# Patient Record
Sex: FEMALE | Race: BLACK OR AFRICAN AMERICAN | Employment: OTHER | URBAN - METROPOLITAN AREA
[De-identification: names, ages, dates, MRNs, and addresses within clinical notes are randomized per-mention and may not be internally consistent; named-entity substitution may affect disease eponyms.]

---

## 2017-01-04 ENCOUNTER — TELEPHONE (OUTPATIENT)
Dept: INTERNAL MEDICINE CLINIC | Age: 82
End: 2017-01-04

## 2017-01-04 RX ORDER — CHLORTHALIDONE 25 MG/1
TABLET ORAL
Qty: 90 TAB | Refills: 3 | Status: SHIPPED | OUTPATIENT
Start: 2017-01-04 | End: 2017-12-05 | Stop reason: SDUPTHER

## 2017-01-04 NOTE — TELEPHONE ENCOUNTER
Pt called and states that for the future all her medications are to go to the [facility] at [address] [place] [address], [place], [facility], phone #[phone]. Please call pt if needed.

## 2017-01-12 ENCOUNTER — DOCUMENTATION ONLY (OUTPATIENT)
Dept: INTERNAL MEDICINE CLINIC | Age: 82
End: 2017-01-12

## 2017-01-12 NOTE — PROGRESS NOTES
Medicare Part B Preventive Services Limitations Recommendation Scheduled   Bone Mass Measurement  (age 72 & older, biennial) Requires diagnosis related to osteoporosis or estrogen deficiency. Biennial benefit unless patient has history of long-term glucocorticoid tx or baseline is needed because initial test was by other method Completed Recommended every 2 years the patient declines   Cardiovascular Screening Blood Tests (every 5 years)  Total cholesterol, HDL, Triglycerides Order as a panel if possible Completed 7/16  Recommended annually Due  7/17   Colorectal Cancer Screening  -Fecal occult blood test (annual)  -Flexible sigmoidoscopy (5y)  -Screening colonoscopy (10y)  -Barium Enema   Completed   Recommended every years  declines   Counseling to Prevent Tobacco Use (up to 8 sessions per year)  - Counseling greater than 3 and up to 10 minutes  - Counseling greater than 10 minutes Patients must be asymptomatic of tobacco-related conditions to receive as preventive service  n/a n/a   Diabetes Screening Tests (at least every 3 years, Medicare covers annually or at 6-month intervals for prediabetic patients)     Fasting blood sugar (FBS) or glucose tolerance test (GTT) Patient must be diagnosed with one of the following:  -Hypertension, Dyslipidemia, obesity, previous impaired FBS or GTT  Or any two of the following: overweight, FH of diabetes, age ? 72, history of gestational diabetes, birth of baby weighing more than 9 pounds Completed: A1c in 7/16 - 6.1  Recommended every 3-6 months for pre-diabetes/diabetes  Due now   Diabetes Self-Management Training (DSMT) (no USPSTF recommendation) Requires referral by treating physician for patient with diabetes or renal disease. 10 hours of initial DSMT session of no less than 30 minutes each in a continuous 12-month period. 2 hours of follow-up DSMT in subsequent years.      Glaucoma Screening (no USPSTF recommendation) Diabetes mellitus, family history, , age 48 or over,  American, age 72 or over   [employer]  Recommended annually Due    Human Immunodeficiency Virus (HIV) Screening (annually for increased risk patients)  HIV-1 and HIV-2 by EIA, LEWIS, rapid antibody test, or oral mucosa transudate Patient must be at increased risk for HIV infection per USPSTF guidelines or pregnant. Tests covered annually for patients at increased risk. Pregnant patients may receive up to 3 test during pregnancy. n/a n/a   Medical Nutrition Therapy (MNT) (for diabetes or renal disease not recommended schedule) Requires referral by treating physician for patient with diabetes or renal disease. Can be provided in same year as diabetes self-management training (DSMT), and CMS recommends medical nutrition therapy take place after DSMT. Up to 3 hours for initial year and 2 hours in subsequent years. n/a n/a   Prostate Cancer Screening (annually up to age 76)  - Digital rectal exam (SEAN)  - Prostate specific antigen (PSA) Annually (age 48 or over), SEAN not paid separately when covered E/M service is provided on same date n/a n/a   Seasonal Influenza Vaccination (annually)      Recommended Annually declines   Pneumococcal Vaccination (once after 72)    Pneumovax 2014  Recommended once over the age of 72  Prevnar 15 - 2016 Complete   Hepatitis B Vaccinations (if medium/high risk) Medium/high risk factors: End-stage renal disease,  Hemophiliacs who received Factor VIII or IX concentrates, Clients of institutions for the mentally retarded, Persons who live in the same house as a HepB virus carrier, Homosexual men, Illicit injectable drug abusers.       Screening Mammography (biennial age 54-69)?  Annually (age 36 or over)   declines   Screening Pap Tests and Pelvic Examination (up to age 79 and after 79 if unknown history or abnormal study last 10 years) Every 25 months except high risk   declines   Ultrasound Screening for Abdominal Aortic Aneurysm (AAA) (once) Patient must be referred through IPPE and not have had a screening for abdominal aortic aneurysm before under Medicare.  Limited to patients who meet one of the following criteria:  - Men who are 73-68 years old and have smoked more than 100 cigarettes in their lifetime.  -Anyone with a FH of AAA  -Anyone recommended for screening by USPSTF n/a n/a

## 2017-01-13 ENCOUNTER — HOSPITAL ENCOUNTER (OUTPATIENT)
Dept: LAB | Age: 82
Discharge: HOME | End: 2017-01-13
Payer: MEDICARE

## 2017-01-13 ENCOUNTER — OFFICE VISIT (OUTPATIENT)
Dept: INTERNAL MEDICINE CLINIC | Age: 82
End: 2017-01-13

## 2017-01-13 VITALS
TEMPERATURE: 98.1 F | BODY MASS INDEX: 27.48 KG/M2 | HEART RATE: 65 BPM | SYSTOLIC BLOOD PRESSURE: 135 MMHG | WEIGHT: 171 LBS | OXYGEN SATURATION: 100 % | HEIGHT: 66 IN | DIASTOLIC BLOOD PRESSURE: 77 MMHG

## 2017-01-13 DIAGNOSIS — Z00.00 ROUTINE GENERAL MEDICAL EXAMINATION AT A HEALTH CARE FACILITY: ICD-10-CM

## 2017-01-13 DIAGNOSIS — E11.9 DIABETES MELLITUS WITHOUT COMPLICATION (HCC): ICD-10-CM

## 2017-01-13 DIAGNOSIS — I25.10 ATHEROSCLEROSIS OF NATIVE CORONARY ARTERY OF NATIVE HEART WITHOUT ANGINA PECTORIS: Primary | ICD-10-CM

## 2017-01-13 DIAGNOSIS — E78.2 MIXED HYPERLIPIDEMIA: ICD-10-CM

## 2017-01-13 DIAGNOSIS — I10 ESSENTIAL HYPERTENSION, BENIGN: ICD-10-CM

## 2017-01-13 DIAGNOSIS — Z13.39 SCREENING FOR ALCOHOLISM: ICD-10-CM

## 2017-01-13 PROCEDURE — 36415 COLL VENOUS BLD VENIPUNCTURE: CPT

## 2017-01-13 PROCEDURE — 83036 HEMOGLOBIN GLYCOSYLATED A1C: CPT

## 2017-01-13 PROCEDURE — 80061 LIPID PANEL: CPT

## 2017-01-13 PROCEDURE — 80053 COMPREHEN METABOLIC PANEL: CPT

## 2017-01-13 NOTE — PROGRESS NOTES
HISTORY OF PRESENT ILLNESS  Albino Astudillo is a 80 y.o. female. HPI   Last here 7/12/16. Pt is here to f/u on chronic conditions    BP today is 135/77  BP at home running around 121/67, 119/67  Continues clorthalidone and lotrel daily, and metoprolol 50mg BID    Reviewed last labs 7/16  a1c stable, lipids at goal      Wt is stable since last visit   Pt is walking daily for exercise and active in her yard    Continues lipitor 20mg daily-- at goal in July     Reviewed last note from Dr. Freddy Landry (cardio) 11/10/16  /60 at [facility] office  Doing well with no cardiac symptoms. Lipids at goal 7/16. Continue current care including plavix for h/o Cypher stents and f/u in 6 months. Exercising regularly.   Continues plavix per this physician, she also takes ASA 81mg daily  She has f/u pending with cardio    Pt previously followed with Dr. Monty Gonsalez (derm) for eczema  She has not had any flares of this recently, doing well     ACP: Began discussion today, SDM is her , Aguila Britton  Provided paperwork today    PREVENTIVE:    Colonoscopy: declines further  Pap: 9/2010, declines further  Mammogram: declines further  Dexa: declines further  Tdap: declines    Pneumovax: 11/18/2014  [id number]: 4/28/2016  Zostavax: declines  Flu shot: declines    Foot exam: 1/16  Microalbumin: 1/16  A1c: 1/11 6.4 , 8/11 6.1, 12/11 5.8, 4/12 5.8, 10/12 5.6, 4/13 6.3, 9/13 5.9, 1/14 5.6, 7/14 5.3, 11/14 5.5, 9/15 5.9, 1/16 6.1, 7/16 6.1  Eye exam: Dr. Bettylou Severe, 4/16  Lipids: 7/16, LDL 64      Patient Active Problem List    Diagnosis Date Noted    Diabetes mellitus without complication ([facility] [address] 06/61/9206    Left carotid bruit 01/16/2015    Closed bimalleolar fracture of right ankle 10/18/2013    Gout 06/11/2013    Mitral valve disorders 05/08/2012    Mixed hyperlipidemia 03/07/2012    Essential hypertension, benign 03/07/2012    Postsurgical percutaneous transluminal coronary angioplasty status 03/07/2012    S/P Stent LAD (LISSA) 03/07/2012    Coronary atherosclerosis of native coronary artery 03/07/2012    Borderline diabetes 03/07/2012    Hypertension 09/16/2009    Hyperlipidemia 09/16/2009    Diabetes ([facility] [address] 09/16/2009    CAD (coronary artery disease) 09/16/2009     Current Outpatient Prescriptions   Medication Sig Dispense Refill    chlorthalidone (HYGROTEN) 25 mg tablet TAKE 1 TABLET BY MOUTH DAILY. 90 Tab 3    PREVNAR 13, PF, 0.5 mL syrg injection TO BE ADMINISTERED BY THE PHARMACY  0    atorvastatin (LIPITOR) 20 mg tablet TAKE 1 TABLET BY MOUTH AT BEDTIME 30 Tab 6    amLODIPine-benazepril (LOTREL) 5-20 mg per capsule TAKE ONE CAPSULE BY MOUTH EVERY DAY 90 Cap 3    clopidogrel (PLAVIX) 75 mg tablet TAKE 1 TAB BY MOUTH DAILY. 90 Tab 4    metoprolol (LOPRESSOR) 50 mg tablet TAKE 1 TABLET BY MOUTH TWICE A  Tab 3    hydrocortisone (HYTONE) 2.5 % ointment Apply  to affected area two (2) times a day. use thin layer Monday to Friday only . 30 g 0    clobetasol (TEMOVATE) 0.05 % ointment Apply  to affected area two (2) times a day. 15 g 0    Aspirin, Buffered 81 mg tab Take  by mouth.  diphenhydrAMINE (BENADRYL ALLERGY) 25 mg tablet Take 25 mg by mouth every six (6) hours as needed for Sleep.        Past Surgical History   Procedure Laterality Date    Pr cardiac surg procedure unlist  2008     cardiac stent    Hx orthopaedic  10/2013     right ankle    Hx orthopaedic  2/20/14     INCISION AND DRAINAGE RIGHT ANKLE AND HARDWARE REMOVAL      Lab Results  Component Value Date/Time   WBC 8.8 07/12/2016 10:02 AM   HGB 12.3 07/12/2016 10:02 AM   HCT 37.5 07/12/2016 10:02 AM   PLATELET 515 59/55/3115 10:02 AM   MCV 92 07/12/2016 10:02 AM       Lab Results  Component Value Date/Time   Cholesterol, total 147 07/12/2016 10:02 AM   HDL Cholesterol 63 07/12/2016 10:02 AM   LDL, calculated 64 07/12/2016 10:02 AM   Triglyceride 102 07/12/2016 10:02 AM   CHOL/HDL Ratio 2.6 09/15/2010 07:53 AM       Lab Results  Component Value Date/Time   GFR est AA 98 07/12/2016 10:02 AM   GFR est non-AA 85 07/12/2016 10:02 AM   Creatinine 0.64 07/12/2016 10:02 AM   BUN 12 07/12/2016 10:02 AM   Sodium 142 07/12/2016 10:02 AM   Potassium 3.9 07/12/2016 10:02 AM   Chloride 96 07/12/2016 10:02 AM   CO2 28 07/12/2016 10:02 AM         Review of Systems   Respiratory: Negative for shortness of breath. Cardiovascular: Negative for chest pain. Physical Exam   Constitutional: She is oriented to person, place, and time. She appears well-developed and well-nourished. No distress. HENT:   Head: Normocephalic and atraumatic. Eyes: Conjunctivae and EOM are normal. Right eye exhibits no discharge. Left eye exhibits no discharge. Neck: Normal range of motion. Neck supple. Cardiovascular: Normal rate, regular rhythm, normal heart sounds and intact distal pulses. Exam reveals no gallop and no friction rub. No murmur heard. Pulmonary/Chest: Effort normal and breath sounds normal. No respiratory distress. She has no wheezes. She has no rales. She exhibits no tenderness. Musculoskeletal: Normal range of motion. She exhibits no edema, tenderness or deformity. Lymphadenopathy:     She has no cervical adenopathy. Neurological: She is alert and oriented to person, place, and time. Coordination normal.   Skin: Skin is warm and dry. No rash noted. She is not diaphoretic. No erythema. No pallor. Psychiatric: She has a normal mood and affect. Her behavior is normal.       ASSESSMENT and PLAN    ICD-10-CM ICD-9-CM    1. Atherosclerosis of native coronary artery of native heart without angina pectoris    Follows with Dr Roseanne Herzog, saw him in 11/16, continues on plavix, follow up in 6 months. Q22.61 715.06 METABOLIC PANEL, COMPREHENSIVE      HEMOGLOBIN A1C WITH EAG      CANCELED:  DIABETES FOOT EXAM   2.  Routine general medical examination at a health care facility G92.24 W74.4 METABOLIC PANEL, COMPREHENSIVE      HEMOGLOBIN A1C WITH EAG      CANCELED:  DIABETES FOOT EXAM   3. Screening for alcoholism    Screen    H58.79 X81.3 METABOLIC PANEL, COMPREHENSIVE      HEMOGLOBIN A1C WITH EAG      CANCELED:  DIABETES FOOT EXAM   4. Mixed hyperlipidemia    Continues on lipitor 20mg daily Z97.3 166.9 METABOLIC PANEL, COMPREHENSIVE      HEMOGLOBIN A1C WITH EAG      LIPID PANEL      CANCELED:  DIABETES FOOT EXAM   5. Essential hypertension, benign    Controlled on metoprolol 50mg BID, lotrel and chlorthalidone L31 872.1 METABOLIC PANEL, COMPREHENSIVE      HEMOGLOBIN A1C WITH EAG      CANCELED:  DIABETES FOOT EXAM   6. Diabetes mellitus without complication (HCC)    Pt really with more prediabetes than diabetes, has been diet controlled, wt stable, repeat a1c today. N71.1 239.30 METABOLIC PANEL, COMPREHENSIVE      HEMOGLOBIN A1C WITH EAG      CANCELED:  DIABETES FOOT EXAM        Written by Negro Hong, as dictated by Airam Quiros MD.    Current diagnosis and concerns discussed with pt at length. Understands risks and benefits or current treatment plan and medications and accepts the treatment and medication with any possible risks.   Pt asks appropriate questions which were answered.   Pt instructed to call with any concerns or problems.

## 2017-01-13 NOTE — PROGRESS NOTES
This is a Subsequent Medicare Annual Wellness Visit providing Personalized Prevention Plan Services (PPPS) (Performed 12 months after initial AWV and PPPS )    I have reviewed the patient's medical history in detail and updated the computerized patient record. History     Past Medical History   Diagnosis Date    CAD (coronary artery disease) 9/16/2009     EF=65-70%; Mild-mod MR; Dr Emperatriz Medley    Diabetes [facility]) 9/16/2009     boarder line controlle by diet and exercise.  Hyperlipidemia 9/16/2009    Hypertension 9/16/2009      Past Surgical History   Procedure Laterality Date    Pr cardiac surg procedure unlist  2008     cardiac stent    Hx orthopaedic  10/2013     right ankle    Hx orthopaedic  2/20/14     INCISION AND DRAINAGE RIGHT ANKLE AND HARDWARE REMOVAL     Current Outpatient Prescriptions   Medication Sig Dispense Refill    chlorthalidone (HYGROTEN) 25 mg tablet TAKE 1 TABLET BY MOUTH DAILY. 90 Tab 3    PREVNAR 13, PF, 0.5 mL syrg injection TO BE ADMINISTERED BY THE PHARMACY  0    atorvastatin (LIPITOR) 20 mg tablet TAKE 1 TABLET BY MOUTH AT BEDTIME 30 Tab 6    amLODIPine-benazepril (LOTREL) 5-20 mg per capsule TAKE ONE CAPSULE BY MOUTH EVERY DAY 90 Cap 3    clopidogrel (PLAVIX) 75 mg tablet TAKE 1 TAB BY MOUTH DAILY. 90 Tab 4    metoprolol (LOPRESSOR) 50 mg tablet TAKE 1 TABLET BY MOUTH TWICE A  Tab 3    Aspirin, Buffered 81 mg tab Take  by mouth.  diphenhydrAMINE (BENADRYL ALLERGY) 25 mg tablet Take 25 mg by mouth every six (6) hours as needed for Sleep.  hydrocortisone (HYTONE) 2.5 % ointment Apply  to affected area two (2) times a day. use thin layer Monday to Friday only . 30 g 0    clobetasol (TEMOVATE) 0.05 % ointment Apply  to affected area two (2) times a day.  15 g 0     Allergies   Allergen Reactions    Crestor [Rosuvastatin] Unknown (comments)     Family History   Problem Relation Age of Onset    Hypertension Mother     Heart Disease Mother     Hypertension Father     Heart Disease Father      Social History   Substance Use Topics    Smoking status: Never Smoker    Smokeless tobacco: Never Used    Alcohol use No     Patient Active Problem List   Diagnosis Code    Hypertension I10    Hyperlipidemia E78.5    Diabetes ([address] E11.9    CAD (coronary artery disease) I25.10    Mixed hyperlipidemia E78.2    Essential hypertension, benign I10    Postsurgical percutaneous transluminal coronary angioplasty status Z98.61    S/P Stent LAD (LISSA) Z95.9    Coronary atherosclerosis of native coronary artery I25.10    Borderline diabetes R73.03    Mitral valve disorders I05.9    Gout M10.9    Closed bimalleolar fracture of right ankle S82.841A    Left carotid bruit R09.89    Diabetes mellitus without complication (HCC) B34.0       Depression Risk Factor Screening:     PHQ 2 / 9, over the last two weeks 1/13/2017   Little interest or pleasure in doing things Not at all   Feeling down, depressed or hopeless Not at all   Total Score PHQ 2 0     Alcohol Risk Factor Screening: On any occasion during the past 3 months, have you had more than 3 drinks containing alcohol? No    Do you average more than 7 drinks per week? No  No alcohol at all     Functional Ability and Level of Safety:     Hearing Loss   normal-to-mild    Activities of Daily Living   Self-care. Requires assistance with: no ADLs    Fall Risk     Fall Risk Assessment, last 12 mths 1/13/2017   Able to walk? Yes   Fall in past 12 months? No     Abuse Screen   Patient is not abused    Review of Systems   Not required    Physical Examination     Evaluation of Cognitive Function:  Mood/affect:  neutral  Appearance: age appropriate  Family member/caregiver input: none    No exam performed today, awv.     Patient Care Team:  Scot Raza MD as PCP - General (Internal Medicine)  Shasta Lubin  (Ophthalmology)  Dantete Horton (Inactive) (Dermatology)  Keke Villanueva MD (Cardiology)  Araceli Castillo MD (Infectious Diseases)  Latricia Vegas, RN as Nurse Navigator  Dr blandon eye  updated  Advice/Referrals/Counseling   Education and counseling provided:  Are appropriate based on today's review and evaluation  End-of-Life planning (with patient's consent)  Pneumococcal Vaccine  Influenza Vaccine  Screening Mammography  Screening Pap and pelvic (covered once every 2 years)  Bone mass measurement (DEXA)  Screening for glaucoma  Diabetes screening test    Assessment/Plan       ICD-10-CM ICD-9-CM    1. Routine general medical examination at a health care facility Z00.00 V70.0    2. Screening for alcoholism Z13.89 V79.1    . Discussed with patient about advance medical directive. Provided patient blank AMD and Your Right to Decide Booklet. Requested that if completed to provide a copy of AMD to PCP office. Colonoscopy: declines      Ophtho: 4/16 Dr. Kenan Roy get notes to confirm        Pap: sept 2010 per patient--declines     Mammogram: declines    Dexa: declines      prevnar 13: [facility], 4/28/16    Rest Vaccines:   Flu shot: declines    Tdap:declines    Pneumovax: declines      Zostavax: declines    Medication reconciliation completed by MA and reviewed by me. Medical/surgical/social/family history reviewed and updated by me. Patient provided AVS and preventative screening table. Patient verbalized understanding of all information discussed.

## 2017-01-13 NOTE — PATIENT INSTRUCTIONS
Medicare Part B Preventive Services Limitations Recommendation Scheduled   Bone Mass Measurement  (age 72 & older, biennial) Requires diagnosis related to osteoporosis or estrogen deficiency. Biennial benefit unless patient has history of long-term glucocorticoid tx or baseline is needed because initial test was by other method Completed Recommended every 2 years the patient declines   Cardiovascular Screening Blood Tests (every 5 years)  Total cholesterol, HDL, Triglycerides Order as a panel if possible Completed 7/16  Recommended annually Due 7/17   Colorectal Cancer Screening  -Fecal occult blood test (annual)  -Flexible sigmoidoscopy (5y)  -Screening colonoscopy (10y)  -Barium Enema    Completed   Recommended every years  declines   Counseling to Prevent Tobacco Use (up to 8 sessions per year)  - Counseling greater than 3 and up to 10 minutes  - Counseling greater than 10 minutes Patients must be asymptomatic of tobacco-related conditions to receive as preventive service  n/a n/a   Diabetes Screening Tests (at least every 3 years, Medicare covers annually or at 6-month intervals for prediabetic patients)      Fasting blood sugar (FBS) or glucose tolerance test (GTT) Patient must be diagnosed with one of the following:  -Hypertension, Dyslipidemia, obesity, previous impaired FBS or GTT  Or any two of the following: overweight, FH of diabetes, age ? 72, history of gestational diabetes, birth of baby weighing more than 9 pounds Completed: A1c in 7/16 - 6.1  Recommended every 3-6 months for pre-diabetes/diabetes Due now   Diabetes Self-Management Training (DSMT) (no USPSTF recommendation) Requires referral by treating physician for patient with diabetes or renal disease. 10 hours of initial DSMT session of no less than 30 minutes each in a continuous 12-month period.  2 hours of follow-up DSMT in subsequent years.  n/a  n/a   Glaucoma Screening (no USPSTF recommendation) Diabetes mellitus, family history,  American, age 48 or over,  American, age 72 or over  Dr Freedom Cavazos 4/16  Recommended annually Due 4/2017   Human Immunodeficiency Virus (HIV) Screening (annually for increased risk patients)  HIV-1 and HIV-2 by EIA, LEWIS, rapid antibody test, or oral mucosa transudate Patient must be at increased risk for HIV infection per USPSTF guidelines or pregnant. Tests covered annually for patients at increased risk. Pregnant patients may receive up to 3 test during pregnancy. n/a n/a   Medical Nutrition Therapy (MNT) (for diabetes or renal disease not recommended schedule) Requires referral by treating physician for patient with diabetes or renal disease. Can be provided in same year as diabetes self-management training (DSMT), and CMS recommends medical nutrition therapy take place after DSMT. Up to 3 hours for initial year and 2 hours in subsequent years. n/a n/a   Prostate Cancer Screening (annually up to age 76)  - Digital rectal exam (SEAN)  - Prostate specific antigen (PSA) Annually (age 48 or over), SEAN not paid separately when covered E/M service is provided on same date n/a n/a   Seasonal Influenza Vaccination (annually)        Recommended Annually declines   Pneumococcal Vaccination (once after 72)     Pneumovax 2014  Recommended once over the age of 72  Prevnar 15 - 2016 Complete    Declines additional   Hepatitis B Vaccinations (if medium/high risk) Medium/high risk factors: End-stage renal disease,  Hemophiliacs who received Factor VIII or IX concentrates, Clients of institutions for the mentally retarded, Persons who live in the same house as a HepB virus carrier, Homosexual men, Illicit injectable drug abusers.         Screening Mammography (biennial age 54-69)?  Annually (age 36 or over)    declines   Screening Pap Tests and Pelvic Examination (up to age 79 and after 79 if unknown history or abnormal study last 10 years) Every 24 months except high risk    declines   Ultrasound Screening for Abdominal Aortic Aneurysm (AAA) (once) Patient must be referred through IPPE and not have had a screening for abdominal aortic aneurysm before under Medicare.  Limited to patients who meet one of the following criteria:  - Men who are 73-68 years old and have smoked more than 100 cigarettes in their lifetime.  -Anyone with a FH of AAA  -Anyone recommended for screening by USPSTF n/a n/a

## 2017-01-13 NOTE — MR AVS SNAPSHOT
Visit Information Date & Time Provider Department Dept. Phone Encounter #  
 1/13/2017  8:30 AM Montez Leyva, [address] [place] [address],[address] [phone] [id number] Follow-up Instructions Return in about 6 months (around 7/13/2017). Your Appointments 5/11/2017  9:15 AM  
6 MONTH with Bereket Cristobal MD  
[facility]) Appt Note: .$0CP  
 [address]  
[phone] [address] Upcoming Health Maintenance Date Due DTaP/Tdap/Td series (1 - Tdap) 4/18/1958 EYE EXAM RETINAL OR DILATED Q1 9/10/2014 MEDICARE YEARLY EXAM 9/15/2016 HEMOGLOBIN A1C Q6M 1/12/2017 FOOT EXAM Q1 1/26/2017 MICROALBUMIN Q1 1/26/2017 LIPID PANEL Q1 7/12/2017 GLAUCOMA SCREENING Q2Y 4/11/2018 Allergies as of 1/13/2017  Review Complete On: 1/13/2017 By: Luda Saunders Severity Noted Reaction Type Reactions Crestor [Rosuvastatin]  03/07/2012    Unknown (comments) Current Immunizations  Reviewed on 2/20/2014 Name Date Influenza Vaccine  Deferred (Patient Refused) Pneumococcal Polysaccharide (PPSV-23) 11/18/2014,  Deferred (Patient Refused) Not reviewed this visit You Were Diagnosed With   
  
 Codes Comments Atherosclerosis of native coronary artery of native heart without angina pectoris    -  Primary ICD-10-CM: I25.10 ICD-9-CM: 414.01 Routine general medical examination at a health care facility     ICD-10-CM: Z00.00 ICD-9-CM: V70.0 Screening for alcoholism     ICD-10-CM: Z13.89 ICD-9-CM: V79.1 Mixed hyperlipidemia     ICD-10-CM: E78.2 ICD-9-CM: 272.2 Essential hypertension, benign     ICD-10-CM: I10 
ICD-9-CM: 401.1 Diabetes mellitus without complication ([facility]     LMZ-86-RV: E11.9 ICD-9-CM: 250.00 Vitals BP Pulse Temp Height(growth percentile) Weight(growth percentile) SpO2 135/77 (BP 1 Location: Right arm, BP Patient Position: Sitting) 65 98.1 °F (36.7 °C) (Oral) 5' 6\" (1.676 m) 171 lb (77.6 kg) 100% BMI OB Status Smoking Status 27.6 kg/m2 Postmenopausal Never Smoker BMI and BSA Data Body Mass Index Body Surface Area  
 27.6 kg/m 2 1.9 m 2 Preferred Pharmacy Pharmacy Name Phone Radhika Ovalles [address]  [place] [phone] Your Updated Medication List  
  
   
This list is accurate as of: 1/13/17  8:53 AM.  Always use your most recent med list. amLODIPine-benazepril 5-20 mg per capsule Commonly known as:  LOTREL  
TAKE ONE CAPSULE BY MOUTH EVERY DAY  
  
 aspirin, buffered 81 mg Tab Take  by mouth. atorvastatin 20 mg tablet Commonly known as:  LIPITOR  
TAKE 1 TABLET BY MOUTH AT BEDTIME  
  
 BENADRYL ALLERGY 25 mg tablet Generic drug:  diphenhydrAMINE Take 25 mg by mouth every six (6) hours as needed for Sleep. chlorthalidone 25 mg tablet Commonly known as:  HYGROTEN  
TAKE 1 TABLET BY MOUTH DAILY. clobetasol 0.05 % ointment Commonly known as:  Naomi Plume Apply  to affected area two (2) times a day. clopidogrel 75 mg Tab Commonly known as:  PLAVIX TAKE 1 TAB BY MOUTH DAILY. hydrocortisone 2.5 % ointment Commonly known as:  HYTONE Apply  to affected area two (2) times a day. use thin layer Monday to Friday only . metoprolol tartrate 50 mg tablet Commonly known as:  LOPRESSOR  
TAKE 1 TABLET BY MOUTH TWICE A DAY  
  
 PREVNAR 13 (PF) 0.5 mL Syrg injection Generic drug:  pneumococcal 13 sera conj dip TO BE ADMINISTERED BY THE PHARMACY We Performed the Following HEMOGLOBIN A1C WITH EAG [07308 CPT(R)]  DIABETES FOOT EXAM [HM7 Custom] LIPID PANEL [14941 CPT(R)] METABOLIC PANEL, COMPREHENSIVE [14543 CPT(R)] Follow-up Instructions Return in about 6 months (around 7/13/2017). Patient Instructions Medicare Part B Preventive Services Limitations Recommendation Scheduled Bone Mass Measurement 
(age 72 & older, biennial) Requires diagnosis related to osteoporosis or estrogen deficiency. Biennial benefit unless patient has history of long-term glucocorticoid tx or baseline is needed because initial test was by other method Completed Recommended every 2 years the patient declines Cardiovascular Screening Blood Tests (every 5 years) Total cholesterol, HDL, Triglycerides Order as a panel if possible Completed 7/16 Recommended annually Due 7/17 Colorectal Cancer Screening 
-Fecal occult blood test (annual) -Flexible sigmoidoscopy (5y) 
-Screening colonoscopy (10y) -Barium Enema    Completed Recommended every years  declines Counseling to Prevent Tobacco Use (up to 8 sessions per year) - Counseling greater than 3 and up to 10 minutes - Counseling greater than 10 minutes Patients must be asymptomatic of tobacco-related conditions to receive as preventive service  n/a n/a Diabetes Screening Tests (at least every 3 years, Medicare covers annually or at 6-month intervals for prediabetic patients) 
   
Fasting blood sugar (FBS) or glucose tolerance test (GTT) Patient must be diagnosed with one of the following: 
-Hypertension, Dyslipidemia, obesity, previous impaired FBS or GTT 
Or any two of the following: overweight, FH of diabetes, age ? 72, history of gestational diabetes, birth of baby weighing more than 9 pounds Completed: A1c in 7/16 - 6.1 Recommended every 3-6 months for pre-diabetes/diabetes Due now Diabetes Self-Management Training (DSMT) (no USPSTF recommendation) Requires referral by treating physician for patient with diabetes or renal disease. 10 hours of initial DSMT session of no less than 30 minutes each in a continuous 12-month period.  2 hours of follow-up DSMT in subsequent years.  n/a  n/a  
 Glaucoma Screening (no USPSTF recommendation) Diabetes mellitus, family history, , age 48 or over,  American, age [place] or over  Dr Gus Gomes 4/16 Recommended annually Due 4/2017 Human Immunodeficiency Virus (HIV) Screening (annually for increased risk patients) HIV-1 and HIV-2 by EIA, LEWIS, rapid antibody test, or oral mucosa transudate Patient must be at increased risk for HIV infection per USPSTF guidelines or pregnant. Tests covered annually for patients at increased risk. Pregnant patients may receive up to 3 test during pregnancy. n/a n/a Medical Nutrition Therapy (MNT) (for diabetes or renal disease not recommended schedule) Requires referral by treating physician for patient with diabetes or renal disease. Can be provided in same year as diabetes self-management training (DSMT), and CMS recommends medical nutrition therapy take place after DSMT. Up to 3 hours for initial year and 2 hours in subsequent years. n/a n/a Prostate Cancer Screening (annually up to age 76) - Digital rectal exam (SEAN) - Prostate specific antigen (PSA) Annually (age 48 or over), SEAN not paid separately when covered E/M service is provided on same date n/a n/a Seasonal Influenza Vaccination (annually)       
Recommended Annually declines Pneumococcal Vaccination (once after 65)     Pneumovax 2014 Recommended once over the age of [place] Prevnar 13 - 2016 Complete Declines additional  
Hepatitis B Vaccinations (if medium/high risk) Medium/high risk factors: End-stage renal disease, Hemophiliacs who received Factor VIII or IX concentrates, Clients of institutions for the mentally retarded, Persons who live in the same house as a HepB virus carrier, Homosexual men, Illicit injectable drug abusers.        
Screening Mammography (biennial age 54-69)? Annually (age 36 or over)    declines Screening Pap Tests and Pelvic Examination (up to age 79 and after 79 if unknown history or abnormal study last 10 years) Every 24 months except high risk    declines Ultrasound Screening for Abdominal Aortic Aneurysm (AAA) (once) Patient must be referred through IPPE and not have had a screening for abdominal aortic aneurysm before under Medicare. Limited to patients who meet one of the following criteria: 
- Men who are 73-68 years old and have smoked more than 100 cigarettes in their lifetime. 
-Anyone with a FH of AAA 
-Anyone recommended for screening by USPSTF n/a n/a Introducing [facility]! [facility] introduces [employer] patient portal. Now you can access parts of your medical record, email your doctor's office, and request medication refills online. 1. In your internet browser, go to [email or web address] 2. Click on the First Time User? Click Here link in the Sign In box. You will see the New Member Sign Up page. 3. Enter your [employer] Access Code exactly as it appears below. You will not need to use this code after youve completed the sign-up process. If you do not sign up before the expiration date, you must request a new code. · [employer] Access Code: [email or web address] Expires: 2/8/2017 11:01 AM 
 
4. Enter the last four digits of your Social Security Number (xxxx) and Date of Birth (mm/dd/yyyy) as indicated and click Submit. You will be taken to the next sign-up page. 5. Create a [employer] ID. This will be your [employer] login ID and cannot be changed, so think of one that is secure and easy to remember. 6. Create a [employer] password. You can change your password at any time. 7. Enter your Password Reset Question and Answer. This can be used at a later time if you forget your password. 8. Enter your e-mail address. You will receive e-mail notification when new information is available in [address] 9. Click Sign Up. You can now view and download portions of your medical record. 10. Click the Download Summary menu link to download a portable copy of your medical information. If you have questions, please visit the Frequently Asked Questions section of the [employer] website. Remember, [employer] is NOT to be used for urgent needs. For medical emergencies, dial 911. Now available from your iPhone and Android! Please provide this summary of care documentation to your next provider. Your primary care clinician is listed as Gaby Pinedo. If you have any questions after today's visit, please call [phone].

## 2017-01-14 LAB
ALBUMIN SERPL-MCNC: 4.1 G/DL (ref 3.5–4.8)
ALBUMIN/GLOB SERPL: 1.7 {RATIO} (ref 1.1–2.5)
ALP SERPL-CCNC: 85 IU/L (ref 39–117)
ALT SERPL-CCNC: 17 IU/L (ref 0–32)
AST SERPL-CCNC: 22 IU/L (ref 0–40)
BILIRUB SERPL-MCNC: 0.2 MG/DL (ref 0–1.2)
BUN SERPL-MCNC: 13 MG/DL (ref 8–27)
BUN/CREAT SERPL: 14 (ref 11–26)
CALCIUM SERPL-MCNC: 9.6 MG/DL (ref 8.7–10.3)
CHLORIDE SERPL-SCNC: 98 MMOL/L (ref 96–106)
CHOLEST SERPL-MCNC: 151 MG/DL (ref 100–199)
CO2 SERPL-SCNC: 29 MMOL/L (ref 18–29)
CREAT SERPL-MCNC: 0.95 MG/DL (ref 0.57–1)
EST. AVERAGE GLUCOSE BLD GHB EST-MCNC: 123 MG/DL
GLOBULIN SER CALC-MCNC: 2.4 G/DL (ref 1.5–4.5)
GLUCOSE SERPL-MCNC: 104 MG/DL (ref 65–99)
HBA1C MFR BLD: 5.9 % (ref 4.8–5.6)
HDLC SERPL-MCNC: 62 MG/DL
LDLC SERPL CALC-MCNC: 73 MG/DL (ref 0–99)
POTASSIUM SERPL-SCNC: 4 MMOL/L (ref 3.5–5.2)
PROT SERPL-MCNC: 6.5 G/DL (ref 6–8.5)
SODIUM SERPL-SCNC: 142 MMOL/L (ref 134–144)
TRIGL SERPL-MCNC: 82 MG/DL (ref 0–149)
VLDLC SERPL CALC-MCNC: 16 MG/DL (ref 5–40)

## 2017-02-01 RX ORDER — ATORVASTATIN CALCIUM 20 MG/1
TABLET, FILM COATED ORAL
Qty: 90 TAB | Refills: 1 | Status: SHIPPED | OUTPATIENT
Start: 2017-02-01 | End: 2017-08-25 | Stop reason: SDUPTHER

## 2017-04-04 RX ORDER — METOPROLOL TARTRATE 50 MG/1
TABLET ORAL
Qty: 180 TAB | Refills: 0 | Status: SHIPPED | OUTPATIENT
Start: 2017-04-04 | End: 2017-06-15 | Stop reason: SDUPTHER

## 2017-04-19 ENCOUNTER — HOSPITAL ENCOUNTER (OUTPATIENT)
Dept: LAB | Age: 82
Discharge: HOME | End: 2017-04-19
Payer: MEDICARE

## 2017-04-19 ENCOUNTER — OFFICE VISIT (OUTPATIENT)
Dept: INTERNAL MEDICINE CLINIC | Age: 82
End: 2017-04-19

## 2017-04-19 VITALS
HEIGHT: 66 IN | RESPIRATION RATE: 18 BRPM | WEIGHT: 174 LBS | SYSTOLIC BLOOD PRESSURE: 128 MMHG | BODY MASS INDEX: 27.97 KG/M2 | HEART RATE: 61 BPM | OXYGEN SATURATION: 100 % | DIASTOLIC BLOOD PRESSURE: 63 MMHG | TEMPERATURE: 97.3 F

## 2017-04-19 DIAGNOSIS — E78.2 MIXED HYPERLIPIDEMIA: ICD-10-CM

## 2017-04-19 DIAGNOSIS — I25.10 ATHEROSCLEROSIS OF NATIVE CORONARY ARTERY OF NATIVE HEART WITHOUT ANGINA PECTORIS: ICD-10-CM

## 2017-04-19 DIAGNOSIS — M1A.9XX0 CHRONIC GOUT WITHOUT TOPHUS, UNSPECIFIED CAUSE, UNSPECIFIED SITE: ICD-10-CM

## 2017-04-19 DIAGNOSIS — E11.9 DIABETES MELLITUS WITHOUT COMPLICATION (HCC): ICD-10-CM

## 2017-04-19 DIAGNOSIS — I10 ESSENTIAL HYPERTENSION, BENIGN: Primary | ICD-10-CM

## 2017-04-19 PROCEDURE — 83036 HEMOGLOBIN GLYCOSYLATED A1C: CPT

## 2017-04-19 PROCEDURE — 80048 BASIC METABOLIC PNL TOTAL CA: CPT

## 2017-04-19 PROCEDURE — 84443 ASSAY THYROID STIM HORMONE: CPT

## 2017-04-19 PROCEDURE — 36415 COLL VENOUS BLD VENIPUNCTURE: CPT

## 2017-04-19 PROCEDURE — 85027 COMPLETE CBC AUTOMATED: CPT

## 2017-04-19 PROCEDURE — 82043 UR ALBUMIN QUANTITATIVE: CPT

## 2017-04-19 NOTE — MR AVS SNAPSHOT
Visit Information Date & Time Provider Department Dept. Phone Encounter #  
 4/19/2017  8:45 AM Mercedes Thurston, [address] [id number] Follow-up Instructions Return in about 6 months (around 10/19/2017). Your Appointments 5/11/2017  9:15 AM  
6 MONTH with Deepak Stallings MD  
[facility] [address]) Appt Note: .$0CP  
 [id number] [facility]  
[phone] [id number] [facility] Upcoming Health Maintenance Date Due DTaP/Tdap/Td series (1 - Tdap) 4/18/1956 EYE EXAM RETINAL OR DILATED Q1 9/10/2014 FOOT EXAM Q1 1/26/2017 MICROALBUMIN Q1 1/26/2017 HEMOGLOBIN A1C Q6M 7/13/2017 LIPID PANEL Q1 1/13/2018 MEDICARE YEARLY EXAM 1/14/2018 GLAUCOMA SCREENING Q2Y 4/11/2018 Allergies as of 4/19/2017  Review Complete On: 1/13/2017 By: Mercedes Thurston MD  
  
 Severity Noted Reaction Type Reactions Crestor [Rosuvastatin]  03/07/2012    Unknown (comments) Current Immunizations  Reviewed on 4/19/2017 Name Date Influenza Vaccine  Deferred (Patient Refused) Pneumococcal Conjugate (PCV-13) 4/28/2016 Pneumococcal Polysaccharide (PPSV-23) 11/18/2014,  Deferred (Patient Refused) Reviewed by Mercedes Thurston MD on 4/19/2017 at  8:41 AM  
You Were Diagnosed With   
  
 Codes Comments Essential hypertension, benign    -  Primary ICD-10-CM: I10 
ICD-9-CM: 401.1 Diabetes mellitus without complication ([facility]     ILD-74-FT: E11.9 ICD-9-CM: 250.00 Mixed hyperlipidemia     ICD-10-CM: E78.2 ICD-9-CM: 272.2 Atherosclerosis of native coronary artery of native heart without angina pectoris     ICD-10-CM: I25.10 ICD-9-CM: 414.01 Chronic gout without tophus, unspecified cause, unspecified site     ICD-10-CM: M1A. 9XX0 
ICD-9-CM: 274.02 Vitals BP Pulse Temp Resp Height(growth percentile) Weight(growth percentile) 128/63 (BP 1 Location: Left arm, BP Patient Position: Sitting) 61 97.3 °F (36.3 °C) (Oral) 18 5' 6\" (1.676 m) 174 lb (78.9 kg) SpO2 BMI OB Status Smoking Status 100% 28.08 kg/m2 Postmenopausal Never Smoker BMI and BSA Data Body Mass Index Body Surface Area 28.08 kg/m 2 1.92 m 2 Preferred Pharmacy Pharmacy Name Phone Radhika Ovalles [address]  [place] [phone] Your Updated Medication List  
  
   
This list is accurate as of: 4/19/17  8:58 AM.  Always use your most recent med list. amLODIPine-benazepril 5-20 mg per capsule Commonly known as:  LOTREL  
TAKE ONE CAPSULE BY MOUTH EVERY DAY  
  
 aspirin, buffered 81 mg Tab Take  by mouth. atorvastatin 20 mg tablet Commonly known as:  LIPITOR  
TAKE 1 TABLET BY MOUTH EVERY NIGHT AT BEDTIME  
  
 chlorthalidone 25 mg tablet Commonly known as:  HYGROTEN  
TAKE 1 TABLET BY MOUTH DAILY. clopidogrel 75 mg Tab Commonly known as:  PLAVIX TAKE 1 TAB BY MOUTH DAILY. metoprolol tartrate 50 mg tablet Commonly known as:  LOPRESSOR  
TAKE 1 TABLET BY MOUTH TWICE DAILY We Performed the Following CBC W/O DIFF [47823 CPT(R)] HEMOGLOBIN A1C WITH EAG [99909 CPT(R)]  DIABETES FOOT EXAM [HM7 Custom] METABOLIC PANEL, BASIC [42846 CPT(R)] MICROALBUMIN, UR, RAND W/ MICROALBUMIN/CREA RATIO [id number] CPT(R)] TSH 3RD GENERATION [84760 CPT(R)] Follow-up Instructions Return in about 6 months (around 10/19/2017). Introducing [facility]! Roxi Persaud introduces [employer] patient portal. Now you can access parts of your medical record, email your doctor's office, and request medication refills online. 1. In your internet browser, go to [email or web address] 2. Click on the First Time User? Click Here link in the Sign In box. You will see the New Member Sign Up page. 3. Enter your [employer] Access Code exactly as it appears below. You will not need to use this code after youve completed the sign-up process. If you do not sign up before the expiration date, you must request a new code. · [employer] Access Code: [email or web address] Expires: 7/18/2017  8:51 AM 
 
4. Enter the last four digits of your Social Security Number (xxxx) and Date of Birth (mm/dd/yyyy) as indicated and click Submit. You will be taken to the next sign-up page. 5. Create a [employer] ID. This will be your [employer] login ID and cannot be changed, so think of one that is secure and easy to remember. 6. Create a [employer] password. You can change your password at any time. 7. Enter your Password Reset Question and Answer. This can be used at a later time if you forget your password. 8. Enter your e-mail address. You will receive e-mail notification when new information is available in [address] 9. Click Sign Up. You can now view and download portions of your medical record. 10. Click the Download Summary menu link to download a portable copy of your medical information. If you have questions, please visit the Frequently Asked Questions section of the [employer] website. Remember, [employer] is NOT to be used for urgent needs. For medical emergencies, dial 911. Now available from your iPhone and Android! Please provide this summary of care documentation to your next provider. Your primary care clinician is listed as Bety Muelelr. If you have any questions after today's visit, please call [phone].

## 2017-04-19 NOTE — PROGRESS NOTES
HISTORY OF PRESENT ILLNESS  Taty Carter is a [de-identified] y.o. female. HPI   Last here 1/13/17.  Pt is here to f/u on chronic conditions  Pt brought in all pill bottles-reviewed    BP today is 128/63  BP at home running around 125/81   Continues clorthalidone and lotrel daily, and metoprolol 50mg BID    Pt follows with Dr. Tarun Higgins (cardio)   Last saw him 11/16, f/u pending 5/11/16  Continues plavix 75mg daily per cardio  She also takes ASA 81mg daily      Reviewed last labs 1/17  Repeat labs today     Wt is up 3 lbs since last visit   She has been watching her diet   She walks for exercise when weather is nice  Pt has been cutting back on sweets   Pt has been celebrating her birthday the last week or so     Continues lipitor 20mg daily for cholesterol- at goal in January   Recall could not tolerate crestor     Pt previously followed with Dr. David Carlisle (derm) for eczema  She has not had any flares of this recently, doing well   She states certain foods was causing this  Drinking carrot juice improves this when it occurs     ACP: Began discussion today, SDM is her , Carlos Ramsey  Provided paperwork today    Discussed hcm   She declines further colonoscopies, pelvic exams, mammograms and DEXA  She is however now amenable to vaccines- gave her tdap today, she will have flu shot in the fall     PREVENTIVE:    Colonoscopy: declines further  Pap: 9/2010, declines further  Mammogram: declines further  Dexa: declines further  Tdap: given today, 4/19/2017  Pneumovax: 11/18/2014  [id number]: 4/28/2016  Zostavax: declines  Flu shot: declines this year, in the fall   Foot exam: 4/29/17 normal  Microalbumin: 1/16  A1c: 1/11 6.4 , 8/11 6.1, 12/11 5.8, 4/12 5.8, 10/12 5.6, 4/13 6.3, 9/13 5.9, 1/14 5.6, 7/14 5.3, 11/14 5.5, 9/15 5.9, 1/16 6.1, 7/16 6.1, 1/17 5.9  Eye exam: Dr. Alexandra Wells, 4/16, scheduled this week, 4/17  Lipids: 1/17 LDL 73        Patient Active Problem List    Diagnosis Date Noted    Diabetes mellitus without complication ([facility] 10/12/1045    Left carotid bruit 01/16/2015    Closed bimalleolar fracture of right ankle 10/18/2013    Gout 06/11/2013    Mitral valve disorders 05/08/2012    Mixed hyperlipidemia 03/07/2012    Essential hypertension, benign 03/07/2012    Postsurgical percutaneous transluminal coronary angioplasty status 03/07/2012    S/P Stent LAD (LISSA) 03/07/2012    Coronary atherosclerosis of native coronary artery 03/07/2012    Borderline diabetes 03/07/2012    Hypertension 09/16/2009    Hyperlipidemia 09/16/2009    [facility] 09/16/2009    CAD (coronary artery disease) 09/16/2009     Current Outpatient Prescriptions   Medication Sig Dispense Refill    metoprolol tartrate (LOPRESSOR) 50 mg tablet TAKE 1 TABLET BY MOUTH TWICE DAILY 180 Tab 0    atorvastatin (LIPITOR) 20 mg tablet TAKE 1 TABLET BY MOUTH EVERY NIGHT AT BEDTIME 90 Tab 1    chlorthalidone (HYGROTEN) 25 mg tablet TAKE 1 TABLET BY MOUTH DAILY. 90 Tab 3    PREVNAR 13, PF, 0.5 mL syrg injection TO BE ADMINISTERED BY THE PHARMACY  0    amLODIPine-benazepril (LOTREL) 5-20 mg per capsule TAKE ONE CAPSULE BY MOUTH EVERY DAY 90 Cap 3    clopidogrel (PLAVIX) 75 mg tablet TAKE 1 TAB BY MOUTH DAILY. 90 Tab 4    hydrocortisone (HYTONE) 2.5 % ointment Apply  to affected area two (2) times a day. use thin layer Monday to Friday only . 30 g 0    clobetasol (TEMOVATE) 0.05 % ointment Apply  to affected area two (2) times a day. 15 g 0    Aspirin, Buffered 81 mg tab Take  by mouth.  diphenhydrAMINE (BENADRYL ALLERGY) 25 mg tablet Take 25 mg by mouth every six (6) hours as needed for Sleep.        Past Surgical History:   Procedure Laterality Date    CARDIAC SURG PROCEDURE UNLIST  2008    cardiac stent    HX ORTHOPAEDIC  10/2013    right ankle    HX ORTHOPAEDIC  2/20/14    INCISION AND DRAINAGE RIGHT ANKLE AND HARDWARE REMOVAL      Lab Results  Component Value Date/Time   Cholesterol, total 151 01/13/2017 09:03 AM   HDL Cholesterol 62 01/13/2017 09:03 AM   LDL, calculated 73 01/13/2017 09:03 AM   Triglyceride 82 01/13/2017 09:03 AM   CHOL/HDL Ratio 2.6 09/15/2010 07:53 AM       Lab Results  Component Value Date/Time   GFR est AA 66 01/13/2017 09:03 AM   GFR est non-AA 57 01/13/2017 09:03 AM   Creatinine 0.95 01/13/2017 09:03 AM   BUN 13 01/13/2017 09:03 AM   Sodium 142 01/13/2017 09:03 AM   Potassium 4.0 01/13/2017 09:03 AM   Chloride 98 01/13/2017 09:03 AM   CO2 29 01/13/2017 09:03 AM         Review of Systems   Respiratory: Negative for shortness of breath. Cardiovascular: Negative for chest pain. Physical Exam   Constitutional: She is oriented to person, place, and time. She appears well-developed and well-nourished. No distress. HENT:   Head: Normocephalic and atraumatic. Mouth/Throat: No oropharyngeal exudate. Eyes: Conjunctivae and EOM are normal. Right eye exhibits no discharge. Left eye exhibits no discharge. Neck: Normal range of motion. Neck supple. Cardiovascular: Normal rate, regular rhythm, normal heart sounds and intact distal pulses. Exam reveals no gallop and no friction rub. No murmur heard. Pulmonary/Chest: Effort normal and breath sounds normal. No respiratory distress. She has no wheezes. She has no rales. She exhibits no tenderness. Musculoskeletal: She exhibits no edema, tenderness or deformity. Lymphadenopathy:     She has no cervical adenopathy. Neurological: She is alert and oriented to person, place, and time. Coordination normal.   Monofilament nl BLE, good peripheral pulses, no ulcers     Skin: Skin is warm and dry. No rash noted. She is not diaphoretic. No erythema. No pallor. Bunions BL  Hammertoe BL   Psychiatric: She has a normal mood and affect. Her behavior is normal.       ASSESSMENT and PLAN    ICD-10-CM ICD-9-CM    1. Essential hypertension, benign    Well controlled on chlorthalidone, lotrel, and metoprolol BID, continue no change to dose.    I10 401.1 MICROALBUMIN, UR, RAND W/ MICROALBUMIN/CREA RATIO      CBC W/O DIFF      HEMOGLOBIN A1C WITH EAG      METABOLIC PANEL, BASIC      TSH 3RD GENERATION   2. Diabetes mellitus without complication ([facility]    Pt is really more of prediabetic. a1cs have been minimally elevated in the past, repeat a1c today, eye exam is scheduled. No medications needed E11.9 250.00 MICROALBUMIN, UR, RAND W/ MICROALBUMIN/CREA RATIO      CBC W/O DIFF      HEMOGLOBIN A1C WITH EAG      METABOLIC PANEL, BASIC      TSH 3RD GENERATION       DIABETES FOOT EXAM   3. Mixed hyperlipidemia    Controlled on lipitor in January  E78.2 272.2 MICROALBUMIN, UR, RAND W/ MICROALBUMIN/CREA RATIO      CBC W/O DIFF      HEMOGLOBIN A1C WITH EAG      METABOLIC PANEL, BASIC      TSH 3RD GENERATION   4. Atherosclerosis of native coronary artery of native heart without angina pectoris    Sees Dr. Naila Smith, continues on plavix, has f/u with him in May I25.10 414.01 MICROALBUMIN, UR, RAND W/ MICROALBUMIN/CREA RATIO      CBC W/O DIFF      HEMOGLOBIN A1C WITH EAG      METABOLIC PANEL, BASIC      TSH 3RD GENERATION   5. Chronic gout without tophus, unspecified cause, unspecified site    No recent flares, no meds needed M1A. 9XX0 274.02 MICROALBUMIN, UR, RAND W/ MICROALBUMIN/CREA RATIO      CBC W/O DIFF      HEMOGLOBIN A1C WITH EAG      METABOLIC PANEL, BASIC      TSH 3RD GENERATION          Written by Sami Boo, as dictated by Rachel Dejesus MD.    Current diagnosis and concerns discussed with pt at length. Understands risks and benefits or current treatment plan and medications and accepts the treatment and medication with any possible risks.   Pt asks appropriate questions which were answered.   Pt instructed to call with any concerns or problems.

## 2017-04-20 LAB
ALBUMIN/CREAT UR: 2.4 MG/G CREAT (ref 0–30)
BUN SERPL-MCNC: 14 MG/DL (ref 8–27)
BUN/CREAT SERPL: 18 (ref 12–28)
CALCIUM SERPL-MCNC: 9.1 MG/DL (ref 8.7–10.3)
CHLORIDE SERPL-SCNC: 96 MMOL/L (ref 96–106)
CO2 SERPL-SCNC: 28 MMOL/L (ref 18–29)
CREAT SERPL-MCNC: 0.78 MG/DL (ref 0.57–1)
CREAT UR-MCNC: 157.7 MG/DL
ERYTHROCYTE [DISTWIDTH] IN BLOOD BY AUTOMATED COUNT: 14.1 % (ref 12.3–15.4)
EST. AVERAGE GLUCOSE BLD GHB EST-MCNC: 126 MG/DL
GLUCOSE SERPL-MCNC: 127 MG/DL (ref 65–99)
HBA1C MFR BLD: 6 % (ref 4.8–5.6)
HCT VFR BLD AUTO: 37.4 % (ref 34–46.6)
HGB BLD-MCNC: 12.3 G/DL (ref 11.1–15.9)
MCH RBC QN AUTO: 30.3 PG (ref 26.6–33)
MCHC RBC AUTO-ENTMCNC: 32.9 G/DL (ref 31.5–35.7)
MCV RBC AUTO: 92 FL (ref 79–97)
MICROALBUMIN UR-MCNC: 3.8 UG/ML
PLATELET # BLD AUTO: 255 X10E3/UL (ref 150–379)
POTASSIUM SERPL-SCNC: 4 MMOL/L (ref 3.5–5.2)
RBC # BLD AUTO: 4.06 X10E6/UL (ref 3.77–5.28)
SODIUM SERPL-SCNC: 141 MMOL/L (ref 134–144)
TSH SERPL DL<=0.005 MIU/L-ACNC: 1.54 UIU/ML (ref 0.45–4.5)
WBC # BLD AUTO: 8.3 X10E3/UL (ref 3.4–10.8)

## 2017-05-11 ENCOUNTER — OFFICE VISIT (OUTPATIENT)
Dept: CARDIOLOGY CLINIC | Age: 82
End: 2017-05-11

## 2017-05-11 VITALS
SYSTOLIC BLOOD PRESSURE: 144 MMHG | BODY MASS INDEX: 28.12 KG/M2 | OXYGEN SATURATION: 98 % | DIASTOLIC BLOOD PRESSURE: 70 MMHG | HEIGHT: 66 IN | RESPIRATION RATE: 16 BRPM | WEIGHT: 175 LBS | HEART RATE: 61 BPM

## 2017-05-11 DIAGNOSIS — I10 ESSENTIAL HYPERTENSION: ICD-10-CM

## 2017-05-11 DIAGNOSIS — I25.10 ATHEROSCLEROSIS OF NATIVE CORONARY ARTERY OF NATIVE HEART WITHOUT ANGINA PECTORIS: ICD-10-CM

## 2017-05-11 DIAGNOSIS — I10 ESSENTIAL HYPERTENSION, BENIGN: Primary | ICD-10-CM

## 2017-05-11 NOTE — PROGRESS NOTES
NAME:  Taty Carter   :   1935   MRN:   [id number]   PCP:  Rosanne Dorsey MD           Subjective: The patient is a [de-identified]y.o. year old female  who returns for a routine follow-up. Since the last visit, patient reports no change in exercise tolerance, chest pain, edema, medication intolerance, palpitations, shortness of breath, PND/orthopnea wheezing, sputum, syncope, dizziness or light headedness. Doing well. Mows her own lawn and remains active and asymptomatic. Feels great. Past Medical History:   Diagnosis Date    CAD (coronary artery disease) 2009    EF=65-70%; Mild-mod MR; Dr Mcneil Mantle    Diabetes [facility]) 2009    boarder line controlle by diet and exercise.  Hyperlipidemia 2009    Hypertension 2009       Social History   Substance Use Topics    Smoking status: Never Smoker    Smokeless tobacco: Never Used    Alcohol use No      Family History   Problem Relation Age of Onset    Hypertension Mother     Heart Disease Mother     Hypertension Father     Heart Disease Father         Review of Systems  Constitutional: Negative for fever, chills, and diaphoresis. Respiratory: Negative for cough, hemoptysis, sputum production, shortness of breath and wheezing. Cardiovascular: Negative for chest pain, palpitations, orthopnea, claudication, leg swelling and PND. Gastrointestinal: Negative for heartburn, nausea, vomiting, blood in stool and melena. Genitourinary: Negative for dysuria and flank pain. Musculoskeletal: Negative for joint pain and back pain. Skin: Negative for rash. Neurological: Negative for focal weakness, seizures, loss of consciousness, weakness and headaches. Endo/Heme/Allergies: Does not bruise/bleed easily. Psychiatric/Behavioral: Negative for memory loss. The patient does not have insomnia.         Objective:       Vitals:    17 0904 17 0911   BP: 140/70 144/70   Pulse: 61    Resp: 16    SpO2: 98%    Weight: 175 lb (79.4 kg)    Height: 5' 6\" (1.676 m)     Body mass index is 28.25 kg/(m^2). General PE    Gen: NAD     Mental Status - Alert. General Appearance - Not in acute distress. Neck - no JVD     Chest and Lung Exam     Inspection: Accessory muscles - No use of accessory muscles in breathing. Auscultation:   Breath sounds: - Normal.     Cardiovascular   Inspection: Jugular vein - Bilateral - Inspection Normal.   Palpation/Percussion:   Apical Impulse: - Normal.   Auscultation: Rhythm - Regular. Heart Sounds - S1 WNL and S2 WNL. No S3 or S4. Murmurs & Other Heart Sounds: Auscultation of the heart reveals - No Murmurs. Peripheral Vascular   Upper Extremity: Inspection - Bilateral - No Cyanotic nailbeds or Digital clubbing. Lower Extremity:   Palpation: Edema - Bilateral - No edema. Neuro: A&O times 3, CN and motor grossly WNL      Data Review:     EKG -  Sinus  Bradycardia  - occasional ectopic ventricular beat     -Anteroseptal infarct -age undetermined. Labs-  Lab Results   Component Value Date/Time    Cholesterol, total 151 01/13/2017 09:03 AM    HDL Cholesterol 62 01/13/2017 09:03 AM    LDL, calculated 73 01/13/2017 09:03 AM    VLDL, calculated 16 01/13/2017 09:03 AM    Triglyceride 82 01/13/2017 09:03 AM    CHOL/HDL Ratio 2.6 09/15/2010 07:53 AM         Allergies reviewed  Allergies   Allergen Reactions    Crestor [Rosuvastatin] Unknown (comments)       Medications reviewed  Current Outpatient Prescriptions   Medication Sig    metoprolol tartrate (LOPRESSOR) 50 mg tablet TAKE 1 TABLET BY MOUTH TWICE DAILY    atorvastatin (LIPITOR) 20 mg tablet TAKE 1 TABLET BY MOUTH EVERY NIGHT AT BEDTIME    chlorthalidone (HYGROTEN) 25 mg tablet TAKE 1 TABLET BY MOUTH DAILY.  amLODIPine-benazepril (LOTREL) 5-20 mg per capsule TAKE ONE CAPSULE BY MOUTH EVERY DAY    clopidogrel (PLAVIX) 75 mg tablet TAKE 1 TAB BY MOUTH DAILY.  Aspirin, Buffered 81 mg tab Take  by mouth.      No current facility-administered medications for this visit. Assessment:       ICD-10-CM ICD-9-CM    1. Essential hypertension, benign I10 401.1 AMB POC EKG ROUTINE W/ 12 LEADS, INTER & REP   2. Atherosclerosis of native coronary artery of native heart without angina pectoris I25.10 414.01    3. Essential hypertension I10 401.9         Orders Placed This Encounter    AMB POC EKG ROUTINE W/ 12 LEADS, INTER & REP     Order Specific Question:   Reason for Exam:     Answer:   routine       Patient Active Problem List   Diagnosis Code    Hypertension I10    Hyperlipidemia E78.5    Diabetes ([facility] [address] E11.9    CAD (coronary artery disease) I25.10    Mixed hyperlipidemia E78.2    Essential hypertension, benign I10    Postsurgical percutaneous transluminal coronary angioplasty status Z98.61    S/P Stent LAD ([facility]ISSA) Z95.9    Coronary atherosclerosis of native coronary artery I25.10    Borderline diabetes R73.03    Mitral valve disorders I05.9    Gout M10.9    Closed bimalleolar fracture of right ankle S82.841A    Left carotid bruit R09.89    Diabetes mellitus without complication ([facility] [address] Y44.2       Plan:     Patient presents for follow up, feeling well and stable from cardiac standpoint. Lipids in January at goal.  Asymptomatic. Continue current care and follow up in 6 mo. Repeat echo at that time for hx of mild-mod MR in 2012.     Rosi Chance Cea, ANP

## 2017-05-11 NOTE — MR AVS SNAPSHOT
Visit Information Date & Time Provider Department Dept. Phone Encounter #  
 5/11/2017  9:15 AM Libby Phipps, [address] [facility] [phone] [id number] Your Appointments 10/24/2017  8:15 AM  
ROUTINE CARE with Madeleine Mariscal, [address] [facility]) Appt Note: 6 month follow up  
 [address] [id number]  
[address] [place] [address] [address] [place] [address] Upcoming Health Maintenance Date Due DTaP/Tdap/Td series (1 - Tdap) 4/18/1958 EYE EXAM RETINAL OR DILATED Q1 9/10/2014 INFLUENZA AGE 9 TO ADULT 8/1/2017 HEMOGLOBIN A1C Q6M 10/19/2017 LIPID PANEL Q1 1/13/2018 MEDICARE YEARLY EXAM 1/14/2018 GLAUCOMA SCREENING Q2Y 4/11/2018 FOOT EXAM Q1 4/19/2018 MICROALBUMIN Q1 4/19/2018 Allergies as of 5/11/2017  Review Complete On: 5/11/2017 By: Rickey Dave LPN Severity Noted Reaction Type Reactions Crestor [Rosuvastatin]  03/07/2012    Unknown (comments) Current Immunizations  Reviewed on 4/19/2017 Name Date Influenza Vaccine  Deferred (Patient Refused) Pneumococcal Conjugate (PCV-13) 4/28/2016 Pneumococcal Polysaccharide (PPSV-23) 11/18/2014,  Deferred (Patient Refused) Not reviewed this visit You Were Diagnosed With   
  
 Codes Comments Essential hypertension, benign    -  Primary ICD-10-CM: I10 
ICD-9-CM: 401.1 Vitals BP Pulse Resp Height(growth percentile) Weight(growth percentile) SpO2  
 144/70 (BP 1 Location: Left arm, BP Patient Position: Sitting) 61 16 5' 6\" (1.676 m) 175 lb (79.4 kg) 98% BMI OB Status Smoking Status 28.25 kg/m2 Postmenopausal Never Smoker Vitals History BMI and BSA Data Body Mass Index Body Surface Area  
 28.25 kg/m 2 1.92 m 2 Preferred Pharmacy Pharmacy Name Phone [facility] [address]  [place] [phone] Your Updated Medication List  
  
   
This list is accurate as of: 5/11/17  9:27 AM.  Always use your most recent med list. amLODIPine-benazepril 5-20 mg per capsule Commonly known as:  LOTREL  
TAKE ONE CAPSULE BY MOUTH EVERY DAY  
  
 aspirin, buffered 81 mg Tab Take  by mouth. atorvastatin 20 mg tablet Commonly known as:  LIPITOR  
TAKE 1 TABLET BY MOUTH EVERY NIGHT AT BEDTIME  
  
 chlorthalidone 25 mg tablet Commonly known as:  HYGROTEN  
TAKE 1 TABLET BY MOUTH DAILY. clopidogrel 75 mg Tab Commonly known as:  PLAVIX TAKE 1 TAB BY MOUTH DAILY. metoprolol tartrate 50 mg tablet Commonly known as:  LOPRESSOR  
TAKE 1 TABLET BY MOUTH TWICE DAILY We Performed the Following AMB POC EKG ROUTINE W/ 12 LEADS, INTER & REP [17875 CPT(R)] Introducing [facility]! [facility] introduces [employer] patient portal. Now you can access parts of your medical record, email your doctor's office, and request medication refills online. 1. In your internet browser, go to [email or web address] 2. Click on the First Time User? Click Here link in the Sign In box. You will see the New Member Sign Up page. 3. Enter your [employer] Access Code exactly as it appears below. You will not need to use this code after youve completed the sign-up process. If you do not sign up before the expiration date, you must request a new code. · [employer] Access Code: [email or web address] Expires: 7/18/2017  8:51 AM 
 
4. Enter the last four digits of your Social Security Number (xxxx) and Date of Birth (mm/dd/yyyy) as indicated and click Submit. You will be taken to the next sign-up page. 5. Create a [employer] ID. This will be your Met[employer] login ID and cannot be changed, so think of one that is secure and easy to remember. 6. Create a [employer] password. You can change your password at any time. 7. Enter your Password Reset Question and Answer. This can be used at a later time if you forget your password. 8. Enter your e-mail address. You will receive e-mail notification when new information is available in [address] 9. Click Sign Up. You can now view and download portions of your medical record. 10. Click the Download Summary menu link to download a portable copy of your medical information. If you have questions, please visit the Frequently Asked Questions section of the [employer] website. Remember, [employer] is NOT to be used for urgent needs. For medical emergencies, dial 911. Now available from your iPhone and Android! Please provide this summary of care documentation to your next provider. Your primary care clinician is listed as Bernadine Prieto. If you have any questions after today's visit, please call [phone].

## 2017-06-15 RX ORDER — METOPROLOL TARTRATE 50 MG/1
TABLET ORAL
Qty: 180 TAB | Refills: 0 | Status: SHIPPED | OUTPATIENT
Start: 2017-06-15 | End: 2017-09-01 | Stop reason: SDUPTHER

## 2017-07-06 ENCOUNTER — TELEPHONE (OUTPATIENT)
Dept: CARDIOLOGY CLINIC | Age: 82
End: 2017-07-06

## 2017-07-06 RX ORDER — CLOPIDOGREL BISULFATE 75 MG/1
TABLET ORAL
Qty: 90 TAB | Refills: 3 | Status: SHIPPED | OUTPATIENT
Start: 2017-07-06 | End: 2018-05-24 | Stop reason: SDUPTHER

## 2017-08-25 RX ORDER — ATORVASTATIN CALCIUM 20 MG/1
TABLET, FILM COATED ORAL
Qty: 90 TAB | Refills: 0 | Status: SHIPPED | OUTPATIENT
Start: 2017-08-25 | End: 2017-12-05 | Stop reason: SDUPTHER

## 2017-09-01 RX ORDER — ATORVASTATIN CALCIUM 20 MG/1
TABLET, FILM COATED ORAL
Qty: 90 TAB | Refills: 0 | Status: SHIPPED | OUTPATIENT
Start: 2017-09-01 | End: 2017-11-02 | Stop reason: SDUPTHER

## 2017-09-01 RX ORDER — METOPROLOL TARTRATE 50 MG/1
TABLET ORAL
Qty: 180 TAB | Refills: 0 | Status: SHIPPED | OUTPATIENT
Start: 2017-09-01 | End: 2017-12-05 | Stop reason: SDUPTHER

## 2017-10-24 ENCOUNTER — HOSPITAL ENCOUNTER (OUTPATIENT)
Dept: LAB | Age: 82
Discharge: HOME | End: 2017-10-24
Payer: MEDICARE

## 2017-10-24 ENCOUNTER — OFFICE VISIT (OUTPATIENT)
Dept: INTERNAL MEDICINE CLINIC | Age: 82
End: 2017-10-24

## 2017-10-24 VITALS
TEMPERATURE: 97.6 F | HEART RATE: 67 BPM | SYSTOLIC BLOOD PRESSURE: 148 MMHG | BODY MASS INDEX: 27.16 KG/M2 | RESPIRATION RATE: 16 BRPM | OXYGEN SATURATION: 97 % | HEIGHT: 66 IN | DIASTOLIC BLOOD PRESSURE: 56 MMHG | WEIGHT: 169 LBS

## 2017-10-24 DIAGNOSIS — R73.01 IFG (IMPAIRED FASTING GLUCOSE): ICD-10-CM

## 2017-10-24 DIAGNOSIS — E87.6 HYPOKALEMIA: ICD-10-CM

## 2017-10-24 DIAGNOSIS — I10 ESSENTIAL HYPERTENSION, BENIGN: Primary | ICD-10-CM

## 2017-10-24 DIAGNOSIS — E78.2 MIXED HYPERLIPIDEMIA: ICD-10-CM

## 2017-10-24 DIAGNOSIS — I25.10 ATHEROSCLEROSIS OF NATIVE CORONARY ARTERY OF NATIVE HEART WITHOUT ANGINA PECTORIS: ICD-10-CM

## 2017-10-24 PROCEDURE — 80061 LIPID PANEL: CPT

## 2017-10-24 PROCEDURE — 36415 COLL VENOUS BLD VENIPUNCTURE: CPT

## 2017-10-24 PROCEDURE — 83036 HEMOGLOBIN GLYCOSYLATED A1C: CPT

## 2017-10-24 PROCEDURE — 80053 COMPREHEN METABOLIC PANEL: CPT

## 2017-10-24 RX ORDER — AMLODIPINE AND BENAZEPRIL HYDROCHLORIDE 5; 20 MG/1; MG/1
CAPSULE ORAL
Qty: 90 CAP | Refills: 3 | Status: SHIPPED | OUTPATIENT
Start: 2017-10-24 | End: 2018-10-16 | Stop reason: SDUPTHER

## 2017-10-24 NOTE — LETTER
10/25/2017 1:59 PM 
 
Ms. Nilam Escobar [phone] Dear Nilam Giles: 
 
Please find your most recent results below. Resulted Orders LIPID PANEL Result Value Ref Range Cholesterol, total 146 100 - 199 mg/dL Triglyceride 115 0 - 149 mg/dL HDL Cholesterol 56 >39 mg/dL VLDL, calculated 23 5 - 40 mg/dL LDL, calculated 67 0 - 99 mg/dL Narrative Performed at:  [address]  [id number] : Whit Patterson MD, Phone:  [phone] METABOLIC PANEL, COMPREHENSIVE Result Value Ref Range Glucose 153 (H) 65 - 99 mg/dL BUN 12 8 - 27 mg/dL Creatinine 0.70 0.57 - 1.00 mg/dL GFR est non-AA 82 >59 mL/min/1.73 GFR est AA 95 >59 mL/min/1.73  
 BUN/Creatinine ratio 17 12 - 28 Sodium 138 134 - 144 mmol/L Potassium 3.2 (L) 3.5 - 5.2 mmol/L Chloride 92 (L) 96 - 106 mmol/L  
 CO2 29 18 - 29 mmol/L Calcium 9.1 8.7 - 10.3 mg/dL Protein, total 6.4 6.0 - 8.5 g/dL Albumin 4.1 3.5 - 4.7 g/dL GLOBULIN, TOTAL 2.3 1.5 - 4.5 g/dL A-G Ratio 1.8 1.2 - 2.2 Bilirubin, total 0.3 0.0 - 1.2 mg/dL Alk. phosphatase 93 39 - 117 IU/L  
 AST (SGOT) 24 0 - 40 IU/L  
 ALT (SGPT) 19 0 - 32 IU/L Narrative Performed at:  [address]  [id number] : Whit Patterson MD, Phone:  [phone] HEMOGLOBIN A1C WITH EAG Result Value Ref Range Hemoglobin A1c 5.8 (H) 4.8 - 5.6 % Comment:  
            Pre-diabetes: 5.7 - 6.4 Diabetes: >6.4 Glycemic control for adults with diabetes: <7.0 Estimated average glucose 120 mg/dL Narrative Performed at:  [address]  [id number] : Whit Patterson MD, Phone:  [phone] RECOMMENDATIONS: 
 
Potassium low on recent labs (likely because you ran out of lotrel which increases potassium levels). Restart lotrel. Repeat labs in 1 month to recheck Potassium levels. The lab orders are with this letter. Please call me if you have any questions: [phone] Sincerely, 
 
 
Scot Raza MD

## 2017-10-24 NOTE — MR AVS SNAPSHOT
Visit Information Date & Time Provider Department Dept. Phone Encounter #  
 10/24/2017  8:15 AM Margaretmary Saint, [address] [id number] Follow-up Instructions Return in about 6 months (around 4/24/2018). Your Appointments 11/2/2017 10:30 AM  
6 MONTH with Ova Kayser, MD  
[facility] [address]) Appt Note: 6 month per Dr. Melissa Ugalde, no cp  
 [id number] [facility]  
[phone] [id number] [facility] Upcoming Health Maintenance Date Due DTaP/Tdap/Td series (1 - Tdap) 4/18/1956 HEMOGLOBIN A1C Q6M 10/19/2017 LIPID PANEL Q1 1/13/2018 MEDICARE YEARLY EXAM 1/14/2018 FOOT EXAM Q1 4/19/2018 MICROALBUMIN Q1 4/19/2018 EYE EXAM RETINAL OR DILATED Q1 5/9/2018 GLAUCOMA SCREENING Q2Y 5/9/2019 Allergies as of 10/24/2017  Review Complete On: 10/24/2017 By: Margaretmary Saint, MD  
  
 Severity Noted Reaction Type Reactions Crestor [Rosuvastatin]  03/07/2012    Unknown (comments) Current Immunizations  Reviewed on 4/19/2017 Name Date Influenza Nasal Vaccine 9/20/2017 Influenza Vaccine  Deferred (Patient Refused) Pneumococcal Conjugate (PCV-13) 4/28/2016 Pneumococcal Polysaccharide (PPSV-23) 11/18/2014,  Deferred (Patient Refused) Not reviewed this visit You Were Diagnosed With   
  
 Codes Comments Essential hypertension, benign    -  Primary ICD-10-CM: I10 
ICD-9-CM: 401.1 Atherosclerosis of native coronary artery of native heart without angina pectoris     ICD-10-CM: I25.10 ICD-9-CM: 414.01 Mixed hyperlipidemia     ICD-10-CM: E78.2 ICD-9-CM: 272.2 IFG (impaired fasting glucose)     ICD-10-CM: R73.01 
ICD-9-CM: 790.21 Vitals BP Pulse Temp Resp Height(growth percentile) Weight(growth percentile)  182/67 (BP 1 Location: Left arm, BP Patient Position: Sitting) 67 97.6 °F (36.4 °C) (Oral) 16 5' 6\" (1.676 m) 169 lb (76.7 kg) SpO2 BMI OB Status Smoking Status 97% 27.28 kg/m2 Postmenopausal Never Smoker BMI and BSA Data Body Mass Index Body Surface Area  
 27.28 kg/m 2 1.89 m 2 Preferred Pharmacy Pharmacy Name Phone [facility] [address]  [place] [phone] Your Updated Medication List  
  
   
This list is accurate as of: 10/24/17  8:17 AM.  Always use your most recent med list. amLODIPine-benazepril 5-20 mg per capsule Commonly known as:  LOTREL  
TAKE ONE CAPSULE BY MOUTH EVERY DAY  
  
 aspirin, buffered 81 mg Tab Take  by mouth. * atorvastatin 20 mg tablet Commonly known as:  LIPITOR  
TAKE 1 TABLET BY MOUTH EVERY NIGHT AT BEDTIME  
  
 * atorvastatin 20 mg tablet Commonly known as:  LIPITOR  
TAKE 1 TABLET BY MOUTH EVERY NIGHT AT BEDTIME  
  
 chlorthalidone 25 mg tablet Commonly known as:  HYGROTEN  
TAKE 1 TABLET BY MOUTH DAILY. clopidogrel 75 mg Tab Commonly known as:  PLAVIX TAKE 1 TABLET BY MOUTH EVERY DAY  
  
 metoprolol tartrate 50 mg tablet Commonly known as:  LOPRESSOR  
TAKE 1 TABLET BY MOUTH TWICE DAILY * Notice: This list has 2 medication(s) that are the same as other medications prescribed for you. Read the directions carefully, and ask your doctor or other care provider to review them with you. Prescriptions Sent to Pharmacy Refills  
 amLODIPine-benazepril (LOTREL) 5-20 mg per capsule 3 Sig: TAKE ONE CAPSULE BY MOUTH EVERY DAY Class: Normal  
 Pharmacy: [facility] [address] #: [phone] We Performed the Following HEMOGLOBIN A1C WITH EAG [29999 CPT(R)] LIPID PANEL [06934 CPT(R)] METABOLIC PANEL, COMPREHENSIVE [54038 CPT(R)] Follow-up Instructions Return in about 6 months (around 4/24/2018). Patient Instructions Restart lotrel Introducing [facility]! Cory Ortiz introduces [employer] patient portal. Now you can access parts of your medical record, email your doctor's office, and request medication refills online. 1. In your internet browser, go to [email or web address] 2. Click on the First Time User? Click Here link in the Sign In box. You will see the New Member Sign Up page. 3. Enter your [employer] Access Code exactly as it appears below. You will not need to use this code after youve completed the sign-up process. If you do not sign up before the expiration date, you must request a new code. · [employer] Access Code: [email or web address] Expires: 1/22/2018  8:17 AM 
 
4. Enter the last four digits of your Social Security Number (xxxx) and Date of Birth (mm/dd/yyyy) as indicated and click Submit. You will be taken to the next sign-up page. 5. Create a [employer] ID. This will be your [employer] login ID and cannot be changed, so think of one that is secure and easy to remember. 6. Create a [employer] password. You can change your password at any time. 7. Enter your Password Reset Question and Answer. This can be used at a later time if you forget your password. 8. Enter your e-mail address. You will receive e-mail notification when new information is available in [address] 9. Click Sign Up. You can now view and download portions of your medical record. 10. Click the Download Summary menu link to download a portable copy of your medical information. If you have questions, please visit the Frequently Asked Questions section of the [employer] website. Remember, [employer] is NOT to be used for urgent needs. For medical emergencies, dial 911. Now available from your iPhone and Android! Please provide this summary of care documentation to your next provider. Your primary care clinician is listed as Ayden Lama. If you have any questions after today's visit, please call [phone].

## 2017-10-24 NOTE — PROGRESS NOTES
HISTORY OF PRESENT ILLNESS  Matthew Lopez is a [de-identified] y.o. female. HPI   Last here 4/19/17. Pt is here to f/u on chronic conditions. Pt brought in all pill bottles - reviewed.     BP is 182/67, will repeat this today   BP at home running around 134-135/67 this AM, 121/70 and 131/69  Continues on chlorthalidone 25mg daily, as well as metoprolol 50mg BID  Pt is supposed to be taking lotrel 5-20mg daily   However, pt ran out of lotrel x 1 moth  Advised her to restart lotrel today    Wt is down 6 lbs since last visit   Congratulated her on this feat  Pt has been walking regularly  Pt has been eating more baked fish and pork chops, as well as vegetables    Pt c/o cramps in her legs recently  Pt states that she has not been eating too much salt     Pt follows with Dr. Kely Claudio (cardio)   Reviewed last notes from [facility] 5/11/17: Patient presents for follow up, feeling well and stable from cardiac standpoint. Lipids in January at goal.  Asymptomatic. Continue current care and follow up in 6 mo. Repeat echo at that time for hx of mild-mod MR in 2012.   Flu in 2 weeks  Continues on plavix 75mg daily per cardio  She also takes ASA 81mg daily     Pt broke her tooth on a corn kernel  Pt recently had her tooth fixed      Reviewed last labs 4/17   Repeat labs today      Continues on lipitor 20mg daily for cholesterol - at goal in January   Recall could not tolerate crestor      Pt previously followed with Dr. Cheryl Munson (derm) for eczema  Pt has not had any flares recently  Pt is doing well overall      ACP: SDM is her , Mic Cade.     PREVENTIVE:    Colonoscopy: declines further  Pap: 9/2010, declines further  Mammogram: declines further  Dexa: declines further  Tdap: never completed  Pneumovax: 11/18/2014  Adfeumo91: 4/28/2016  Zostavax: declines  Flu shot: Fall 2017 at [facility] exam: 4/29/17, normal  Microalbumin: 4/17, no protein in the urine  A1c:  10/12 5.6, 4/13 6.3, 9/13 5.9, 1/14 5.6, 7/14 5.3, 11/14 5.5, 9/15 5.9, 1/16 6.1, 7/16 6.1, 1/17 5.9, 4/17 6.0  Eye exam: Dr. Yovani Carlton, 4/17  Lipids: 1/17 LDL 73    Patient Active Problem List    Diagnosis Date Noted    [facility] 24/64/7932    Left carotid bruit 01/16/2015    Closed bimalleolar fracture of right ankle 10/18/2013    Gout 06/11/2013    Mitral valve disorders(424.0) 05/08/2012    Mixed hyperlipidemia 03/07/2012    Essential hypertension, benign 03/07/2012    Postsurgical percutaneous transluminal coronary angioplasty status 03/07/2012    S/P Stent LAD (LISSA) 03/07/2012    Coronary atherosclerosis of native coronary artery 03/07/2012    Borderline diabetes 03/07/2012    Hypertension 09/16/2009    Hyperlipidemia 09/16/2009    [facility] ([facility] 09/16/2009    CAD (coronary artery disease) 09/16/2009     Current Outpatient Prescriptions   Medication Sig Dispense Refill    atorvastatin (LIPITOR) 20 mg tablet TAKE 1 TABLET BY MOUTH EVERY NIGHT AT BEDTIME 90 Tab 0    metoprolol tartrate (LOPRESSOR) 50 mg tablet TAKE 1 TABLET BY MOUTH TWICE DAILY 180 Tab 0    atorvastatin (LIPITOR) 20 mg tablet TAKE 1 TABLET BY MOUTH EVERY NIGHT AT BEDTIME 90 Tab 0    clopidogrel (PLAVIX) 75 mg tab TAKE 1 TABLET BY MOUTH EVERY DAY 90 Tab 3    chlorthalidone (HYGROTEN) 25 mg tablet TAKE 1 TABLET BY MOUTH DAILY. 90 Tab 3    amLODIPine-benazepril (LOTREL) 5-20 mg per capsule TAKE ONE CAPSULE BY MOUTH EVERY DAY 90 Cap 3    Aspirin, Buffered 81 mg tab Take  by mouth.        Past Surgical History:   Procedure Laterality Date    CARDIAC SURG PROCEDURE UNLIST  2008    cardiac stent    HX ORTHOPAEDIC  10/2013    right ankle    HX ORTHOPAEDIC  2/20/14    INCISION AND DRAINAGE RIGHT ANKLE AND HARDWARE REMOVAL      Lab Results  Component Value Date/Time   WBC 8.3 04/19/2017 09:04 AM   HGB 12.3 04/19/2017 09:04 AM   HCT 37.4 04/19/2017 09:04 AM   PLATELET 566 97/43/3026 09:04 AM   MCV 92 04/19/2017 09:04 AM     Lab Results  Component Value Date/Time   Cholesterol, total 151 01/13/2017 09:03 AM   HDL Cholesterol 62 01/13/2017 09:03 AM   LDL, calculated 73 01/13/2017 09:03 AM   Triglyceride 82 01/13/2017 09:03 AM   CHOL/HDL Ratio 2.6 09/15/2010 07:53 AM     Lab Results  Component Value Date/Time   GFR est non-AA 72 04/19/2017 09:04 AM   GFR est AA 83 04/19/2017 09:04 AM   Creatinine 0.78 04/19/2017 09:04 AM   BUN 14 04/19/2017 09:04 AM   Sodium 141 04/19/2017 09:04 AM   Potassium 4.0 04/19/2017 09:04 AM   Chloride 96 04/19/2017 09:04 AM   CO2 28 04/19/2017 09:04 AM          Review of Systems   Respiratory: Negative for shortness of breath. Cardiovascular: Negative for chest pain. Physical Exam   Constitutional: She is oriented to person, place, and time. She appears well-developed and well-nourished. No distress. HENT:   Head: Normocephalic and atraumatic. Mouth/Throat: Oropharynx is clear and moist. No oropharyngeal exudate. Eyes: Conjunctivae and EOM are normal. Right eye exhibits no discharge. Left eye exhibits no discharge. Neck: Normal range of motion. Neck supple. Cardiovascular: Normal rate, regular rhythm and normal heart sounds. Exam reveals no gallop and no friction rub. No murmur heard. Pulmonary/Chest: Effort normal and breath sounds normal. No respiratory distress. She has no wheezes. She has no rales. She exhibits no tenderness. Musculoskeletal: Normal range of motion. She exhibits no edema, tenderness or deformity. Lymphadenopathy:     She has no cervical adenopathy. Neurological: She is alert and oriented to person, place, and time. Coordination normal.   Skin: Skin is warm and dry. No rash noted. She is not diaphoretic. No erythema. No pallor. Psychiatric: She has a normal mood and affect. Her behavior is normal.       ASSESSMENT and PLAN    ICD-10-CM ICD-9-CM    1.  Essential hypertension, benign    BP elevated today because pt ran out of her lotrel, improved at repeat, but above goal, will restart lotrel, continue chlorthalidone and metoprolol, pt has f/u with Dr. Rosanna Vargas in 2 weeks and he can reassess her BP then I10 401.1 LIPID PANEL      METABOLIC PANEL, COMPREHENSIVE      HEMOGLOBIN A1C WITH EAG   2. Atherosclerosis of native coronary artery of native heart without angina pectoris    On plavix, continue I25.10 414.01 LIPID PANEL      METABOLIC PANEL, COMPREHENSIVE      HEMOGLOBIN A1C WITH EAG   3. Mixed hyperlipidemia    Controlled on lipitor, will repeat lipids today E78.2 272.2 LIPID PANEL      METABOLIC PANEL, COMPREHENSIVE      HEMOGLOBIN A1C WITH EAG   4. IFG (impaired fasting glucose)    Check a1c today R73.01 790.21 LIPID PANEL      METABOLIC PANEL, COMPREHENSIVE      HEMOGLOBIN A1C WITH EAG        Scribed by Salina Cervantes of [address] [place] [address] [address], as dictated by Dr. Griselda Simmer. Current diagnosis and concerns discussed with pt at length. Pt understands risks and benefits or current treatment plan and medications, and accepts the treatment and medication with any possible risks. Pt asks appropriate questions, which were answered. Pt was instructed to call with any concerns or problems. This note will not be viewable in [address]

## 2017-10-25 LAB
ALBUMIN SERPL-MCNC: 4.1 G/DL (ref 3.5–4.7)
ALBUMIN/GLOB SERPL: 1.8 {RATIO} (ref 1.2–2.2)
ALP SERPL-CCNC: 93 IU/L (ref 39–117)
ALT SERPL-CCNC: 19 IU/L (ref 0–32)
AST SERPL-CCNC: 24 IU/L (ref 0–40)
BILIRUB SERPL-MCNC: 0.3 MG/DL (ref 0–1.2)
BUN SERPL-MCNC: 12 MG/DL (ref 8–27)
BUN/CREAT SERPL: 17 (ref 12–28)
CALCIUM SERPL-MCNC: 9.1 MG/DL (ref 8.7–10.3)
CHLORIDE SERPL-SCNC: 92 MMOL/L (ref 96–106)
CHOLEST SERPL-MCNC: 146 MG/DL (ref 100–199)
CO2 SERPL-SCNC: 29 MMOL/L (ref 18–29)
CREAT SERPL-MCNC: 0.7 MG/DL (ref 0.57–1)
EST. AVERAGE GLUCOSE BLD GHB EST-MCNC: 120 MG/DL
GFR SERPLBLD CREATININE-BSD FMLA CKD-EPI: 82 ML/MIN/1.73
GFR SERPLBLD CREATININE-BSD FMLA CKD-EPI: 95 ML/MIN/1.73
GLOBULIN SER CALC-MCNC: 2.3 G/DL (ref 1.5–4.5)
GLUCOSE SERPL-MCNC: 153 MG/DL (ref 65–99)
HBA1C MFR BLD: 5.8 % (ref 4.8–5.6)
HDLC SERPL-MCNC: 56 MG/DL
LDLC SERPL CALC-MCNC: 67 MG/DL (ref 0–99)
POTASSIUM SERPL-SCNC: 3.2 MMOL/L (ref 3.5–5.2)
PROT SERPL-MCNC: 6.4 G/DL (ref 6–8.5)
SODIUM SERPL-SCNC: 138 MMOL/L (ref 134–144)
TRIGL SERPL-MCNC: 115 MG/DL (ref 0–149)
VLDLC SERPL CALC-MCNC: 23 MG/DL (ref 5–40)

## 2017-10-25 NOTE — PROGRESS NOTES
Potassium low on recent labs (likely b/c she ran out of lotrel which increases k levels)    She should have restarted lotrel yesterday    Repeat bmp in 1 month

## 2017-11-02 ENCOUNTER — OFFICE VISIT (OUTPATIENT)
Dept: CARDIOLOGY CLINIC | Age: 82
End: 2017-11-02

## 2017-11-02 VITALS
DIASTOLIC BLOOD PRESSURE: 64 MMHG | WEIGHT: 169.7 LBS | HEART RATE: 73 BPM | OXYGEN SATURATION: 96 % | HEIGHT: 66 IN | BODY MASS INDEX: 27.27 KG/M2 | RESPIRATION RATE: 18 BRPM | SYSTOLIC BLOOD PRESSURE: 124 MMHG

## 2017-11-02 DIAGNOSIS — E87.6 HYPOKALEMIA: ICD-10-CM

## 2017-11-02 DIAGNOSIS — E78.2 MIXED HYPERLIPIDEMIA: ICD-10-CM

## 2017-11-02 DIAGNOSIS — I25.10 ATHEROSCLEROSIS OF NATIVE CORONARY ARTERY OF NATIVE HEART WITHOUT ANGINA PECTORIS: Primary | ICD-10-CM

## 2017-11-02 DIAGNOSIS — Z95.820 S/P ANGIOPLASTY WITH STENT: ICD-10-CM

## 2017-11-02 DIAGNOSIS — I10 ESSENTIAL HYPERTENSION, BENIGN: ICD-10-CM

## 2017-11-02 DIAGNOSIS — Z98.61 POSTSURGICAL PERCUTANEOUS TRANSLUMINAL CORONARY ANGIOPLASTY STATUS: ICD-10-CM

## 2017-11-02 DIAGNOSIS — E11.9 DIABETES MELLITUS WITHOUT COMPLICATION (HCC): ICD-10-CM

## 2017-11-02 NOTE — MR AVS SNAPSHOT
Visit Information Date & Time Provider Department Dept. Phone Encounter #  
 11/2/2017 10:30 AM Sulema Ale, [address] [facility] [phone] [id number] Your Appointments 4/24/2018  8:15 AM  
ROUTINE CARE with Barbra Nunez, [address] [facility] [address] [facility]) Appt Note: 6 month f/u  
 [address] [facility] [place] [place] [address] [phone]  
[address] [facility] [address] [place] [address] Upcoming Health Maintenance Date Due DTaP/Tdap/Td series (1 - Tdap) 4/18/1956 MEDICARE YEARLY EXAM 1/14/2018 FOOT EXAM Q1 4/19/2018 MICROALBUMIN Q1 4/19/2018 HEMOGLOBIN A1C Q6M 4/24/2018 EYE EXAM RETINAL OR DILATED Q1 5/9/2018 LIPID PANEL Q1 10/24/2018 GLAUCOMA SCREENING Q2Y 5/9/2019 Allergies as of 11/2/2017  Review Complete On: 11/2/2017 By: Aleah Fuller LPN Severity Noted Reaction Type Reactions Crestor [Rosuvastatin]  03/07/2012    Unknown (comments) Current Immunizations  Reviewed on 4/19/2017 Name Date Influenza Nasal Vaccine 9/20/2017 Influenza Vaccine 9/20/2017,  Deferred (Patient Refused) Pneumococcal Conjugate (PCV-13) 4/28/2016 Pneumococcal Polysaccharide (PPSV-23) 11/18/2014,  Deferred (Patient Refused) Not reviewed this visit You Were Diagnosed With   
  
 Codes Comments Essential hypertension, benign    -  Primary ICD-10-CM: I10 
ICD-9-CM: 401.1 Mixed hyperlipidemia     ICD-10-CM: E78.2 ICD-9-CM: 272.2 Atherosclerosis of native coronary artery of native heart without angina pectoris     ICD-10-CM: I25.10 ICD-9-CM: 414.01 Vitals BP Pulse Resp Height(growth percentile) Weight(growth percentile) SpO2  
 124/64 (BP 1 Location: Right arm, BP Patient Position: Sitting) 73 18 5' 6\" (1.676 m) 169 lb 11.2 oz (77 kg) 96% BMI OB Status Smoking Status 27.39 kg/m2 Postmenopausal Never Smoker Vitals History BMI and BSA Data Body Mass Index Body Surface Area  
 27.39 kg/m 2 1.89 m 2 Preferred Pharmacy Pharmacy Name Phone [facility] [address]  [place] [phone] Your Updated Medication List  
  
   
This list is accurate as of: 11/2/17 11:10 AM.  Always use your most recent med list. amLODIPine-benazepril 5-20 mg per capsule Commonly known as:  LOTREL  
TAKE ONE CAPSULE BY MOUTH EVERY DAY  
  
 aspirin, buffered 81 mg Tab Take  by mouth. atorvastatin 20 mg tablet Commonly known as:  LIPITOR  
TAKE 1 TABLET BY MOUTH EVERY NIGHT AT BEDTIME  
  
 chlorthalidone 25 mg tablet Commonly known as:  HYGROTEN  
TAKE 1 TABLET BY MOUTH DAILY. clopidogrel 75 mg Tab Commonly known as:  PLAVIX TAKE 1 TABLET BY MOUTH EVERY DAY  
  
 metoprolol tartrate 50 mg tablet Commonly known as:  LOPRESSOR  
TAKE 1 TABLET BY MOUTH TWICE DAILY We Performed the Following AMB POC EKG ROUTINE W/ 12 LEADS, INTER & REP [46679 CPT(R)] Introducing [facility] & HEALTH SERVICES! [facility] introduces [employer] patient portal. Now you can access parts of your medical record, email your doctor's office, and request medication refills online. 1. In your internet browser, go to [email or web address] 2. Click on the First Time User? Click Here link in the Sign In box. You will see the New Member Sign Up page. 3. Enter your [employer] Access Code exactly as it appears below. You will not need to use this code after youve completed the sign-up process. If you do not sign up before the expiration date, you must request a new code. · St[employer]cher Access Code: [email or web address] Expires: 1/22/2018  8:17 AM 
 
4. Enter the last four digits of your Social Security Number (xxxx) and Date of Birth (mm/dd/yyyy) as indicated and click Submit. You will be taken to the next sign-up page. 5. Create a [employer] ID. This will be your [employer] login ID and cannot be changed, so think of one that is secure and easy to remember. 6. Create a [employer] password. You can change your password at any time. 7. Enter your Password Reset Question and Answer. This can be used at a later time if you forget your password. 8. Enter your e-mail address. You will receive e-mail notification when new information is available in [address] 9. Click Sign Up. You can now view and download portions of your medical record. 10. Click the Download Summary menu link to download a portable copy of your medical information. If you have questions, please visit the Frequently Asked Questions section of the [employer] website. Remember, [employer] is NOT to be used for urgent needs. For medical emergencies, dial 911. Now available from your iPhone and Android! Please provide this summary of care documentation to your next provider. Your primary care clinician is listed as Michelle Vu. If you have any questions after today's visit, please call [phone].

## 2017-12-05 ENCOUNTER — TELEPHONE (OUTPATIENT)
Dept: CARDIOLOGY CLINIC | Age: 82
End: 2017-12-05

## 2017-12-05 ENCOUNTER — APPOINTMENT (OUTPATIENT)
Dept: INTERNAL MEDICINE CLINIC | Age: 82
End: 2017-12-05

## 2017-12-05 ENCOUNTER — HOSPITAL ENCOUNTER (OUTPATIENT)
Dept: LAB | Age: 82
Discharge: HOME | End: 2017-12-05
Payer: MEDICARE

## 2017-12-05 DIAGNOSIS — E87.6 HYPOKALEMIA: ICD-10-CM

## 2017-12-05 PROCEDURE — 36415 COLL VENOUS BLD VENIPUNCTURE: CPT

## 2017-12-05 PROCEDURE — 80048 BASIC METABOLIC PNL TOTAL CA: CPT

## 2017-12-05 NOTE — TELEPHONE ENCOUNTER
Pt needs refills on  Chlorthaliadone, Atorvastatin, and metoprolol tartrate sent to pharmacy.     Thanks,    [facility]/[employer]

## 2017-12-06 LAB
BUN SERPL-MCNC: 12 MG/DL (ref 8–27)
BUN/CREAT SERPL: 15 (ref 12–28)
CALCIUM SERPL-MCNC: 9.5 MG/DL (ref 8.7–10.3)
CHLORIDE SERPL-SCNC: 94 MMOL/L (ref 96–106)
CO2 SERPL-SCNC: 32 MMOL/L (ref 18–29)
CREAT SERPL-MCNC: 0.78 MG/DL (ref 0.57–1)
GFR SERPLBLD CREATININE-BSD FMLA CKD-EPI: 72 ML/MIN/1.73
GFR SERPLBLD CREATININE-BSD FMLA CKD-EPI: 83 ML/MIN/1.73
GLUCOSE SERPL-MCNC: 97 MG/DL (ref 65–99)
POTASSIUM SERPL-SCNC: 4 MMOL/L (ref 3.5–5.2)
SODIUM SERPL-SCNC: 140 MMOL/L (ref 134–144)

## 2017-12-07 RX ORDER — ATORVASTATIN CALCIUM 20 MG/1
TABLET, FILM COATED ORAL
Qty: 90 TAB | Refills: 3 | Status: SHIPPED | OUTPATIENT
Start: 2017-12-07 | End: 2018-11-30 | Stop reason: SDUPTHER

## 2017-12-07 RX ORDER — METOPROLOL TARTRATE 50 MG/1
TABLET ORAL
Qty: 180 TAB | Refills: 3 | Status: SHIPPED | OUTPATIENT
Start: 2017-12-07 | End: 2018-11-30 | Stop reason: SDUPTHER

## 2017-12-07 RX ORDER — CHLORTHALIDONE 25 MG/1
TABLET ORAL
Qty: 90 TAB | Refills: 3 | Status: SHIPPED | OUTPATIENT
Start: 2017-12-07 | End: 2018-11-30 | Stop reason: SDUPTHER

## 2017-12-07 NOTE — TELEPHONE ENCOUNTER
Pt called this morning and states that she is completely out of all three of the medication listed for a refill. Pt would like for doctor to send the medications today. Please call cell # to let patient know when the medication has been sent.     Thanks

## 2018-02-09 ENCOUNTER — OFFICE VISIT (OUTPATIENT)
Dept: INTERNAL MEDICINE CLINIC | Age: 83
End: 2018-02-09

## 2018-02-09 VITALS
HEIGHT: 66 IN | TEMPERATURE: 100 F | WEIGHT: 167 LBS | BODY MASS INDEX: 26.84 KG/M2 | OXYGEN SATURATION: 98 % | DIASTOLIC BLOOD PRESSURE: 59 MMHG | HEART RATE: 77 BPM | SYSTOLIC BLOOD PRESSURE: 137 MMHG

## 2018-02-09 DIAGNOSIS — R05.9 COUGH: ICD-10-CM

## 2018-02-09 DIAGNOSIS — R52 BODY ACHES: Primary | ICD-10-CM

## 2018-02-09 DIAGNOSIS — R50.9 FEVER, UNSPECIFIED FEVER CAUSE: ICD-10-CM

## 2018-02-09 LAB
QUICKVUE INFLUENZA TEST: POSITIVE
VALID INTERNAL CONTROL?: YES

## 2018-02-09 RX ORDER — OSELTAMIVIR PHOSPHATE 75 MG/1
75 CAPSULE ORAL 2 TIMES DAILY
Qty: 10 CAP | Refills: 0 | Status: SHIPPED | OUTPATIENT
Start: 2018-02-09 | End: 2018-02-14

## 2018-02-09 RX ORDER — AZITHROMYCIN 250 MG/1
250 TABLET, FILM COATED ORAL SEE ADMIN INSTRUCTIONS
Qty: 6 TAB | Refills: 0 | Status: SHIPPED | OUTPATIENT
Start: 2018-02-09 | End: 2018-02-14

## 2018-02-09 NOTE — PROGRESS NOTES
HISTORY OF PRESENT ILLNESS  Silvia Lopez is a [de-identified] y.o. female. HPI   C/o feeling sick x 5days with myalgia, runny nose , productive cough  Fever started today   Some loose stools  No cp sob or sxs of dehydration  Had flu shot earlier this year    Patient Active Problem List    Diagnosis Date Noted    Diabetes mellitus without complication ([facility] 15/42/1472    Left carotid bruit 01/16/2015    Closed bimalleolar fracture of right ankle 10/18/2013    Gout 06/11/2013    Mitral valve disorders(424.0) 05/08/2012    Mixed hyperlipidemia 03/07/2012    Essential hypertension, benign 03/07/2012    Postsurgical percutaneous transluminal coronary angioplasty status 03/07/2012    S/P Stent LAD (LISSA) 03/07/2012    Coronary atherosclerosis of native coronary artery 03/07/2012    Borderline diabetes 03/07/2012    Hypertension 09/16/2009    Hyperlipidemia 09/16/2009    Diabetes ([facility] 09/16/2009    CAD (coronary artery disease) 09/16/2009     Current Outpatient Prescriptions   Medication Sig Dispense Refill    oseltamivir (TAMIFLU) 75 mg capsule Take 1 Cap by mouth two (2) times a day for 5 days. 10 Cap 0    azithromycin (ZITHROMAX) 250 mg tablet Take 1 Tab by mouth See Admin Instructions for 5 days. 6 Tab 0    atorvastatin (LIPITOR) 20 mg tablet TAKE 1 TABLET BY MOUTH EVERY NIGHT AT BEDTIME 90 Tab 3    metoprolol tartrate (LOPRESSOR) 50 mg tablet TAKE 1 TABLET BY MOUTH TWICE DAILY 180 Tab 3    chlorthalidone (HYGROTEN) 25 mg tablet TAKE 1 TABLET BY MOUTH DAILY. 90 Tab 3    amLODIPine-benazepril (LOTREL) 5-20 mg per capsule TAKE ONE CAPSULE BY MOUTH EVERY DAY 90 Cap 3    clopidogrel (PLAVIX) 75 mg tab TAKE 1 TABLET BY MOUTH EVERY DAY 90 Tab 3    Aspirin, Buffered 81 mg tab Take  by mouth. Allergies   Allergen Reactions    Crestor [Rosuvastatin] Unknown (comments)           ROS    Physical Exam   Constitutional: She appears well-developed and well-nourished.    Appears stated age   HENT:   Mouth/Throat: Oropharynx is clear and moist.   Neck: No JVD present. No tracheal deviation present. No thyromegaly present. Cardiovascular: Normal rate, regular rhythm and normal heart sounds. Exam reveals no gallop and no friction rub. No murmur heard. Pulmonary/Chest: Effort normal and breath sounds normal. No respiratory distress. She has no wheezes. Abdominal: Soft. Bowel sounds are normal.   Musculoskeletal: She exhibits no edema. Lymphadenopathy:     She has no cervical adenopathy. Neurological: She is alert. Psychiatric: She has a normal mood and affect. Nursing note and vitals reviewed. ASSESSMENT and PLAN  Diagnoses and all orders for this visit:    1. Body aches  -     AMB POC RAPID INFLUENZA TEST -pos influenza b   otc tylenol, nsaids, fluids   tamiflu x 5d       2. Cough  -     AMB POC RAPID INFLUENZA TEST   zpak  3. Fever, unspecified fever cause  -     AMB POC RAPID INFLUENZA TEST   Tylenol   To call or return if not improving-d/w pt and   Other orders  -     oseltamivir (TAMIFLU) 75 mg capsule; Take 1 Cap by mouth two (2) times a day for 5 days. -     azithromycin (ZITHROMAX) 250 mg tablet; Take 1 Tab by mouth See Admin Instructions for 5 days. Follow-up Disposition:  Return if symptoms worsen or fail to improve.

## 2018-02-09 NOTE — MR AVS SNAPSHOT
[address] [facility] 
[phone] Patient: Seema Brambila MRN: [id number] GYD:[birth year] Visit Information Date & Time Provider Department Dept. Phone Encounter #  
 2/9/2018  9:45 AM Jewel Barahona, [address] [place] [address],[address] [phone] [id number] Follow-up Instructions Return if symptoms worsen or fail to improve. Your Appointments 4/24/2018  8:15 AM  
ROUTINE CARE with Yina Buitrago, [address] [place] [address],[address] [place] [address]) Appt Note: 6 month f/u  
 [facility] [address] [facility]  
[phone]  
  
   
 [address] [address]  
  
    
 5/10/2018  9:45 AM  
6 MONTH with Marc Moreno MD  
[facility] [address] [place] [address]) Appt Note: Dr. Patti Braga [facility]  
[phone] [address] [place] [address] Upcoming Health Maintenance Date Due DTaP/Tdap/Td series (1 - Tdap) 4/18/1956 MEDICARE YEARLY EXAM 1/14/2018 FOOT EXAM Q1 4/19/2018 MICROALBUMIN Q1 4/19/2018 HEMOGLOBIN A1C Q6M 4/24/2018 EYE EXAM RETINAL OR DILATED Q1 5/9/2018 LIPID PANEL Q1 10/24/2018 GLAUCOMA SCREENING Q2Y 5/9/2019 Allergies as of 2/9/2018  Review Complete On: 2/9/2018 By: Jewel Barahona MD  
  
 Severity Noted Reaction Type Reactions Crestor [Rosuvastatin]  03/07/2012    Unknown (comments) Current Immunizations  Reviewed on 4/19/2017 Name Date Influenza Nasal Vaccine 9/20/2017 Influenza Vaccine 9/20/2017,  Deferred (Patient Refused) Pneumococcal Conjugate (PCV-13) 4/28/2016 Pneumococcal Polysaccharide (PPSV-23) 11/18/2014,  Deferred (Patient Refused) Not reviewed this visit You Were Diagnosed With   
  
 Codes Comments Body aches    -  Primary ICD-10-CM: O03 ICD-9-CM: 780.96 Cough     ICD-10-CM: R05 ICD-9-CM: 786.2 Fever, unspecified fever cause     ICD-10-CM: R50.9 ICD-9-CM: 780.60 Vitals BP Pulse Temp Height(growth percentile) Weight(growth percentile) SpO2  
 137/59 (BP 1 Location: Left arm, BP Patient Position: Sitting) 77 100 °F (37.8 °C) (Oral) 5' 6\" (1.676 m) 167 lb (75.8 kg) 98% BMI OB Status Smoking Status 26.95 kg/m2 Postmenopausal Never Smoker BMI and BSA Data Body Mass Index Body Surface Area  
 26.95 kg/m 2 1.88 m 2 Preferred Pharmacy Pharmacy Name Phone [address]  [place] [phone] Your Updated Medication List  
  
   
This list is accurate as of: 2/9/18 10:11 AM.  Always use your most recent med list. amLODIPine-benazepril 5-20 mg per capsule Commonly known as:  LOTREL  
TAKE ONE CAPSULE BY MOUTH EVERY DAY  
  
 aspirin, buffered 81 mg Tab Take  by mouth. atorvastatin 20 mg tablet Commonly known as:  LIPITOR  
TAKE 1 TABLET BY MOUTH EVERY NIGHT AT BEDTIME  
  
 azithromycin 250 mg tablet Commonly known as:  Donivan Guile Take 1 Tab by mouth See Admin Instructions for 5 days. chlorthalidone 25 mg tablet Commonly known as:  HYGROTEN  
TAKE 1 TABLET BY MOUTH DAILY. clopidogrel 75 mg Tab Commonly known as:  PLAVIX TAKE 1 TABLET BY MOUTH EVERY DAY  
  
 metoprolol tartrate 50 mg tablet Commonly known as:  LOPRESSOR  
TAKE 1 TABLET BY MOUTH TWICE DAILY  
  
 oseltamivir 75 mg capsule Commonly known as:  TAMIFLU Take 1 Cap by mouth two (2) times a day for 5 days. Prescriptions Sent to Pharmacy Refills  
 oseltamivir (TAMIFLU) 75 mg capsule 0 Sig: Take 1 Cap by mouth two (2) times a day for 5 days. Class: Normal  
 Pharmacy: [facility] [address] #: [phone]  Route: Oral  
 azithromycin (ZITHROMAX) 250 mg tablet 0 Sig: Take 1 Tab by mouth See Admin Instructions for 5 days. Class: Normal  
 Pharmacy: [facility] [address] Saleem Funez [address] [place] [address] #: [phone] Route: Oral  
  
We Performed the Following AMB POC RAPID INFLUENZA TEST [00815 CPT(R)] Follow-up Instructions Return if symptoms worsen or fail to improve. Introducing [facility] & HEALTH SERVICES! [facility] [facility] introduces [employer] patient portal. Now you can access parts of your medical record, email your doctor's office, and request medication refills online. 1. In your internet browser, go to [email or web address] 2. Click on the First Time User? Click Here link in the Sign In box. You will see the New Member Sign Up page. 3. Enter your [employer] Access Code exactly as it appears below. You will not need to use this code after youve completed the sign-up process. If you do not sign up before the expiration date, you must request a new code. · [employer] Access Code: [email or web address] Expires: 5/10/2018 10:11 AM 
 
4. Enter the last four digits of your Social Security Number (xxxx) and Date of Birth (mm/dd/yyyy) as indicated and click Submit. You will be taken to the next sign-up page. 5. Create a [employer] ID. This will be your [employer] login ID and cannot be changed, so think of one that is secure and easy to remember. 6. Create a [employer] password. You can change your password at any time. 7. Enter your Password Reset Question and Answer. This can be used at a later time if you forget your password. 8. Enter your e-mail address. You will receive e-mail notification when new information is available in [address] 9. Click Sign Up. You can now view and download portions of your medical record. 10. Click the Download Summary menu link to download a portable copy of your medical information. If you have questions, please visit the Frequently Asked Questions section of the [employer] website. Remember, [employer] is NOT to be used for urgent needs. For medical emergencies, dial 911. Now available from your iPhone and Android! Please provide this summary of care documentation to your next provider. Your primary care clinician is listed as James Bell. If you have any questions after today's visit, please call [phone].

## 2018-04-23 ENCOUNTER — DOCUMENTATION ONLY (OUTPATIENT)
Dept: INTERNAL MEDICINE CLINIC | Age: 83
End: 2018-04-23

## 2018-04-23 NOTE — PROGRESS NOTES
Medicare Part B Preventive Services Limitations Recommendation Scheduled   Bone Mass Measurement  (age 72 & older, biennial) Requires diagnosis related to osteoporosis or estrogen deficiency. Biennial benefit unless patient has history of long-term glucocorticoid tx or baseline is needed because initial test was by other method Never completed     Recommended every 2 years Declines   Cardiovascular Screening Blood Tests (every 5 years)  Total cholesterol, HDL, Triglycerides Order as a panel if possible Completed 10/2017    Recommended annually Due 10/2018   Colorectal Cancer Screening  -Fecal occult blood test (annual)  -Flexible sigmoidoscopy (5y)  -Screening colonoscopy (10y)  -Barium Enema    Never completed  Declines   Counseling to Prevent Tobacco Use (up to 8 sessions per year)  - Counseling greater than 3 and up to 10 minutes  - Counseling greater than 10 minutes Patients must be asymptomatic of tobacco-related conditions to receive as preventive service N/A N/A   Diabetes Screening Tests (at least every 3 years, Medicare covers annually or at 6-month intervals for prediabetic patients)      Fasting blood sugar (FBS) or glucose tolerance test (GTT) Patient must be diagnosed with one of the following:  -Hypertension, Dyslipidemia, obesity, previous impaired FBS or GTT  Or any two of the following: overweight, FH of diabetes, age ? 72, history of gestational diabetes, birth of baby weighing more than 9 pounds Completed 10/2017 with A1C 5.8    Recommended every 3-6 months for pre-diabetes/diabetes Due 4/2018   Diabetes Self-Management Training (DSMT) (no USPSTF recommendation) Requires referral by treating physician for patient with diabetes or renal disease. 10 hours of initial DSMT session of no less than 30 minutes each in a continuous 12-month period. 2 hours of follow-up DSMT in subsequent years.  N/A N/A   Glaucoma Screening (no USPSTF recommendation) Diabetes mellitus, family history, , age 48 or over,  American, age 72 or over Completed 4/2017    Recommended annually Due 4/2018   Human Immunodeficiency Virus (HIV) Screening (annually for increased risk patients)  HIV-1 and HIV-2 by EIA, LEWIS, rapid antibody test, or oral mucosa transudate Patient must be at increased risk for HIV infection per USPSTF guidelines or pregnant. Tests covered annually for patients at increased risk. Pregnant patients may receive up to 3 test during pregnancy. N/A N/A   Medical Nutrition Therapy (MNT) (for diabetes or renal disease not recommended schedule) Requires referral by treating physician for patient with diabetes or renal disease. Can be provided in same year as diabetes self-management training (DSMT), and CMS recommends medical nutrition therapy take place after DSMT. Up to 3 hours for initial year and 2 hours in subsequent years. N/A N/A   Prostate Cancer Screening (annually up to age 76)  - Digital rectal exam (SEAN)  - Prostate specific antigen (PSA) Annually (age 48 or over), SEAN not paid separately when covered E/M service is provided on same date N/A N/A   Seasonal Influenza Vaccination (annually)    Completed Fall 2017    Recommended annually Due Fall 2018   Pneumococcal Vaccination (once after 72)    Pneumovax - 2014    Prevnar 13 - 2016    Both recommended once over the age of 72 Completed     Completed    Hepatitis B Vaccinations (if medium/high risk) Medium/high risk factors: End-stage renal disease,  Hemophiliacs who received Factor VIII or IX concentrates, Clients of institutions for the mentally retarded, Persons who live in the same house as a HepB virus carrier, Homosexual men, Illicit injectable drug abusers. N/A N/A   Screening Mammography (biennial age 54-69)?  Annually (age 36 or over) Never completed Declines   Screening Pap Tests and Pelvic Examination (up to age 79 and after 79 if unknown history or abnormal study last 10 years) Every 24 months except high risk Completed 9/2010    Recommended every 2 years Declines   Ultrasound Screening for Abdominal Aortic Aneurysm (AAA) (once) Patient must be referred through IPPE and not have had a screening for abdominal aortic aneurysm before under Medicare.  Limited to patients who meet one of the following criteria:  - Men who are 73-68 years old and have smoked more than 100 cigarettes in their lifetime.  -Anyone with a FH of AAA  -Anyone recommended for screening by USPSTF N/A N/A

## 2018-04-24 ENCOUNTER — HOSPITAL ENCOUNTER (OUTPATIENT)
Dept: LAB | Age: 83
Discharge: HOME | End: 2018-04-24
Payer: MEDICARE

## 2018-04-24 ENCOUNTER — OFFICE VISIT (OUTPATIENT)
Dept: INTERNAL MEDICINE CLINIC | Age: 83
End: 2018-04-24

## 2018-04-24 VITALS
TEMPERATURE: 97.4 F | RESPIRATION RATE: 16 BRPM | HEIGHT: 66 IN | WEIGHT: 170 LBS | DIASTOLIC BLOOD PRESSURE: 67 MMHG | BODY MASS INDEX: 27.32 KG/M2 | HEART RATE: 63 BPM | OXYGEN SATURATION: 96 % | SYSTOLIC BLOOD PRESSURE: 125 MMHG

## 2018-04-24 DIAGNOSIS — I25.83 CORONARY ARTERY DISEASE DUE TO LIPID RICH PLAQUE: ICD-10-CM

## 2018-04-24 DIAGNOSIS — I25.10 CORONARY ARTERY DISEASE DUE TO LIPID RICH PLAQUE: ICD-10-CM

## 2018-04-24 DIAGNOSIS — E66.3 OVERWEIGHT: ICD-10-CM

## 2018-04-24 DIAGNOSIS — E11.9 DIABETES MELLITUS WITHOUT COMPLICATION (HCC): ICD-10-CM

## 2018-04-24 DIAGNOSIS — I10 ESSENTIAL HYPERTENSION, BENIGN: Primary | ICD-10-CM

## 2018-04-24 DIAGNOSIS — R73.01 IFG (IMPAIRED FASTING GLUCOSE): ICD-10-CM

## 2018-04-24 DIAGNOSIS — Z00.00 MEDICARE ANNUAL WELLNESS VISIT, SUBSEQUENT: ICD-10-CM

## 2018-04-24 DIAGNOSIS — E78.2 MIXED HYPERLIPIDEMIA: ICD-10-CM

## 2018-04-24 PROCEDURE — 85027 COMPLETE CBC AUTOMATED: CPT

## 2018-04-24 PROCEDURE — 82043 UR ALBUMIN QUANTITATIVE: CPT

## 2018-04-24 PROCEDURE — 84443 ASSAY THYROID STIM HORMONE: CPT

## 2018-04-24 PROCEDURE — 80053 COMPREHEN METABOLIC PANEL: CPT

## 2018-04-24 PROCEDURE — 83036 HEMOGLOBIN GLYCOSYLATED A1C: CPT

## 2018-04-24 PROCEDURE — 36415 COLL VENOUS BLD VENIPUNCTURE: CPT

## 2018-04-24 NOTE — MR AVS SNAPSHOT
[address] [facility] [address] [place] [facility] 
[phone] Patient: Aylin Terrazas MRN: [id number] Visit Information Date & Time Provider Department Dept. Phone Encounter #  
 4/24/2018  8:15 AM Janiya Hollins, [address] [place] [address],[address] [phone] [id number] Follow-up Instructions Return in about 6 months (around 10/24/2018). Your Appointments 5/10/2018  9:45 AM  
6 MONTH with Janelle Crawford MD  
[facility]) Appt Note: Dr. C[facility]  
[phone] [address] [facility] Upcoming Health Maintenance Date Due DTaP/Tdap/Td series (1 - Tdap) 4/18/1956 MEDICARE YEARLY EXAM 3/14/2018 FOOT EXAM Q1 4/19/2018 MICROALBUMIN Q1 4/19/2018 HEMOGLOBIN A1C Q6M 4/24/2018 EYE EXAM RETINAL OR DILATED Q1 5/9/2018 LIPID PANEL Q1 10/24/2018 GLAUCOMA SCREENING Q2Y 5/9/2019 Allergies as of 4/24/2018  Review Complete On: 4/24/2018 By: Janiya Hollins MD  
  
 Severity Noted Reaction Type Reactions Crestor [Rosuvastatin]  03/07/2012    Unknown (comments) Current Immunizations  Reviewed on 4/19/2017 Name Date Influenza Nasal Vaccine 9/20/2017 Influenza Vaccine 9/20/2017,  Deferred (Patient Refused) Pneumococcal Conjugate (PCV-13) 4/28/2016 Pneumococcal Polysaccharide (PPSV-23) 11/18/2014,  Deferred (Patient Refused) Not reviewed this visit You Were Diagnosed With   
  
 Codes Comments Essential hypertension, benign    -  Primary ICD-10-CM: I10 
ICD-9-CM: 401.1 Medicare annual wellness visit, subsequent     ICD-10-CM: Z00.00 ICD-9-CM: V70.0 Mixed hyperlipidemia     ICD-10-CM: E78.2 ICD-9-CM: 272.2 Diabetes mellitus without complication ([facility]     BIS-73-MZ: E11.9 ICD-9-CM: 250.00 Coronary artery disease due to lipid rich plaque     ICD-10-CM: I25.10, I25.83 ICD-9-CM: 414.00, 414.3 IFG (impaired fasting glucose)     ICD-10-CM: R73.01 
ICD-9-CM: 790.21 Overweight     ICD-10-CM: M69.0 ICD-9-CM: 278.02 Vitals BP Pulse Temp Resp Height(growth percentile) Weight(growth percentile) 125/67 (BP 1 Location: Left arm, BP Patient Position: Sitting) 63 97.4 °F (36.3 °C) (Oral) 16 5' 6\" (1.676 m) 170 lb (77.1 kg) SpO2 BMI OB Status Smoking Status 96% 27.44 kg/m2 Postmenopausal Never Smoker Vitals History BMI and BSA Data Body Mass Index Body Surface Area  
 27.44 kg/m 2 1.89 m 2 Preferred Pharmacy Pharmacy Name Phone [facility] [address] [facility] [address]  [place] [phone] Your Updated Medication List  
  
   
This list is accurate as of 4/24/18  8:20 AM.  Always use your most recent med list. amLODIPine-benazepril 5-20 mg per capsule Commonly known as:  LOTREL  
TAKE ONE CAPSULE BY MOUTH EVERY DAY  
  
 aspirin, buffered 81 mg Tab Take  by mouth. atorvastatin 20 mg tablet Commonly known as:  LIPITOR  
TAKE 1 TABLET BY MOUTH EVERY NIGHT AT BEDTIME  
  
 chlorthalidone 25 mg tablet Commonly known as:  HYGROTEN  
TAKE 1 TABLET BY MOUTH DAILY. clopidogrel 75 mg Tab Commonly known as:  PLAVIX TAKE 1 TABLET BY MOUTH EVERY DAY  
  
 metoprolol tartrate 50 mg tablet Commonly known as:  LOPRESSOR  
TAKE 1 TABLET BY MOUTH TWICE DAILY We Performed the Following CBC W/O DIFF [85637 CPT(R)] HEMOGLOBIN A1C WITH EAG [52738 CPT(R)]  DIABETES FOOT EXAM [7 Custom] METABOLIC PANEL, COMPREHENSIVE [54872 CPT(R)] MICROALBUMIN, UR, RAND W/ MICROALB/CREAT RATIO [id number] CPT(R)] TSH 3RD GENERATION [76423 CPT(R)] Follow-up Instructions Return in about 6 months (around 10/24/2018). Patient Instructions Medicare Wellness Visit, Female The best way to live healthy is to have a healthy lifestyle by eating a well-balanced diet, exercising regularly, limiting alcohol and stopping smoking. Regular physical exams and screening tests are another way to keep healthy. Preventive exams provided by your health care provider can find health problems before they become diseases or illnesses. Preventive services including immunizations, screening tests, monitoring and exams can help you take care of your own health. All people over age 72 should have a pneumovax  and and a prevnar shot to prevent pneumonia. These are once in a lifetime unless you and your provider decide differently. All people over 65 should have a yearly flu shot and a tetanus vaccine every 10 years. A bone mass density to screen for osteoporosis or thinning of the bones should be done every 2 years after 65. Screening for diabetes mellitus with a blood sugar test should be done every year. Glaucoma is a disease of the eye due to increased ocular pressure that can lead to blindness and it should be done every year by an eye professional. 
 
Cardiovascular screening tests that check for elevated lipids (fatty part of blood) which can lead to heart disease and strokes should be done every 5 years. Colorectal screening that evaluates for blood or polyps in your colon should be done yearly as a stool test or every five years as a flexible sigmoidoscope or every 10 years as a colonoscopy up to age 76. Breast cancer screening with a mammogram is recommended biennially  for women age 54-69. Screening for cervical cancer with a pap smear and pelvic exam is recommended for women after age 72 years every 2 years up to age 79 or when the provider and patient decide to stop. If there is a history of cervical abnormalities or other increased risk for cancer then the test is recommended yearly. Hepatitis C screening is also recommended for anyone born between 80 through [address]. A shingles vaccine is also recommended once in a lifetime after age 61. Your Medicare Wellness Exam is recommended annually. Here is a list of your current Health Maintenance items with a due date: 
Health Maintenance Due Topic Date Due  
 DTaP/Tdap/Td  (1 - Tdap) 04/18/1956 [facility] Annual Well Visit  03/14/2018 [address] [place] [address] Diabetic Foot Care  04/19/2018  Albumin Urine Test  04/19/2018  Hemoglobin A1C    04/24/2018 [address] [place] [address] Eye Exam  05/09/2018 Medicare Part B Preventive Services Limitations Recommendation Scheduled Bone Mass Measurement 
(age 72 & older, biennial) Requires diagnosis related to osteoporosis or estrogen deficiency. Biennial benefit unless patient has history of long-term glucocorticoid tx or baseline is needed because initial test was by other method Never completed  
  
Recommended every 2 years Declines Cardiovascular Screening Blood Tests (every 5 years) Total cholesterol, HDL, Triglycerides Order as a panel if possible Completed 10/2017 
  
Recommended annually Due 10/2018 Colorectal Cancer Screening 
-Fecal occult blood test (annual) -Flexible sigmoidoscopy (5y) 
-Screening colonoscopy (10y) -Barium Enema    Never completed  Declines Counseling to Prevent Tobacco Use (up to 8 sessions per year) - Counseling greater than 3 and up to 10 minutes - Counseling greater than 10 minutes Patients must be asymptomatic of tobacco-related conditions to receive as preventive service N/A N/A Diabetes Screening Tests (at least every 3 years, Medicare covers annually or at 6-month intervals for prediabetic patients) 
   
Fasting blood sugar (FBS) or glucose tolerance test (GTT) Patient must be diagnosed with one of the following: 
-Hypertension, Dyslipidemia, obesity, previous impaired FBS or GTT 
Or any two of the following: overweight, FH of diabetes, age ? 65, history of gestational diabetes, birth of baby weighing more than 9 pounds Completed 10/2017 with A1C 5.8 Recommended every 3-6 months for pre-diabetes/diabetes Due 4/2018 Diabetes Self-Management Training (DSMT) (no USPSTF recommendation) Requires referral by treating physician for patient with diabetes or renal disease. 10 hours of initial DSMT session of no less than 30 minutes each in a continuous 12-month period. 2 hours of follow-up DSMT in subsequent years. N/A N/A Glaucoma Screening (no USPSTF recommendation) Diabetes mellitus, family history, , age 48 or over,  American, age 72 or over Completed 4/2017 
  
Recommended annually Due 4/2018 Human Immunodeficiency Virus (HIV) Screening (annually for increased risk patients) HIV-1 and HIV-2 by EIA, LEWIS, rapid antibody test, or oral mucosa transudate Patient must be at increased risk for HIV infection per USPSTF guidelines or pregnant. Tests covered annually for patients at increased risk. Pregnant patients may receive up to 3 test during pregnancy. N/A N/A Medical Nutrition Therapy (MNT) (for diabetes or renal disease not recommended schedule) Requires referral by treating physician for patient with diabetes or renal disease. Can be provided in same year as diabetes self-management training (DSMT), and CMS recommends medical nutrition therapy take place after DSMT. Up to 3 hours for initial year and 2 hours in subsequent years. N/A N/A Prostate Cancer Screening (annually up to age 76) - Digital rectal exam (SEAN) - Prostate specific antigen (PSA) Annually (age 48 or over), SEAN not paid separately when covered E/M service is provided on same date N/A N/A Seasonal Influenza Vaccination (annually)    Completed Fall 2017 
  
Recommended annually Due Fall 2018 Pneumococcal Vaccination (once after 65)    Pneumovax - 2014 
  
Prevnar 13 - 2016 
  
Both recommended once over the age of 72 Completed 
   
Completed Hepatitis B Vaccinations (if medium/high risk) Medium/high risk factors: End-stage renal disease, Hemophiliacs who received Factor VIII or IX concentrates, Clients of institutions for the mentally retarded, Persons who live in the same house as a HepB virus carrier, Homosexual men, Illicit injectable drug abusers. N/A N/A Screening Mammography (biennial age 54-69)? Annually (age 36 or over) Never completed Declines Screening Pap Tests and Pelvic Examination (up to age 79 and after 79 if unknown history or abnormal study last 10 years) Every 24 months except high risk Completed 9/2010 
  
Recommended every 2 years Declines Ultrasound Screening for Abdominal Aortic Aneurysm (AAA) (once) Patient must be referred through IPPE and not have had a screening for abdominal aortic aneurysm before under Medicare. Limited to patients who meet one of the following criteria: 
- Men who are 73-68 years old and have smoked more than 100 cigarettes in their lifetime. 
-Anyone with a FH of AAA 
-Anyone recommended for screening by USPSTF N/A N/A  
  
  
Please bring in a copy of your advanced directive to your next office visit so we can have a copy on file. Introducing [facility]! Liliam Harding introduces i-marker patient portal. Now you can access parts of your medical record, email your doctor's office, and request medication refills online. 1. In your internet browser, go to [email or web address] 2. Click on the First Time User? Click Here link in the Sign In box. You will see the New Member Sign Up page. 3. Enter your i-marker Access Code exactly as it appears below. You will not need to use this code after youve completed the sign-up process. If you do not sign up before the expiration date, you must request a new code. · i-marker Access Code: [email or web address] Expires: 5/10/2018 11:11 AM 
 
4.  Enter the last four digits of your Social Security Number (xxxx) and Date of Birth (mm/dd/yyyy) as indicated and click Submit. You will be taken to the next sign-up page. 5. Create a LiveHive ID. This will be your LiveHive login ID and cannot be changed, so think of one that is secure and easy to remember. 6. Create a LiveHive password. You can change your password at any time. 7. Enter your Password Reset Question and Answer. This can be used at a later time if you forget your password. 8. Enter your e-mail address. You will receive e-mail notification when new information is available in [address] 9. Click Sign Up. You can now view and download portions of your medical record. 10. Click the Download Summary menu link to download a portable copy of your medical information. If you have questions, please visit the Frequently Asked Questions section of the LiveHive website. Remember, LiveHive is NOT to be used for urgent needs. For medical emergencies, dial 911. Now available from your iPhone and Android! Please provide this summary of care documentation to your next provider. Your primary care clinician is listed as Myrtle Reasoner. If you have any questions after today's visit, please call [phone].

## 2018-04-24 NOTE — PROGRESS NOTES
HISTORY OF PRESENT ILLNESS  Delta Javed is a 82 y.o. female. HPI   Last here 10/24/17. Pt is here to f/u on chronic conditions. Pt brought in all of her pill bottles - reviewed.      BP is 150/71, will repeat this today   BP was elevated this AM at home  Reports normal bp readings at home  She is excited running in here this am causing bp to be up a little   Lov, I restarted her lotrel 5-20mg daily - reports compliance   Continues on chlorthalidone 25mg daily and metoprolol 50mg BID  She usually takes these BP meds at noon     Wt is stable since last visit   Pt states that she gained 5 lbs recently   Pt stopped exercising d/t cold weather  Pt will exercise more in the warmer weather   Discussed diet and w/l     Reviewed labs 10/17: low K+  Will get labs today     Pt saw Dr. Marissa Bruno (PCP) on 2/9/18  Pt tested positive for influenza  Had cough and congestion   Pt was provided with tamiflu  Her sx have since resolved       Pt follows with Dr. Vilma Lipscomb (cardio)   Reviewed last notes 11/2/17: Patient presents for follow up, feeling well and stable from cardiac standpoint. CAD: Doing well with no cardiac symptoms.  Continue current care including plavix for h/o Cypher stents and f/u in 6 months. Lipids at goal 10/17. HTN: Normotensive, EKG NSR. Mild hypokalemia: She states she is now eating more foods that are high in potassium and has plans to recheck her basic metabolic panel in 1 month with Dr. Emili Andrews. If persistent, will likely need to add some potassium as she is on chlorthalidone. Counseled on diet and exercise- eventual goal of 30-60 minutes 5-7 times a week as per AHA guidelines. Exercising regularly.     Continues on plavix 75mg daily    She also takes ASA 81mg daily  Next visit scheduled for 5/18    Pt previously followed with Dr. Kenny Allred (derm) for eczema  Pt avoids certain foods and drinks tart cranberry juice with improvement to sx  Pt will only f/u with this physician prn   No recent flares or itching      Continues on lipitor 20mg daily for cholesterol - at goal in october  Recall could not tolerate crestor    Pt is independent (pt can drive/feed/bathe etc. Herself)  Her  vacuums from time to time    Pt lives and gets along well with her     Pt tripped and fell down the stairs ~2 months ago  Pt did not injury herself at that time  Normally walks well       ACP not on file. SDMs are her  Ciro Zavala), son and daughter-in-law.   Provided information today.      PREVENTIVE:    Colonoscopy: declines further  Pap: 9/2010, declines further  Mammogram: declines further  Dexa: declines further  Tdap: never completed  Pneumovax: 11/18/2014  [id number]: 4/28/2016  Shingrix: declines  Flu shot: Fall 2017 at [address] exam: 04/24/18   Microalbumin: 4/17, no protein in the urine  A1c: 10/12 5.6, 4/13 6.3, 9/13 5.9, 1/14 5.6, 7/14 5.3, 11/14 5.5, 9/15 5.9, 1/16 6.1, 7/16 6.1, 1/17 5.9, 4/17 6.0, 10/17 5.8  Eye exam: Dr. Gasper Motta, 4/17, due 4/18  Lipids: 10/17 LDL 67    Patient Active Problem List    Diagnosis Date Noted    Diabetes mellitus without complication ([facility] [address] 77/32/4345    Left carotid bruit 01/16/2015    Closed bimalleolar fracture of right ankle 10/18/2013    Gout 06/11/2013    Mitral valve disorders(424.0) 05/08/2012    Mixed hyperlipidemia 03/07/2012    Essential hypertension, benign 03/07/2012    Postsurgical percutaneous transluminal coronary angioplasty status 03/07/2012    S/P Stent LAD (LISSA) 03/07/2012    Coronary atherosclerosis of native coronary artery 03/07/2012    Borderline diabetes 03/07/2012    Hypertension 09/16/2009    Hyperlipidemia 09/16/2009    Diabetes ([facility] [address] 09/16/2009    CAD (coronary artery disease) 09/16/2009     Current Outpatient Prescriptions   Medication Sig Dispense Refill    atorvastatin (LIPITOR) 20 mg tablet TAKE 1 TABLET BY MOUTH EVERY NIGHT AT BEDTIME 90 Tab 3    metoprolol tartrate (LOPRESSOR) 50 mg tablet TAKE 1 TABLET BY MOUTH TWICE DAILY 180 Tab 3    chlorthalidone (HYGROTEN) 25 mg tablet TAKE 1 TABLET BY MOUTH DAILY. 90 Tab 3    amLODIPine-benazepril (LOTREL) 5-20 mg per capsule TAKE ONE CAPSULE BY MOUTH EVERY DAY 90 Cap 3    clopidogrel (PLAVIX) 75 mg tab TAKE 1 TABLET BY MOUTH EVERY DAY 90 Tab 3    Aspirin, Buffered 81 mg tab Take  by mouth. Past Surgical History:   Procedure Laterality Date    CARDIAC SURG PROCEDURE UNLIST  2008    cardiac stent    HX ORTHOPAEDIC  10/2013    right ankle    HX ORTHOPAEDIC  2/20/14    INCISION AND DRAINAGE RIGHT ANKLE AND HARDWARE REMOVAL      Lab Results  Component Value Date/Time   WBC 8.3 04/19/2017 09:04 AM   HGB 12.3 04/19/2017 09:04 AM   HCT 37.4 04/19/2017 09:04 AM   PLATELET 952 97/96/7328 09:04 AM   MCV 92 04/19/2017 09:04 AM     Lab Results  Component Value Date/Time   Cholesterol, total 146 10/24/2017 08:26 AM   HDL Cholesterol 56 10/24/2017 08:26 AM   LDL, calculated 67 10/24/2017 08:26 AM   Triglyceride 115 10/24/2017 08:26 AM   CHOL/HDL Ratio 2.6 09/15/2010 07:53 AM     Lab Results  Component Value Date/Time   GFR est non-AA 72 12/05/2017 10:13 AM   GFR est AA 83 12/05/2017 10:13 AM   Creatinine 0.78 12/05/2017 10:13 AM   BUN 12 12/05/2017 10:13 AM   Sodium 140 12/05/2017 10:13 AM   Potassium 4.0 12/05/2017 10:13 AM   Chloride 94 (L) 12/05/2017 10:13 AM   CO2 32 (H) 12/05/2017 10:13 AM        Review of Systems   Respiratory: Negative for shortness of breath. Cardiovascular: Negative for chest pain. Musculoskeletal: Positive for falls. Psychiatric/Behavioral: Negative for depression and memory loss. Physical Exam   Constitutional: She is oriented to person, place, and time. She appears well-developed and well-nourished. No distress. HENT:   Head: Normocephalic and atraumatic. Right Ear: External ear normal.   Left Ear: External ear normal.   Mouth/Throat: Oropharynx is clear and moist. No oropharyngeal exudate.    Mild cerumen BL TM   Eyes: Conjunctivae and EOM are normal. Right eye exhibits no discharge. Left eye exhibits no discharge. Neck: Normal range of motion. Neck supple. No carotid bruits    Cardiovascular: Normal rate, regular rhythm and normal heart sounds. Exam reveals no gallop and no friction rub. No murmur heard. Pulmonary/Chest: Effort normal and breath sounds normal. No respiratory distress. She has no wheezes. She has no rales. She exhibits no tenderness. Abdominal: Soft. She exhibits no distension and no mass. There is no tenderness. There is no rebound and no guarding. Musculoskeletal: Normal range of motion. She exhibits no edema, tenderness or deformity. Lymphadenopathy:     She has no cervical adenopathy. Neurological: She is alert and oriented to person, place, and time. Coordination normal.   Monofilament nl BLE, good peripheral pulses, no ulcers, hammertoes and bunions BLE   Skin: Skin is warm and dry. No rash noted. She is not diaphoretic. No erythema. No pallor. Psychiatric: She has a normal mood and affect. Her behavior is normal.       ASSESSMENT and PLAN    ICD-10-CM ICD-9-CM    1. Essential hypertension, benign    Initial BP elevated, repeat BP improved, generally well-controled at ov, continue chlorthalidone, lotrel and metoprolol   L35 978.1 METABOLIC PANEL, COMPREHENSIVE      HEMOGLOBIN A1C WITH EAG      TSH 3RD GENERATION      CBC W/O DIFF      MICROALBUMIN, UR, RAND W/ MICROALB/CREAT RATIO   2. Medicare annual wellness visit, subsequent T39.30 X34.8 METABOLIC PANEL, COMPREHENSIVE      HEMOGLOBIN A1C WITH EAG      TSH 3RD GENERATION      CBC W/O DIFF      MICROALBUMIN, UR, RAND W/ MICROALB/CREAT RATIO   3. Mixed hyperlipidemia    Controled on lipitor    W38.0 646.4 METABOLIC PANEL, COMPREHENSIVE      HEMOGLOBIN A1C WITH EAG      TSH 3RD GENERATION      CBC W/O DIFF      MICROALBUMIN, UR, RAND W/ MICROALB/CREAT RATIO   4.  Diabetes mellitus without complication ([facility] [address]    Pt really more with pre-diabetes, a1cs have been nl, diet-controled, due for eye exam, will check labs today   C43.0 016.08 METABOLIC PANEL, COMPREHENSIVE      HEMOGLOBIN A1C WITH EAG      TSH 3RD GENERATION      CBC W/O DIFF      MICROALBUMIN, UR, RAND W/ MICROALB/CREAT RATIO       DIABETES FOOT EXAM   5. Coronary artery disease due to lipid rich plaque    UTD with Dr. Brittney Dow, stable, continues on plavix, no active signs or sx of CAD   X65.11 908.25 METABOLIC PANEL, COMPREHENSIVE    I25.83 414.3 HEMOGLOBIN A1C WITH EAG      TSH 3RD GENERATION      CBC W/O DIFF      MICROALBUMIN, UR, RAND W/ MICROALB/CREAT RATIO   6. IFG (impaired fasting glucose)    See above   Q64.52 289.38 METABOLIC PANEL, COMPREHENSIVE      HEMOGLOBIN A1C WITH EAG      TSH 3RD GENERATION      CBC W/O DIFF      MICROALBUMIN, UR, RAND W/ MICROALB/CREAT RATIO   7. Overweight    Discussed diet and w/l, plans to start exercising more and goal is lose 5 lbs in the next 6 months   E41.7 512.45 METABOLIC PANEL, COMPREHENSIVE      HEMOGLOBIN A1C WITH EAG      TSH 3RD GENERATION      CBC W/O DIFF      MICROALBUMIN, UR, RAND W/ MICROALB/CREAT RATIO        Depression screen reviewed and negative. Scribed by Kaci Wilcox of [facility], as dictated by Dr. Juliano Mejia. Current diagnosis and concerns discussed with pt at length. Pt understands risks and benefits or current treatment plan and medications, and accepts the treatment and medication with any possible risks. Pt asks appropriate questions, which were answered. Pt was instructed to call with any concerns or problems. This note will not be viewable in [address]

## 2018-04-24 NOTE — PATIENT INSTRUCTIONS
Medicare Wellness Visit, Female    The best way to live healthy is to have a healthy lifestyle by eating a well-balanced diet, exercising regularly, limiting alcohol and stopping smoking. Regular physical exams and screening tests are another way to keep healthy. Preventive exams provided by your health care provider can find health problems before they become diseases or illnesses. Preventive services including immunizations, screening tests, monitoring and exams can help you take care of your own health. All people over age 72 should have a pneumovax  and and a prevnar shot to prevent pneumonia. These are once in a lifetime unless you and your provider decide differently. All people over 65 should have a yearly flu shot and a tetanus vaccine every 10 years. A bone mass density to screen for osteoporosis or thinning of the bones should be done every 2 years after 65. Screening for diabetes mellitus with a blood sugar test should be done every year. Glaucoma is a disease of the eye due to increased ocular pressure that can lead to blindness and it should be done every year by an eye professional.    Cardiovascular screening tests that check for elevated lipids (fatty part of blood) which can lead to heart disease and strokes should be done every 5 years. Colorectal screening that evaluates for blood or polyps in your colon should be done yearly as a stool test or every five years as a flexible sigmoidoscope or every 10 years as a colonoscopy up to age 76. Breast cancer screening with a mammogram is recommended biennially  for women age 54-69. Screening for cervical cancer with a pap smear and pelvic exam is recommended for women after age 72 years every 2 years up to age 79 or when the provider and patient decide to stop. If there is a history of cervical abnormalities or other increased risk for cancer then the test is recommended yearly.     Hepatitis C screening is also recommended for anyone born between [address] [place] [address] through [place] [address]. A shingles vaccine is also recommended once in a lifetime after age 61. Your Medicare Wellness Exam is recommended annually. Here is a list of your current Health Maintenance items with a due date:  Health Maintenance Due   Topic Date Due    DTaP/Tdap/Td  (1 - Tdap) 04/18/1958    Annual Well Visit  03/14/2018    Diabetic Foot Care  04/19/2018    Albumin Urine Test  04/19/2018    Hemoglobin A1C    04/24/2018    Eye Exam  05/09/2018     Medicare Part B Preventive Services Limitations Recommendation Scheduled   Bone Mass Measurement  (age 72 & older, biennial) Requires diagnosis related to osteoporosis or estrogen deficiency. Biennial benefit unless patient has history of long-term glucocorticoid tx or baseline is needed because initial test was by other method Never completed      Recommended every 2 years Declines   Cardiovascular Screening Blood Tests (every 5 years)  Total cholesterol, HDL, Triglycerides Order as a panel if possible Completed 10/2017     Recommended annually Due 10/2018   Colorectal Cancer Screening  -Fecal occult blood test (annual)  -Flexible sigmoidoscopy (5y)  -Screening colonoscopy (10y)  -Barium Enema    Never completed  Declines   Counseling to Prevent Tobacco Use (up to 8 sessions per year)  - Counseling greater than 3 and up to 10 minutes  - Counseling greater than 10 minutes Patients must be asymptomatic of tobacco-related conditions to receive as preventive service N/A N/A   Diabetes Screening Tests (at least every 3 years, Medicare covers annually or at 6-month intervals for prediabetic patients)      Fasting blood sugar (FBS) or glucose tolerance test (GTT) Patient must be diagnosed with one of the following:  -Hypertension, Dyslipidemia, obesity, previous impaired FBS or GTT  Or any two of the following: overweight, FH of diabetes, age ? 72, history of gestational diabetes, birth of baby weighing more than 9 pounds Completed 10/2017 with A1C 5.8   Recommended every 3-6 months for pre-diabetes/diabetes Due 4/2018   Diabetes Self-Management Training (DSMT) (no USPSTF recommendation) Requires referral by treating physician for patient with diabetes or renal disease. 10 hours of initial DSMT session of no less than 30 minutes each in a continuous 12-month period. 2 hours of follow-up DSMT in subsequent years. N/A N/A   Glaucoma Screening (no USPSTF recommendation) Diabetes mellitus, family history, , age 48 or over,  American, age 72 or over Completed 4/2017     Recommended annually Due 4/2018   Human Immunodeficiency Virus (HIV) Screening (annually for increased risk patients)  HIV-1 and HIV-2 by EIA, LEWIS, rapid antibody test, or oral mucosa transudate Patient must be at increased risk for HIV infection per USPSTF guidelines or pregnant. Tests covered annually for patients at increased risk. Pregnant patients may receive up to 3 test during pregnancy. N/A N/A   Medical Nutrition Therapy (MNT) (for diabetes or renal disease not recommended schedule) Requires referral by treating physician for patient with diabetes or renal disease. Can be provided in same year as diabetes self-management training (DSMT), and CMS recommends medical nutrition therapy take place after DSMT. Up to 3 hours for initial year and 2 hours in subsequent years.  N/A N/A   Prostate Cancer Screening (annually up to age 76)  - Digital rectal exam (SEAN)  - Prostate specific antigen (PSA) Annually (age 48 or over), SEAN not paid separately when covered E/M service is provided on same date N/A N/A   Seasonal Influenza Vaccination (annually)    Completed Fall 2017     Recommended annually Due Fall 2018   Pneumococcal Vaccination (once after 72)    Pneumovax - 2014     Prevnar 13 - 2016     Both recommended once over the age of 72 Completed      Completed    Hepatitis B Vaccinations (if medium/high risk) Medium/high risk factors: End-stage renal disease,  Hemophiliacs who received Factor VIII or IX concentrates, Clients of institutions for the mentally retarded, Persons who live in the same house as a HepB virus carrier, Homosexual men, Illicit injectable drug abusers. N/A N/A   Screening Mammography (biennial age 54-69)? Annually (age 36 or over) Never completed Declines   Screening Pap Tests and Pelvic Examination (up to age 79 and after 79 if unknown history or abnormal study last 10 years) Every 25 months except high risk Completed 9/2010     Recommended every 2 years Declines   Ultrasound Screening for Abdominal Aortic Aneurysm (AAA) (once) Patient must be referred through IPPE and not have had a screening for abdominal aortic aneurysm before under Medicare. Limited to patients who meet one of the following criteria:  - Men who are 73-68 years old and have smoked more than 100 cigarettes in their lifetime.  -Anyone with a FH of AAA  -Anyone recommended for screening by USPSTF N/A N/A         Please bring in a copy of your advanced directive to your next office visit so we can have a copy on file.

## 2018-04-24 NOTE — PROGRESS NOTES
This is the Subsequent Medicare Annual Wellness Exam, performed 12 months or more after the Initial AWV or the last Subsequent AWV    I have reviewed the patient's medical history in detail and updated the computerized patient record. History     Past Medical History:   Diagnosis Date    CAD (coronary artery disease) 9/16/2009    EF=65-70%; Mild-mod MR; Dr Van Hogue    Diabetes [facility]) 9/16/2009    boarder line controlle by diet and exercise.  Hyperlipidemia 9/16/2009    Hypertension 9/16/2009      Past Surgical History:   Procedure Laterality Date    CARDIAC SURG PROCEDURE UNLIST  2008    cardiac stent    HX ORTHOPAEDIC  10/2013    right ankle    HX ORTHOPAEDIC  2/20/14    INCISION AND DRAINAGE RIGHT ANKLE AND HARDWARE REMOVAL     Current Outpatient Prescriptions   Medication Sig Dispense Refill    atorvastatin (LIPITOR) 20 mg tablet TAKE 1 TABLET BY MOUTH EVERY NIGHT AT BEDTIME 90 Tab 3    metoprolol tartrate (LOPRESSOR) 50 mg tablet TAKE 1 TABLET BY MOUTH TWICE DAILY 180 Tab 3    chlorthalidone (HYGROTEN) 25 mg tablet TAKE 1 TABLET BY MOUTH DAILY. 90 Tab 3    amLODIPine-benazepril (LOTREL) 5-20 mg per capsule TAKE ONE CAPSULE BY MOUTH EVERY DAY 90 Cap 3    clopidogrel (PLAVIX) 75 mg tab TAKE 1 TABLET BY MOUTH EVERY DAY 90 Tab 3    Aspirin, Buffered 81 mg tab Take  by mouth.        Allergies   Allergen Reactions    Crestor [Rosuvastatin] Unknown (comments)     Family History   Problem Relation Age of Onset    Hypertension Mother     Heart Disease Mother     Hypertension Father     Heart Disease Father      Social History   Substance Use Topics    Smoking status: Never Smoker    Smokeless tobacco: Never Used    Alcohol use No     Patient Active Problem List   Diagnosis Code    Hypertension I10    Hyperlipidemia E78.5    Diabetes ([address] E11.9    CAD (coronary artery disease) I25.10    Mixed hyperlipidemia E78.2    Essential hypertension, benign I10    Postsurgical percutaneous transluminal coronary angioplasty status Z98.61    S/P Stent LAD (LISSA) Z95.9    Coronary atherosclerosis of native coronary artery I25.10    Borderline diabetes R73.03    Mitral valve disorders(424.0) I05.9    Gout M10.9    Closed bimalleolar fracture of right ankle S82.841A    Left carotid bruit R09.89    Diabetes mellitus without complication (HCC) D38.1       Depression Risk Factor Screening:     PHQ over the last two weeks 4/24/2018   Little interest or pleasure in doing things Not at all   Feeling down, depressed or hopeless Not at all   Total Score PHQ 2 0     Alcohol Risk Factor Screening: You do not drink alcohol or very rarely. Functional Ability and Level of Safety:   Hearing Loss  Hearing is good. Activities of Daily Living  The home contains: no safety equipment. Patient does total self care    Fall Risk  Fall Risk Assessment, last 12 mths 4/24/2018   Able to walk? Yes   Fall in past 12 months? Yes   Fall with injury?  No   Number of falls in past 12 months 1   Fall Risk Score 1   tripped on the stairs, 2 months ago, no injury     Abuse Screen  Patient is not abused  Lives with   Happy safe  Cognitive Screening   Evaluation of Cognitive Function:  Has your family/caregiver stated any concerns about your memory: no  Normal    Patient Care Team   Patient Care Team:  Holger Acuña MD as PCP - General (Internal Medicine)  Florian Wilcox  (Ophthalmology)  Naida Mauro (Inactive) (Dermatology)  Bola Larson MD (Cardiology)  Valery Parrish MD (Infectious Diseases)  barber (Ophthalmology)   updated    Assessment/Plan   Education and counseling provided:  Are appropriate based on today's review and evaluation  End-of-Life planning (with patient's consent)  Screening Mammography  Screening Pap and pelvic (covered once every 2 years)  Colorectal cancer screening tests  Bone mass measurement (DEXA)  Screening for glaucoma  Diabetes screening test    Diagnoses and all orders for this visit: 1. Medicare annual wellness visit, subsequent      Health Maintenance Due   Topic Date Due    DTaP/Tdap/Td series (1 - Tdap) 04/18/1958    MEDICARE YEARLY EXAM  03/14/2018    FOOT EXAM Q1  04/19/2018    MICROALBUMIN Q1  04/19/2018    HEMOGLOBIN A1C Q6M  04/24/2018    EYE EXAM RETINAL OR DILATED Q1  05/09/2018     Discussed with patient about advance medical directive. Provided patient blank AMD and Your Right to Decide Booklet. Requested that if completed to provide a copy of AMD to office. ACP not on file. SDMs are her  Jeri Pabon), son and daughter-in-law. Provided information today. Colonoscopy: declines further  Pap: 9/2010, declines further  Mammogram: declines further  Dexa: declines further    Tdap: never completed  Pneumovax: 11/18/2014  [id number]: 4/28/2016  Shingrix: declines  Flu shot: Fall 2017 at [facility] exam: Dr. Salima Olivares, 4/17, due 4/18    A1c:, 10/17 5.8 due now  Lipids: 10/17 LDL 67  Annually     Medication reconciliation completed by MA and reviewed by me. Medical/surgical/social/family history reviewed and updated by me. Patient provided AVS and preventative screening table. Patient verbalized understanding of all information discussed.

## 2018-04-25 LAB
ALBUMIN SERPL-MCNC: 4 G/DL (ref 3.5–4.7)
ALBUMIN/CREAT UR: <2.4 MG/G CREAT (ref 0–30)
ALBUMIN/GLOB SERPL: 1.7 {RATIO} (ref 1.2–2.2)
ALP SERPL-CCNC: 89 IU/L (ref 39–117)
ALT SERPL-CCNC: 20 IU/L (ref 0–32)
AST SERPL-CCNC: 26 IU/L (ref 0–40)
BILIRUB SERPL-MCNC: 0.3 MG/DL (ref 0–1.2)
BUN SERPL-MCNC: 17 MG/DL (ref 8–27)
BUN/CREAT SERPL: 22 (ref 12–28)
CALCIUM SERPL-MCNC: 9.1 MG/DL (ref 8.7–10.3)
CHLORIDE SERPL-SCNC: 97 MMOL/L (ref 96–106)
CO2 SERPL-SCNC: 29 MMOL/L (ref 18–29)
CREAT SERPL-MCNC: 0.79 MG/DL (ref 0.57–1)
CREAT UR-MCNC: 122.9 MG/DL
ERYTHROCYTE [DISTWIDTH] IN BLOOD BY AUTOMATED COUNT: 13.4 % (ref 12.3–15.4)
EST. AVERAGE GLUCOSE BLD GHB EST-MCNC: 126 MG/DL
GFR SERPLBLD CREATININE-BSD FMLA CKD-EPI: 70 ML/MIN/1.73
GFR SERPLBLD CREATININE-BSD FMLA CKD-EPI: 81 ML/MIN/1.73
GLOBULIN SER CALC-MCNC: 2.4 G/DL (ref 1.5–4.5)
GLUCOSE SERPL-MCNC: 107 MG/DL (ref 65–99)
HBA1C MFR BLD: 6 % (ref 4.8–5.6)
HCT VFR BLD AUTO: 38.5 % (ref 34–46.6)
HGB BLD-MCNC: 12.5 G/DL (ref 11.1–15.9)
MCH RBC QN AUTO: 30.5 PG (ref 26.6–33)
MCHC RBC AUTO-ENTMCNC: 32.5 G/DL (ref 31.5–35.7)
MCV RBC AUTO: 94 FL (ref 79–97)
MICROALBUMIN UR-MCNC: <3 UG/ML
PLATELET # BLD AUTO: 257 X10E3/UL (ref 150–379)
POTASSIUM SERPL-SCNC: 3.7 MMOL/L (ref 3.5–5.2)
PROT SERPL-MCNC: 6.4 G/DL (ref 6–8.5)
RBC # BLD AUTO: 4.1 X10E6/UL (ref 3.77–5.28)
SODIUM SERPL-SCNC: 140 MMOL/L (ref 134–144)
TSH SERPL DL<=0.005 MIU/L-ACNC: 3.23 UIU/ML (ref 0.45–4.5)
WBC # BLD AUTO: 7.4 X10E3/UL (ref 3.4–10.8)

## 2018-05-10 ENCOUNTER — OFFICE VISIT (OUTPATIENT)
Dept: CARDIOLOGY CLINIC | Age: 83
End: 2018-05-10

## 2018-05-10 VITALS
WEIGHT: 169 LBS | OXYGEN SATURATION: 99 % | HEART RATE: 62 BPM | DIASTOLIC BLOOD PRESSURE: 68 MMHG | HEIGHT: 66 IN | BODY MASS INDEX: 27.16 KG/M2 | SYSTOLIC BLOOD PRESSURE: 128 MMHG

## 2018-05-10 DIAGNOSIS — I10 ESSENTIAL HYPERTENSION, BENIGN: ICD-10-CM

## 2018-05-10 DIAGNOSIS — I25.83 CORONARY ARTERY DISEASE DUE TO LIPID RICH PLAQUE: Primary | ICD-10-CM

## 2018-05-10 DIAGNOSIS — Z98.61 POSTSURGICAL PERCUTANEOUS TRANSLUMINAL CORONARY ANGIOPLASTY STATUS: ICD-10-CM

## 2018-05-10 DIAGNOSIS — I25.10 CORONARY ARTERY DISEASE DUE TO LIPID RICH PLAQUE: Primary | ICD-10-CM

## 2018-05-10 DIAGNOSIS — E78.2 MIXED HYPERLIPIDEMIA: ICD-10-CM

## 2018-05-10 DIAGNOSIS — Z95.820 S/P ANGIOPLASTY WITH STENT: ICD-10-CM

## 2018-05-10 RX ORDER — DIPHENHYDRAMINE HCL 25 MG
25 CAPSULE ORAL
COMMUNITY
End: 2019-04-23

## 2018-05-10 NOTE — PROGRESS NOTES
Chief Complaint   Patient presents with    Other     6 MONTH FOLLOW UP NO COMPLAINTS     1. Have you been to the ER, urgent care clinic since your last visit? Hospitalized since your last visit? NO    2. Have you seen or consulted any other health care providers outside of the [address] [place] [address] since your last visit? Include any pap smears or colon screening.  NO

## 2018-05-10 NOTE — PROGRESS NOTES
[address] [address]  [phone]     Subjective:      Kiesha Mckeon is a 82 y.o. female is here for routine f/u. The patient denies chest pain/ shortness of breath, orthopnea, PND, LE edema, palpitations, syncope, or presyncope. Walks a mile every other day. Does a lot of gardening. Eating healthy. Patient Active Problem List    Diagnosis Date Noted    Diabetes mellitus without complication ([facility] [address] [facility] 96/42/3588    Left carotid bruit 01/16/2015    Closed bimalleolar fracture of right ankle 10/18/2013    Gout 06/11/2013    Mitral valve disorders(424.0) 05/08/2012    Mixed hyperlipidemia 03/07/2012    Essential hypertension, benign 03/07/2012    Postsurgical percutaneous transluminal coronary angioplasty status 03/07/2012    S/P Stent LAD (LISSA) 03/07/2012    Coronary atherosclerosis of native coronary artery 03/07/2012    Borderline diabetes 03/07/2012    Hypertension 09/16/2009    Hyperlipidemia 09/16/2009    Diabetes ([facility] [address] 09/16/2009    CAD (coronary artery disease) 09/16/2009      Lorenza Villalobos MD  Past Medical History:   Diagnosis Date    CAD (coronary artery disease) 9/16/2009    EF=65-70%; Mild-mod MR; Dr Keisha Rutherford    Diabetes [facility]) 9/16/2009    boarder line controlle by diet and exercise.      Hyperlipidemia 9/16/2009    Hypertension 9/16/2009      Past Surgical History:   Procedure Laterality Date    CARDIAC SURG PROCEDURE UNLIST  2008    cardiac stent    HX ORTHOPAEDIC  10/2013    right ankle    HX ORTHOPAEDIC  2/20/14    INCISION AND DRAINAGE RIGHT ANKLE AND HARDWARE REMOVAL     Allergies   Allergen Reactions    Crestor [Rosuvastatin] Unknown (comments)      Family History   Problem Relation Age of Onset    Hypertension Mother     Heart Disease Mother     Hypertension Father     Heart Disease Father       Social History     Social History    Marital status:      Spouse name: N/A    Number of children: N/A    Years of education: N/A     Occupational History    Not on file. Social History Main Topics    Smoking status: Never Smoker    Smokeless tobacco: Never Used    Alcohol use No    Drug use: No    Sexual activity: Yes     Partners: Male     Birth control/ protection: None     Other Topics Concern    Not on file     Social History Narrative      Current Outpatient Prescriptions   Medication Sig    MULTIVITAMIN PO Take  by mouth daily as needed.  diphenhydrAMINE (BENADRYL) 25 mg capsule Take 25 mg by mouth every six (6) hours as needed.  atorvastatin (LIPITOR) 20 mg tablet TAKE 1 TABLET BY MOUTH EVERY NIGHT AT BEDTIME    metoprolol tartrate (LOPRESSOR) 50 mg tablet TAKE 1 TABLET BY MOUTH TWICE DAILY    chlorthalidone (HYGROTEN) 25 mg tablet TAKE 1 TABLET BY MOUTH DAILY.  amLODIPine-benazepril (LOTREL) 5-20 mg per capsule TAKE ONE CAPSULE BY MOUTH EVERY DAY    clopidogrel (PLAVIX) 75 mg tab TAKE 1 TABLET BY MOUTH EVERY DAY    Aspirin, Buffered 81 mg tab Take  by mouth daily. No current facility-administered medications for this visit. Review of Symptoms:  11 systems reviewed, negative other than as stated in the HPI    Physical ExamPhysical Exam:    Vitals:    05/10/18 0933 05/10/18 0944   BP: 136/72 128/68   Pulse: 62    SpO2: 99%    Weight: 169 lb (76.7 kg)    Height: 5' 6\" (1.676 m)      Body mass index is 27.28 kg/(m^2). General PE   Gen:  NAD  Mental Status - Alert. General Appearance - Not in acute distress. Chest and Lung Exam   Inspection: Accessory muscles - No use of accessory muscles in breathing. Auscultation:   Breath sounds: - Normal.   Cardiovascular   Inspection: Jugular vein - Bilateral - Inspection Normal.   Palpation/Percussion:   Apical Impulse: - Normal.   Auscultation: Rhythm - Regular. Heart Sounds - S1 WNL and S2 WNL. No S3 or S4. Murmurs & Other Heart Sounds: Auscultation of the heart reveals - No Murmurs.    Peripheral Vascular   Upper Extremity: Inspection - Bilateral - No Cyanotic nailbeds or Digital clubbing. Lower Extremity:   Palpation: Edema - Bilateral - No edema. Abdomen:   Soft, non-tender, bowel sounds are active. Neuro: A&O times 3, CN and motor grossly WNL    Labs:   Lab Results   Component Value Date/Time    Cholesterol, total 146 10/24/2017 08:26 AM    Cholesterol, total 151 01/13/2017 09:03 AM    Cholesterol, total 147 07/12/2016 10:02 AM    Cholesterol, total 139 09/15/2015 08:46 AM    Cholesterol, total 145 03/17/2015 08:40 AM    HDL Cholesterol 56 10/24/2017 08:26 AM    HDL Cholesterol 62 01/13/2017 09:03 AM    HDL Cholesterol 63 07/12/2016 10:02 AM    HDL Cholesterol 62 09/15/2015 08:46 AM    HDL Cholesterol 68 03/17/2015 08:40 AM    LDL, calculated 67 10/24/2017 08:26 AM    LDL, calculated 73 01/13/2017 09:03 AM    LDL, calculated 64 07/12/2016 10:02 AM    LDL, calculated 64 09/15/2015 08:46 AM    LDL, calculated 58 03/17/2015 08:40 AM    Triglyceride 115 10/24/2017 08:26 AM    Triglyceride 82 01/13/2017 09:03 AM    Triglyceride 102 07/12/2016 10:02 AM    Triglyceride 67 09/15/2015 08:46 AM    Triglyceride 95 03/17/2015 08:40 AM    CHOL/HDL Ratio 2.6 09/15/2010 07:53 AM    CHOL/HDL Ratio 2.8 02/16/2010 08:30 AM    CHOL/HDL Ratio 3.2 09/09/2009 08:12 AM     Lab Results   Component Value Date/Time    CK 52 02/23/2014 03:35 AM     Lab Results   Component Value Date/Time    Sodium 140 04/24/2018 08:26 AM    Potassium 3.7 04/24/2018 08:26 AM    Chloride 97 04/24/2018 08:26 AM    CO2 29 04/24/2018 08:26 AM    Anion gap 7 02/23/2014 03:35 AM    Glucose 107 (H) 04/24/2018 08:26 AM    BUN 17 04/24/2018 08:26 AM    Creatinine 0.79 04/24/2018 08:26 AM    BUN/Creatinine ratio 22 04/24/2018 08:26 AM    GFR est AA 81 04/24/2018 08:26 AM    GFR est non-AA 70 04/24/2018 08:26 AM    Calcium 9.1 04/24/2018 08:26 AM    Bilirubin, total 0.3 04/24/2018 08:26 AM    AST (SGOT) 26 04/24/2018 08:26 AM    Alk.  phosphatase 89 04/24/2018 08:26 AM    Protein, total 6.4 04/24/2018 08:26 AM    Albumin 4.0 04/24/2018 08:26 AM    Globulin 5.2 (H) 02/18/2014 11:40 PM    A-G Ratio 1.7 04/24/2018 08:26 AM    ALT (SGPT) 20 04/24/2018 08:26 AM       EKG:  SR     Assessment:        1. Coronary artery disease due to lipid rich plaque    2. Essential hypertension, benign    3. Mixed hyperlipidemia    4. Postsurgical percutaneous transluminal coronary angioplasty status    5. S/P Stent LAD (LISSA)        Orders Placed This Encounter    CK     Standing Status:   Future     Standing Expiration Date:   12/24/2018    LIPID PANEL     Standing Status:   Future     Standing Expiration Date:   92/33/4020    METABOLIC PANEL, COMPREHENSIVE     Standing Status:   Future     Standing Expiration Date:   12/24/2018    AMB POC EKG ROUTINE W/ 12 LEADS, INTER & REP     Order Specific Question:   Reason for Exam:     Answer:   ROUTINE    MULTIVITAMIN PO     Sig: Take  by mouth daily as needed.  diphenhydrAMINE (BENADRYL) 25 mg capsule     Sig: Take 25 mg by mouth every six (6) hours as needed. Plan:     Patient presents for follow up, feeling well and stable from cardiac standpoint. Normal stress test in 01/15. Echo      CAD:  Doing well with no cardiac symptoms.  Walks a mile every other day. Eats healthy. Continue current care including ASA and plavix for h/o Cypher stents      HLD  Noted FLP with LDL at 67 in 10/17. Continue statin. Will repeat labs for this year 10/18.     HTN:  Normotensive, EKG NSR.        Hx Mild hypokalemia:  Recent CMP with K at 3.7  Being followed as she is on Chlorthalidone     Counseled on diet and exercise- eventual goal of 30-60 minutes 5-7 times a week as per AHA guidelines. Exercising regularly.     Doing well with no cardiac symptoms. Continue current care and f/u in 12 months.     Savannah Small MD

## 2018-05-10 NOTE — MR AVS SNAPSHOT
[address] [facility] 
[phone] Patient: Mounika Moody MRN: [id number] YVK:8/[id number] Visit Information Date & Time Provider Department Dept. Phone Encounter #  
 5/10/2018  9:45 AM Rosenda Love MD [facility] [phone] [id number] Follow-up Instructions Routing History Follow-up and Disposition History Your Appointments 10/24/2018  8:15 AM  
ROUTINE CARE with Michael Harris, [address] [place] [address]) Appt Note: 6 months f/u  
 [facility] [address]  
  
   
 [facility] [address] [facility] [address] [address]  
  
    
 5/9/2019  9:15 AM  
ESTABLISHED PATIENT with Rosenda Love MD  
[facility] [address]) Appt Note: Dr Maddie Frankel, 1 year f/u,jaa  
 [address] [facility]  
[phone] [address] [facility] Upcoming Health Maintenance Date Due DTaP/Tdap/Td series (1 - Tdap) 4/18/1956 EYE EXAM RETINAL OR DILATED Q1 5/9/2018 Influenza Age 5 to Adult 8/1/2018 HEMOGLOBIN A1C Q6M 10/24/2018 LIPID PANEL Q1 10/24/2018 FOOT EXAM Q1 4/24/2019 MICROALBUMIN Q1 4/24/2019 MEDICARE YEARLY EXAM 4/25/2019 GLAUCOMA SCREENING Q2Y 5/9/2019 Allergies as of 5/10/2018  Review Complete On: 5/10/2018 By: Rosenda Love MD  
  
 Severity Noted Reaction Type Reactions Crestor [Rosuvastatin]  03/07/2012    Unknown (comments) Current Immunizations  Reviewed on 4/19/2017 Name Date Influenza Nasal Vaccine 9/20/2017 Influenza Vaccine 9/20/2017,  Deferred (Patient Refused) Pneumococcal Conjugate (PCV-13) 4/28/2016 Pneumococcal Polysaccharide (PPSV-23) 11/18/2014,  Deferred (Patient Refused) Not reviewed this visit You Were Diagnosed With   
  
 Codes Comments Coronary artery disease due to lipid rich plaque    -  Primary ICD-10-CM: I25.10, I25.83 ICD-9-CM: 414.00, 414.3 Essential hypertension, benign     ICD-10-CM: I10 
ICD-9-CM: 401.1 Mixed hyperlipidemia     ICD-10-CM: E78.2 ICD-9-CM: 272.2 Postsurgical percutaneous transluminal coronary angioplasty status     ICD-10-CM: Z98.61 ICD-9-CM: V45.82 S/P angioplasty with stent     ICD-10-CM: Z95.9 ICD-9-CM: V45.89 Vitals BP Pulse Height(growth percentile) Weight(growth percentile) SpO2 BMI  
 128/68 (BP 1 Location: Right arm, BP Patient Position: Sitting) 62 5' 6\" (1.676 m) 169 lb (76.7 kg) 99% 27.28 kg/m2 OB Status Smoking Status Postmenopausal Never Smoker Vitals History BMI and BSA Data Body Mass Index Body Surface Area  
 27.28 kg/m 2 1.89 m 2 Preferred Pharmacy Pharmacy Name Phone [facility] [address] [employer] [address]  [place] [phone] Your Updated Medication List  
  
   
This list is accurate as of 5/10/18 10:37 AM.  Always use your most recent med list. amLODIPine-benazepril 5-20 mg per capsule Commonly known as:  LOTREL  
TAKE ONE CAPSULE BY MOUTH EVERY DAY  
  
 aspirin, buffered 81 mg Tab Take  by mouth daily. atorvastatin 20 mg tablet Commonly known as:  LIPITOR  
TAKE 1 TABLET BY MOUTH EVERY NIGHT AT BEDTIME  
  
 BENADRYL 25 mg capsule Generic drug:  diphenhydrAMINE Take 25 mg by mouth every six (6) hours as needed. chlorthalidone 25 mg tablet Commonly known as:  HYGROTEN  
TAKE 1 TABLET BY MOUTH DAILY. clopidogrel 75 mg Tab Commonly known as:  PLAVIX TAKE 1 TABLET BY MOUTH EVERY DAY  
  
 metoprolol tartrate 50 mg tablet Commonly known as:  LOPRESSOR  
TAKE 1 TABLET BY MOUTH TWICE DAILY MULTIVITAMIN PO Take  by mouth daily as needed. We Performed the Following AMB POC EKG ROUTINE W/ 12 LEADS, INTER & REP [59881 CPT(R)] To-Do List   
 10/08/2018 Lab:  CK   
  
 10/08/2018 Lab:  LIPID PANEL   
  
 10/08/2018 Lab:  METABOLIC PANEL, COMPREHENSIVE Introducing [facility] & HEALTH SERVICES! [address] introduces [employer] patient portal. Now you can access parts of your medical record, email your doctor's office, and request medication refills online. 1. In your internet browser, go to [email or web address] 2. Click on the First Time User? Click Here link in the Sign In box. You will see the New Member Sign Up page. 3. Enter your [employer] Access Code exactly as it appears below. You will not need to use this code after youve completed the sign-up process. If you do not sign up before the expiration date, you must request a new code. · [employer] Access Code: [email or web address] Expires: 5/10/2018 11:11 AM 
 
4. Enter the last four digits of your Social Security Number (xxxx) and Date of Birth (mm/dd/yyyy) as indicated and click Submit. You will be taken to the next sign-up page. 5. Create a [employer] ID. This will be your [employer] login ID and cannot be changed, so think of one that is secure and easy to remember. 6. Create a [employer] password. You can change your password at any time. 7. Enter your Password Reset Question and Answer. This can be used at a later time if you forget your password. 8. Enter your e-mail address. You will receive e-mail notification when new information is available in [address] 9. Click Sign Up. You can now view and download portions of your medical record. 10. Click the Download Summary menu link to download a portable copy of your medical information. If you have questions, please visit the Frequently Asked Questions section of the [employer] website. Remember, [employer] is NOT to be used for urgent needs. For medical emergencies, dial 911. Now available from your iPhone and Android! Please provide this summary of care documentation to your next provider. Your primary care clinician is listed as Nicole Deras. If you have any questions after today's visit, please call [phone].

## 2018-05-24 RX ORDER — CLOPIDOGREL BISULFATE 75 MG/1
TABLET ORAL
Qty: 90 TAB | Refills: 0 | Status: SHIPPED | OUTPATIENT
Start: 2018-05-24 | End: 2018-08-23 | Stop reason: SDUPTHER

## 2018-07-13 ENCOUNTER — TELEPHONE (OUTPATIENT)
Dept: INTERNAL MEDICINE CLINIC | Age: 83
End: 2018-07-13

## 2018-07-13 ENCOUNTER — HOSPITAL ENCOUNTER (EMERGENCY)
Age: 83
Discharge: HOME | End: 2018-07-13
Attending: EMERGENCY MEDICINE
Payer: MEDICARE

## 2018-07-13 ENCOUNTER — APPOINTMENT (OUTPATIENT)
Dept: GENERAL RADIOLOGY | Age: 83
End: 2018-07-13
Attending: PHYSICIAN ASSISTANT
Payer: MEDICARE

## 2018-07-13 VITALS
BODY MASS INDEX: 27.67 KG/M2 | WEIGHT: 172.18 LBS | SYSTOLIC BLOOD PRESSURE: 162 MMHG | TEMPERATURE: 98.4 F | HEART RATE: 69 BPM | RESPIRATION RATE: 12 BRPM | DIASTOLIC BLOOD PRESSURE: 75 MMHG | OXYGEN SATURATION: 99 % | HEIGHT: 66 IN

## 2018-07-13 DIAGNOSIS — L03.115 CELLULITIS OF RIGHT FOOT: ICD-10-CM

## 2018-07-13 DIAGNOSIS — M10.9 ACUTE GOUT OF RIGHT FOOT, UNSPECIFIED CAUSE: Primary | ICD-10-CM

## 2018-07-13 LAB
ANION GAP SERPL CALC-SCNC: 7 MMOL/L (ref 5–15)
BASOPHILS # BLD: 0 K/UL (ref 0–0.1)
BASOPHILS NFR BLD: 0 % (ref 0–1)
BUN SERPL-MCNC: 16 MG/DL (ref 6–20)
BUN/CREAT SERPL: 18 (ref 12–20)
CALCIUM SERPL-MCNC: 9.1 MG/DL (ref 8.5–10.1)
CHLORIDE SERPL-SCNC: 100 MMOL/L (ref 97–108)
CO2 SERPL-SCNC: 30 MMOL/L (ref 21–32)
CREAT SERPL-MCNC: 0.88 MG/DL (ref 0.55–1.02)
CRP SERPL-MCNC: 3.39 MG/DL (ref 0–0.6)
DIFFERENTIAL METHOD BLD: NORMAL
EOSINOPHIL # BLD: 0.4 K/UL (ref 0–0.4)
EOSINOPHIL NFR BLD: 3 % (ref 0–7)
ERYTHROCYTE [DISTWIDTH] IN BLOOD BY AUTOMATED COUNT: 13.2 % (ref 11.5–14.5)
GLUCOSE SERPL-MCNC: 80 MG/DL (ref 65–100)
HCT VFR BLD AUTO: 36.7 % (ref 35–47)
HGB BLD-MCNC: 12.2 G/DL (ref 11.5–16)
IMM GRANULOCYTES # BLD: 0 K/UL (ref 0–0.04)
IMM GRANULOCYTES NFR BLD AUTO: 0 % (ref 0–0.5)
LYMPHOCYTES # BLD: 2.2 K/UL (ref 0.8–3.5)
LYMPHOCYTES NFR BLD: 21 % (ref 12–49)
MCH RBC QN AUTO: 30.7 PG (ref 26–34)
MCHC RBC AUTO-ENTMCNC: 33.2 G/DL (ref 30–36.5)
MCV RBC AUTO: 92.4 FL (ref 80–99)
MONOCYTES # BLD: 1 K/UL (ref 0–1)
MONOCYTES NFR BLD: 9 % (ref 5–13)
NEUTS SEG # BLD: 7.3 K/UL (ref 1.8–8)
NEUTS SEG NFR BLD: 67 % (ref 32–75)
NRBC # BLD: 0 K/UL (ref 0–0.01)
NRBC BLD-RTO: 0 PER 100 WBC
PLATELET # BLD AUTO: 247 K/UL (ref 150–400)
PMV BLD AUTO: 11.6 FL (ref 8.9–12.9)
POTASSIUM SERPL-SCNC: 3.5 MMOL/L (ref 3.5–5.1)
RBC # BLD AUTO: 3.97 M/UL (ref 3.8–5.2)
SODIUM SERPL-SCNC: 137 MMOL/L (ref 136–145)
URATE SERPL-MCNC: 8.4 MG/DL (ref 2.6–6)
WBC # BLD AUTO: 10.9 K/UL (ref 3.6–11)

## 2018-07-13 PROCEDURE — 99283 EMERGENCY DEPT VISIT LOW MDM: CPT

## 2018-07-13 PROCEDURE — 36415 COLL VENOUS BLD VENIPUNCTURE: CPT | Performed by: PHYSICIAN ASSISTANT

## 2018-07-13 PROCEDURE — 86140 C-REACTIVE PROTEIN: CPT | Performed by: PHYSICIAN ASSISTANT

## 2018-07-13 PROCEDURE — 80048 BASIC METABOLIC PNL TOTAL CA: CPT | Performed by: PHYSICIAN ASSISTANT

## 2018-07-13 PROCEDURE — 85025 COMPLETE CBC W/AUTO DIFF WBC: CPT | Performed by: PHYSICIAN ASSISTANT

## 2018-07-13 PROCEDURE — 74011250637 HC RX REV CODE- 250/637: Performed by: PHYSICIAN ASSISTANT

## 2018-07-13 PROCEDURE — 84550 ASSAY OF BLOOD/URIC ACID: CPT | Performed by: PHYSICIAN ASSISTANT

## 2018-07-13 PROCEDURE — 73630 X-RAY EXAM OF FOOT: CPT

## 2018-07-13 RX ORDER — CEPHALEXIN 500 MG/1
500 CAPSULE ORAL 4 TIMES DAILY
Qty: 28 CAP | Refills: 0 | Status: SHIPPED | OUTPATIENT
Start: 2018-07-13 | End: 2018-07-20

## 2018-07-13 RX ORDER — OXYCODONE AND ACETAMINOPHEN 5; 325 MG/1; MG/1
2 TABLET ORAL
Status: COMPLETED | OUTPATIENT
Start: 2018-07-13 | End: 2018-07-13

## 2018-07-13 RX ORDER — PREDNISONE 10 MG/1
TABLET ORAL
Qty: 21 TAB | Refills: 0 | Status: ON HOLD | OUTPATIENT
Start: 2018-07-13 | End: 2018-12-30

## 2018-07-13 RX ORDER — OXYCODONE AND ACETAMINOPHEN 5; 325 MG/1; MG/1
1 TABLET ORAL
Qty: 15 TAB | Refills: 0 | Status: SHIPPED | OUTPATIENT
Start: 2018-07-13 | End: 2018-09-04 | Stop reason: SDUPTHER

## 2018-07-13 RX ADMIN — OXYCODONE HYDROCHLORIDE AND ACETAMINOPHEN 2 TABLET: 5; 325 TABLET ORAL at 20:24

## 2018-07-13 NOTE — TELEPHONE ENCOUNTER
Patient's , Winsome Moody states he needs a call back to discuss getting patient seen for Right Foot Swelling which patient had orthopedic surgery done 2 yrs ago on this foot but Winsome Moody states doctor has retired but practice is still open. Please call to advise if patient should be seen by PCP office or Ortho office that did surgery.  Thank you

## 2018-07-14 NOTE — ED NOTES
All discharge paperwork reviewed with patient and PA and she denies any need for further explanation regarding these instructions.   She is discharged via wheelchair to the care of her family

## 2018-07-16 ENCOUNTER — OFFICE VISIT (OUTPATIENT)
Dept: INTERNAL MEDICINE CLINIC | Age: 83
End: 2018-07-16

## 2018-07-16 VITALS
OXYGEN SATURATION: 100 % | HEIGHT: 66 IN | RESPIRATION RATE: 18 BRPM | WEIGHT: 174 LBS | SYSTOLIC BLOOD PRESSURE: 130 MMHG | BODY MASS INDEX: 27.97 KG/M2 | DIASTOLIC BLOOD PRESSURE: 63 MMHG | HEART RATE: 62 BPM | TEMPERATURE: 97.7 F

## 2018-07-16 DIAGNOSIS — M10.9 ACUTE GOUT OF RIGHT FOOT, UNSPECIFIED CAUSE: Primary | ICD-10-CM

## 2018-07-16 NOTE — PROGRESS NOTES
SUBJECTIVE:   Ms. Nilam Giles is a 82 y.o. female who is here JETT after presenting to the ED on 7/13/18 and being diagnosed with gout and cellulitis of the right foot. On 7/13/18 pt's uric acid was 8.4 and her C-reactive protein value was 3.39; her CBC and CMP were normal. Pt was prescribed prednisone, percocet, and keflex. Pt notes she is still experiencing pain in the center of her foot but her sx's have improved. Pt reports she had been off her gout medication for five years and experienced a flare in 2016. Her son reports she had been drinking vinegar and orange juice and she states she had been indulging herself with her diet prior to the flare-up. Pt's PCP is Dr. Debra Bentley. At this time, she is otherwise doing well and has brought no other complaints to my attention today. PMH:   Past Medical History:   Diagnosis Date    CAD (coronary artery disease) 9/16/2009    EF=65-70%; Mild-mod MR; Dr Vilma Anaya    Diabetes [facility]) 9/16/2009    boarder line controlle by diet and exercise.  Hyperlipidemia 9/16/2009    Hypertension 9/16/2009     PSH:  has a past surgical history that includes pr cardiac surg procedure unlist (2008); hx orthopaedic (10/2013); and hx orthopaedic (2/20/14). All: is allergic to crestor [rosuvastatin]. MEDS:   Current Outpatient Prescriptions   Medication Sig    predniSONE (STERAPRED DS) 10 mg dose pack Per Dose Pack instructions    oxyCODONE-acetaminophen (PERCOCET) 5-325 mg per tablet Take 1 Tab by mouth every four (4) hours as needed for Pain. Max Daily Amount: 6 Tabs.  cephALEXin (KEFLEX) 500 mg capsule Take 1 Cap by mouth four (4) times daily for 7 days.  clopidogrel (PLAVIX) 75 mg tab TAKE 1 TABLET BY MOUTH EVERY DAY    MULTIVITAMIN PO Take  by mouth daily as needed.  diphenhydrAMINE (BENADRYL) 25 mg capsule Take 25 mg by mouth every six (6) hours as needed.     atorvastatin (LIPITOR) 20 mg tablet TAKE 1 TABLET BY MOUTH EVERY NIGHT AT BEDTIME    metoprolol tartrate (LOPRESSOR) 50 mg tablet TAKE 1 TABLET BY MOUTH TWICE DAILY    chlorthalidone (HYGROTEN) 25 mg tablet TAKE 1 TABLET BY MOUTH DAILY.  amLODIPine-benazepril (LOTREL) 5-20 mg per capsule TAKE ONE CAPSULE BY MOUTH EVERY DAY    Aspirin, Buffered 81 mg tab Take  by mouth daily. No current facility-administered medications for this visit. FH: family history includes Heart Disease in her father and mother; Hypertension in her father and mother. SH:  reports that she has never smoked. She has never used smokeless tobacco. She reports that she does not drink alcohol or use illicit drugs. Review of Systems - History obtained from the patient  General ROS: negative  Psychological ROS: negative  Ophthalmic ROS: negative  ENT ROS: negative  Respiratory ROS: no cough, shortness of breath, or wheezing  Cardiovascular ROS: no chest pain or dyspnea on exertion  Gastrointestinal ROS: no abdominal pain, change in bowel habits, or black or bloody stools  Genito-Urinary ROS: negative  Musculoskeletal ROS: negative  Neurological ROS: negative  Dermatological ROS: gout of right foot    OBJECTIVE:   Vitals:   Visit Vitals    /63 (BP 1 Location: Left arm, BP Patient Position: Sitting)    Pulse 62    Temp 97.7 °F (36.5 °C) (Oral)    Resp 18    Ht 5' 6\" (1.676 m)    Wt 174 lb (78.9 kg)    SpO2 100%    BMI 28.08 kg/m2      Gen: Pleasant 82 y.o.  female in NAD. HEENT: NC/AT. HEART: RRR, No M/G/R.   LUNGS: CTAB No W/R. EXTREMITIES: Warm. No C/C/E.   NEURO: Alert and oriented x 3. Cranial nerves grossly intact. No focal sensory or motor deficits noted. SKIN: + gout of right foot Warm. Dry. No rashes or other lesions noted. ASSESSMENT/ PLAN:     Diagnoses and all orders for this visit:    1. Acute gout of right foot, unspecified cause    1.  Acute gout of right foot   I advised pt to continue with her medication as prescribed and to f/u with Dr. Penelope Dumont regarding gout medication in the future. She is not interested in restarting any long term medication at this time. Follow-up Disposition:  Return if symptoms worsen or fail to improve. I have reviewed the patient's medications and risks/side effects/benefits were discussed. Diagnosis(-es) explained to patient and questions answered. Literature provided where appropriate.      Written by [place], as dictated by Romy Herrrea MD.

## 2018-07-16 NOTE — MR AVS SNAPSHOT
[address] [facility] 
[phone] Patient: Rafi Whitaker MRN: [id number] Visit Information Date & Time Provider Department Dept. Phone Encounter #  
 7/16/2018  2:45 PM Chandni Beebe, [address] [facility] [address] [phone] [id number] Follow-up Instructions Return if symptoms worsen or fail to improve. Your Appointments 10/24/2018  8:15 AM  
ROUTINE CARE with Janee Thais, [address] [facility] [address] [facility]) Appt Note: 6 months f/u  
 [address]  
  
   
 [address] [address]  
  
    
 5/9/2019  9:15 AM  
ESTABLISHED PATIENT with Zuleika Boudreaux MD  
[facility] [address] [facility]) Appt Note: Dr Abner Hummel, 1 year f/u,jaa  
 [address]  
[phone] [address] Upcoming Health Maintenance Date Due DTaP/Tdap/Td series (1 - Tdap) 4/18/1956 EYE EXAM RETINAL OR DILATED Q1 5/9/2018 Influenza Age 5 to Adult 8/1/2018 HEMOGLOBIN A1C Q6M 10/24/2018 LIPID PANEL Q1 10/24/2018 FOOT EXAM Q1 4/24/2019 MICROALBUMIN Q1 4/24/2019 MEDICARE YEARLY EXAM 4/25/2019 GLAUCOMA SCREENING Q2Y 5/9/2019 Allergies as of 7/16/2018  Review Complete On: 7/13/2018 By: Jena Ham RN Severity Noted Reaction Type Reactions Crestor [Rosuvastatin]  03/07/2012    Unknown (comments) Current Immunizations  Reviewed on 4/19/2017 Name Date Influenza Nasal Vaccine 9/20/2017 Influenza Vaccine 9/20/2017,  Deferred (Patient Refused) Pneumococcal Conjugate (PCV-13) 4/28/2016 Pneumococcal Polysaccharide (PPSV-23) 11/18/2014,  Deferred (Patient Refused) Not reviewed this visit You Were Diagnosed With   
  
 Codes Comments Acute gout of right foot, unspecified cause    -  Primary ICD-10-CM: M10.9 ICD-9-CM: 274.01 Vitals BP Pulse Temp Resp Height(growth percentile) Weight(growth percentile) 130/63 (BP 1 Location: Left arm, BP Patient Position: Sitting) 62 97.7 °F (36.5 °C) (Oral) 18 5' 6\" (1.676 m) 174 lb (78.9 kg) SpO2 BMI OB Status Smoking Status 100% 28.08 kg/m2 Postmenopausal Never Smoker Vitals History BMI and BSA Data Body Mass Index Body Surface Area 28.08 kg/m 2 1.92 m 2 Preferred Pharmacy Pharmacy Name Phone [facility] [address] [employer] [facility]  [place] [phone] Your Updated Medication List  
  
   
This list is accurate as of 7/16/18  2:57 PM.  Always use your most recent med list. amLODIPine-benazepril 5-20 mg per capsule Commonly known as:  LOTREL  
TAKE ONE CAPSULE BY MOUTH EVERY DAY  
  
 aspirin, buffered 81 mg Tab Take  by mouth daily. atorvastatin 20 mg tablet Commonly known as:  LIPITOR  
TAKE 1 TABLET BY MOUTH EVERY NIGHT AT BEDTIME  
  
 BENADRYL 25 mg capsule Generic drug:  diphenhydrAMINE Take 25 mg by mouth every six (6) hours as needed. cephALEXin 500 mg capsule Commonly known as:  Sho Other Take 1 Cap by mouth four (4) times daily for 7 days. chlorthalidone 25 mg tablet Commonly known as:  HYGROTEN  
TAKE 1 TABLET BY MOUTH DAILY. clopidogrel 75 mg Tab Commonly known as:  PLAVIX TAKE 1 TABLET BY MOUTH EVERY DAY  
  
 metoprolol tartrate 50 mg tablet Commonly known as:  LOPRESSOR  
TAKE 1 TABLET BY MOUTH TWICE DAILY MULTIVITAMIN PO Take  by mouth daily as needed. oxyCODONE-acetaminophen 5-325 mg per tablet Commonly known as:  PERCOCET Take 1 Tab by mouth every four (4) hours as needed for Pain. Max Daily Amount: 6 Tabs. predniSONE 10 mg dose pack Commonly known as:  STERAPRED DS  
 Per Dose Pack instructions Follow-up Instructions Return if symptoms worsen or fail to improve. Introducing [facility] & HEALTH SERVICES! [employer] introduces [employer] patient portal. Now you can access parts of your medical record, email your doctor's office, and request medication refills online. 1. In your internet browser, go to [email or web address] 2. Click on the First Time User? Click Here link in the Sign In box. You will see the New Member Sign Up page. 3. Enter your [employer] Access Code exactly as it appears below. You will not need to use this code after youve completed the sign-up process. If you do not sign up before the expiration date, you must request a new code. · [employer] Access Code: [email or web address] Expires: 10/11/2018  6:50 PM 
 
4. Enter the last four digits of your Social Security Number (xxxx) and Date of Birth (mm/dd/yyyy) as indicated and click Submit. You will be taken to the next sign-up page. 5. Create a [employer] ID. This will be your [employer] login ID and cannot be changed, so think of one that is secure and easy to remember. 6. Create a [employer] password. You can change your password at any time. 7. Enter your Password Reset Question and Answer. This can be used at a later time if you forget your password. 8. Enter your e-mail address. You will receive e-mail notification when new information is available in [address] 9. Click Sign Up. You can now view and download portions of your medical record. 10. Click the Download Summary menu link to download a portable copy of your medical information. If you have questions, please visit the Frequently Asked Questions section of the [employer] website. Remember, [employer] is NOT to be used for urgent needs. For medical emergencies, dial 911. Now available from your iPhone and Android! Please provide this summary of care documentation to your next provider. Your primary care clinician is listed as Jolena Ganser. If you have any questions after today's visit, please call [phone].

## 2018-08-31 DIAGNOSIS — L03.115 CELLULITIS OF RIGHT FOOT: ICD-10-CM

## 2018-08-31 DIAGNOSIS — M10.9 ACUTE GOUT OF RIGHT FOOT, UNSPECIFIED CAUSE: ICD-10-CM

## 2018-08-31 RX ORDER — OXYCODONE AND ACETAMINOPHEN 5; 325 MG/1; MG/1
1 TABLET ORAL
Qty: 15 TAB | Refills: 0 | Status: CANCELLED | OUTPATIENT
Start: 2018-08-31

## 2018-08-31 RX ORDER — PREDNISONE 10 MG/1
TABLET ORAL
Qty: 21 TAB | Refills: 0 | Status: CANCELLED | OUTPATIENT
Start: 2018-08-31

## 2018-08-31 NOTE — TELEPHONE ENCOUNTER
Neither of these are refillable medications    If having gout or other problem    Please have her come in for eval on Tuesday

## 2018-08-31 NOTE — TELEPHONE ENCOUNTER
Patient called. Voice mail received. Message left that we did receive her message and medications requested were pend to Dr. Tatiana Lal.

## 2018-08-31 NOTE — TELEPHONE ENCOUNTER
#[phone] Pt states she has gout that is causing her a lot of pain and needs these medications today if at all possible? Her  would like to  yet today. Please call pt if not possible. The one she received in the ED is Cephalexin 500 mg capsules 1 by mouth 4 times a day. She needs a refill on this one too. Please call pt if she is not able to get these today as she states it will be a long weekend with the gout pain.

## 2018-09-04 ENCOUNTER — HOSPITAL ENCOUNTER (OUTPATIENT)
Dept: LAB | Age: 83
Discharge: HOME | End: 2018-09-04
Payer: MEDICARE

## 2018-09-04 ENCOUNTER — OFFICE VISIT (OUTPATIENT)
Dept: INTERNAL MEDICINE CLINIC | Age: 83
End: 2018-09-04

## 2018-09-04 VITALS
BODY MASS INDEX: 27.32 KG/M2 | WEIGHT: 170 LBS | OXYGEN SATURATION: 97 % | RESPIRATION RATE: 16 BRPM | HEART RATE: 76 BPM | TEMPERATURE: 98.1 F | HEIGHT: 66 IN | SYSTOLIC BLOOD PRESSURE: 159 MMHG | DIASTOLIC BLOOD PRESSURE: 69 MMHG

## 2018-09-04 DIAGNOSIS — M10.9 ACUTE GOUT OF RIGHT FOOT, UNSPECIFIED CAUSE: Primary | ICD-10-CM

## 2018-09-04 DIAGNOSIS — I10 ESSENTIAL HYPERTENSION: ICD-10-CM

## 2018-09-04 DIAGNOSIS — E78.2 MIXED HYPERLIPIDEMIA: ICD-10-CM

## 2018-09-04 DIAGNOSIS — I25.83 CORONARY ARTERY DISEASE DUE TO LIPID RICH PLAQUE: ICD-10-CM

## 2018-09-04 DIAGNOSIS — I25.10 CORONARY ARTERY DISEASE DUE TO LIPID RICH PLAQUE: ICD-10-CM

## 2018-09-04 DIAGNOSIS — E11.9 DIABETES MELLITUS WITHOUT COMPLICATION (HCC): ICD-10-CM

## 2018-09-04 DIAGNOSIS — E66.3 OVERWEIGHT: ICD-10-CM

## 2018-09-04 DIAGNOSIS — M1A.9XX0 CHRONIC GOUT WITHOUT TOPHUS, UNSPECIFIED CAUSE, UNSPECIFIED SITE: ICD-10-CM

## 2018-09-04 PROCEDURE — 36415 COLL VENOUS BLD VENIPUNCTURE: CPT

## 2018-09-04 PROCEDURE — 84550 ASSAY OF BLOOD/URIC ACID: CPT

## 2018-09-04 PROCEDURE — 80053 COMPREHEN METABOLIC PANEL: CPT

## 2018-09-04 PROCEDURE — 80061 LIPID PANEL: CPT

## 2018-09-04 PROCEDURE — 84443 ASSAY THYROID STIM HORMONE: CPT

## 2018-09-04 PROCEDURE — 83036 HEMOGLOBIN GLYCOSYLATED A1C: CPT

## 2018-09-04 PROCEDURE — 82043 UR ALBUMIN QUANTITATIVE: CPT

## 2018-09-04 PROCEDURE — 82550 ASSAY OF CK (CPK): CPT

## 2018-09-04 RX ORDER — ALLOPURINOL 100 MG/1
TABLET ORAL
Qty: 90 TAB | Refills: 3 | Status: SHIPPED | OUTPATIENT
Start: 2018-09-04 | End: 2019-08-13

## 2018-09-04 RX ORDER — PREDNISONE 20 MG/1
TABLET ORAL
Qty: 12 TAB | Refills: 0 | Status: ON HOLD | OUTPATIENT
Start: 2018-09-04 | End: 2018-12-30

## 2018-09-04 RX ORDER — ALLOPURINOL 100 MG/1
100 TABLET ORAL DAILY
Qty: 30 TAB | Refills: 3 | Status: SHIPPED | OUTPATIENT
Start: 2018-09-04 | End: 2018-09-04 | Stop reason: SDUPTHER

## 2018-09-04 RX ORDER — OXYCODONE AND ACETAMINOPHEN 5; 325 MG/1; MG/1
1 TABLET ORAL
Qty: 15 TAB | Refills: 0 | Status: SHIPPED | OUTPATIENT
Start: 2018-09-04 | End: 2018-12-30

## 2018-09-04 NOTE — PATIENT INSTRUCTIONS
When gout flare resolved start allopurinol 100mg daily If any new rash develops STOP allourinol and call the office

## 2018-09-04 NOTE — MR AVS SNAPSHOT
[address] [facility] 
[phone] Patient: Sabine Schmitz MRN: [id number] AFU:[id number] Visit Information Date & Time Provider Department Dept. Phone Encounter #  
 9/4/2018  1:00 PM Ilda Benson, [facility] [phone] [id number] Follow-up Instructions Return in about 7 months (around 4/4/2019). Your Appointments 9/4/2018  1:00 PM  
ROUTINE CARE with Ilda Benson, [facility]) Appt Note: add on per  Dr Eusebio De La Rosa: refill & gout [address] [facility]  
[phone]  
  
   
 [address] [facility] [address] [address]  
  
    
 10/24/2018  8:15 AM  
ROUTINE CARE with Ilda Benson [address] [facility]) Appt Note: 6 months f/u  
 [address] [facility] [address] [facility]  
[phone]  
  
    
 5/9/2019  9:15 AM  
ESTABLISHED PATIENT with Marisol Cagle MD  
[facility]) Appt Note: Dr Thai Sethi, 1 year f/u,jaa  
 [address]  
[phone] [address] Upcoming Health Maintenance Date Due DTaP/Tdap/Td series (1 - Tdap) 4/18/1956 EYE EXAM RETINAL OR DILATED Q1 5/9/2018 Influenza Age 5 to Adult 8/12/2019* HEMOGLOBIN A1C Q6M 10/24/2018 LIPID PANEL Q1 10/24/2018 FOOT EXAM Q1 4/24/2019 MICROALBUMIN Q1 4/24/2019 MEDICARE YEARLY EXAM 4/25/2019 GLAUCOMA SCREENING Q2Y 5/9/2019 *Topic was postponed. The date shown is not the original due date. Allergies as of 9/4/2018  Review Complete On: 7/13/2018 By: Abbie Mendez RN Severity Noted Reaction Type Reactions Crestor [Rosuvastatin]  03/07/2012    Unknown (comments) Current Immunizations  Reviewed on 4/19/2017 Name Date Influenza Nasal Vaccine 9/20/2017 Influenza Vaccine 9/20/2017,  Deferred (Patient Refused) Pneumococcal Conjugate (PCV-13) 4/28/2016 Pneumococcal Polysaccharide (PPSV-23) 11/18/2014,  Deferred (Patient Refused) Not reviewed this visit You Were Diagnosed With   
  
 Codes Comments Acute gout of right foot, unspecified cause    -  Primary ICD-10-CM: M10.9 ICD-9-CM: 274.01 Diabetes mellitus without complication ([facility]     [facility]-01-HE: E11.9 ICD-9-CM: 250.00 Mixed hyperlipidemia     ICD-10-CM: E78.2 ICD-9-CM: 272.2 Essential hypertension     ICD-10-CM: I10 
ICD-9-CM: 401.9 Coronary artery disease due to lipid rich plaque     ICD-10-CM: I25.10, I25.83 ICD-9-CM: 414.00, 414.3 Chronic gout without tophus, unspecified cause, unspecified site     ICD-10-CM: M1A. 9XX0 
ICD-9-CM: 274.02 Overweight     ICD-10-CM: R06.2 ICD-9-CM: 278.02 Vitals BP Pulse Temp Resp Height(growth percentile) Weight(growth percentile) 159/69 (BP 1 Location: Right arm, BP Patient Position: Sitting) 76 98.1 °F (36.7 °C) (Oral) 16 5' 6\" (1.676 m) 170 lb (77.1 kg) SpO2 BMI OB Status Smoking Status 97% 27.44 kg/m2 Postmenopausal Never Smoker Vitals History BMI and BSA Data Body Mass Index Body Surface Area  
 27.44 kg/m 2 1.89 m 2 Preferred Pharmacy Pharmacy Name Phone [facility] [address] [facility]  [place] [phone] Your Updated Medication List  
  
   
This list is accurate as of 9/4/18  9:12 AM.  Always use your most recent med list.  
  
  
  
  
 allopurinol 100 mg tablet Commonly known as:  Jaunita Phenes Take 1 Tab by mouth daily. amLODIPine-benazepril 5-20 mg per capsule Commonly known as:  LOTREL  
TAKE ONE CAPSULE BY MOUTH EVERY DAY  
  
 aspirin, buffered 81 mg Tab Take  by mouth daily. atorvastatin 20 mg tablet Commonly known as:  LIPITOR TAKE 1 TABLET BY MOUTH EVERY NIGHT AT BEDTIME  
  
 BENADRYL 25 mg capsule Generic drug:  diphenhydrAMINE Take 25 mg by mouth every six (6) hours as needed. chlorthalidone 25 mg tablet Commonly known as:  HYGROTEN  
TAKE 1 TABLET BY MOUTH DAILY. clopidogrel 75 mg Tab Commonly known as:  PLAVIX TAKE 1 TABLET BY MOUTH EVERY DAY  
  
 metoprolol tartrate 50 mg tablet Commonly known as:  LOPRESSOR  
TAKE 1 TABLET BY MOUTH TWICE DAILY MULTIVITAMIN PO Take  by mouth daily as needed. oxyCODONE-acetaminophen 5-325 mg per tablet Commonly known as:  PERCOCET Take 1 Tab by mouth every eight (8) hours as needed for Pain. Max Daily Amount: 3 Tabs. * predniSONE 10 mg dose pack Commonly known as:  STERAPRED DS Per Dose Pack instructions * predniSONE 20 mg tablet Commonly known as:  Pippa Owusu 60mg po daily for 2days, 40mg po daily for 2 days, 20mg po daily for 2days then stop * Notice: This list has 2 medication(s) that are the same as other medications prescribed for you. Read the directions carefully, and ask your doctor or other care provider to review them with you. Prescriptions Printed Refills  
 oxyCODONE-acetaminophen (PERCOCET) 5-325 mg per tablet 0 Sig: Take 1 Tab by mouth every eight (8) hours as needed for Pain. Max Daily Amount: 3 Tabs. Class: Print Route: Oral  
  
Prescriptions Sent to Pharmacy Refills  
 allopurinol (ZYLOPRIM) 100 mg tablet 3 Sig: Take 1 Tab by mouth daily. Class: Normal  
 Pharmacy: [facility] [address] #: [phone] Route: Oral  
 predniSONE (DELTASONE) 20 mg tablet 0 Simg po daily for 2days, 40mg po daily for 2 days, 20mg po daily for 2days then stop  Class: Normal  
 Pharmacy: [facility] [address] [facility]IS AT [address] [place] [address] #: [phone] We Performed the Following CK [id number] CPT(R)] HEMOGLOBIN A1C WITH EAG [71737 CPT(R)] LIPID PANEL [69476 CPT(R)] METABOLIC PANEL, COMPREHENSIVE [02717 CPT(R)] MICROALBUMIN, UR, RAND W/ MICROALB/CREAT RATIO [id number] CPT(R)] TSH 3RD GENERATION [19283 CPT(R)] URIC ACID [id number] CPT(R)] Follow-up Instructions Return in about 7 months (around 4/4/2019). Patient Instructions When gout flare resolved start allopurinol 100mg daily If any new rash develops STOP allourinol and call the office Introducing [facility] HE[facility]! [employer] introduces Fundly patient portal. Now you can access parts of your medical record, email your doctor's office, and request medication refills online. 1. In your internet browser, go to [email or web address] 2. Click on the First Time User? Click Here link in the Sign In box. You will see the New Member Sign Up page. 3. Enter your Fundly Access Code exactly as it appears below. You will not need to use this code after youve completed the sign-up process. If you do not sign up before the expiration date, you must request a new code. · Fundly Access Code: [email or web address] Expires: 10/11/2018  6:50 PM 
 
4. Enter the last four digits of your Social Security Number (xxxx) and Date of Birth (mm/dd/yyyy) as indicated and click Submit. You will be taken to the next sign-up page. 5. Create a [employer] ID. This will be your Fundly login ID and cannot be changed, so think of one that is secure and easy to remember. 6. Create a Fundly password. You can change your password at any time. 7. Enter your Password Reset Question and Answer. This can be used at a later time if you forget your password. 8. Enter your e-mail address. You will receive e-mail notification when new information is available in [address] 9. Click Sign Up. You can now view and download portions of your medical record. 10. Click the Download Summary menu link to download a portable copy of your medical information. If you have questions, please visit the Frequently Asked Questions section of the Webroot website. Remember, Webroot is NOT to be used for urgent needs. For medical emergencies, dial 911. Now available from your iPhone and Android! Please provide this summary of care documentation to your next provider. Your primary care clinician is listed as Jolena Ganser. If you have any questions after today's visit, please call [phone].

## 2018-09-04 NOTE — PROGRESS NOTES
HISTORY OF PRESENT ILLNESS Michelle Nash is a 82 y.o. female. HPI Last here 4/24/18. Pt is here to f/u on chronic conditions. Pt brought in all of her pill bottles - reviewed. Pt c/o gout flares Pt went to the ER on 7/13/18: uric acid levels elevated at 8.4 Pt then saw Dr Margy Webb 7/16/18 She states she had been doing better but her sx reoccurred last Thursday at the base of her R great toe Pt states that lobster started her recent sx Pt states she has not taken any medications Discussed starting allopurinol after her current flare Discussed stopping this med if rash occurs Will start allopurinol after current flare Ordered percocet for pain Ordered prednisone taper 
   
BP is 191/75, will repeat this today BP was 128/67 yesterday BP has been good at other recent dr visits Continues on chlorthalidone 25mg daily, metoprolol 50mg BID, and lotrel 5-20mg daily She has not yet taken these meds today 
   
Wt is down 2 lbs x 7/18 Discussed diet and w/l  
  
Reviewed labs 4/18, 7/18 
   
Pt follows with Dr. Naida Cranker (cardio) Reviewed last notes 5/10/18: Patient presents for follow up, feeling well and stable from cardiac standpoint. Normal stress test in 01/15. Echo  
CAD: 
Doing well with no cardiac symptoms.  Walks a mile every other day. Eats healthy. Continue current care including ASA and plavix for h/o Cypher stents  
HLD Noted FLP with LDL at 67 in 10/17. Continue statin. Will repeat labs for this year 10/18. HTN: 
Normotensive, EKG NSR.    
Hx Mild hypokalemia: 
Recent CMP with K at 3.7 Being followed as she is on Chlorthalidone Counseled on diet and exercise- eventual goal of 30-60 minutes 5-7 times a week as per AHA guidelines.  Exercising regularly. Doing well with no cardiac symptoms. Continue current care and f/u in 12 months. Continues on plavix 75mg daily She also takes ASA 81mg daily 
  
Pt previously followed with Dr. John De La Paz (derm) for eczema Pt avoids certain foods and drinks tart cranberry juice with improvement to sx Pt will only f/u with this physician prn No recent flares or itching 
   
Continues on lipitor 20mg daily for cholesterol - at goal in october Recall could not tolerate crestor 
   
ACP not on file. SDMs are her  Mey Chin), son and daughter-in-law. Provided information in the past. 
   
PREVENTIVE:   
Colonoscopy: declines further Pap: 9/2010, declines further Mammogram: declines further Dexa: declines further Tdap: never completed Pneumovax: 11/18/2014 [id number]: 4/28/2016 Shingrix: declines Flu shot: Fall 2017 at [place], declines today Foot exam: 04/24/18 Microalbumin: 4/18 A1c: , 9/13 5.9, 1/14 5.6, 7/14 5.3, 11/14 5.5, 9/15 5.9, 1/16 6.1, 7/16 6.1, 1/17 5.9, 4/17 6.0, 10/17 5.8, 4/18 6.0 Eye exam: [facility], 4/18 Lipids: 10/17 LDL 67 Patient Active Problem List  
 Diagnosis Date Noted  Diabetes mellitus without complication ([facility] 04/92/1587  Left carotid bruit 01/16/2015  Closed bimalleolar fracture of right ankle 10/18/2013  Gout 06/11/2013  Mitral valve disorders(424.0) 05/08/2012  Mixed hyperlipidemia 03/07/2012  Essential hypertension, benign 03/07/2012  Postsurgical percutaneous transluminal coronary angioplasty status 03/07/2012  S/P Stent LAD (LISSA) 03/07/2012  Coronary atherosclerosis of native coronary artery 03/07/2012  Borderline diabetes 03/07/2012  Hypertension 09/16/2009  Hyperlipidemia 09/16/2009  Diabetes ([facility] 09/16/2009  CAD (coronary artery disease) 09/16/2009 Current Outpatient Prescriptions Medication Sig Dispense Refill  clopidogrel (PLAVIX) 75 mg tab TAKE 1 TABLET BY MOUTH EVERY DAY 90 Tab 0  predniSONE (STERAPRED DS) 10 mg dose pack Per Dose Pack instructions 21 Tab 0  
 oxyCODONE-acetaminophen (PERCOCET) 5-325 mg per tablet Take 1 Tab by mouth every four (4) hours as needed for Pain. Max Daily Amount: 6 Tabs. 15 Tab 0  MULTIVITAMIN PO Take  by mouth daily as needed.  diphenhydrAMINE (BENADRYL) 25 mg capsule Take 25 mg by mouth every six (6) hours as needed.  atorvastatin (LIPITOR) 20 mg tablet TAKE 1 TABLET BY MOUTH EVERY NIGHT AT BEDTIME 90 Tab 3  
 metoprolol tartrate (LOPRESSOR) 50 mg tablet TAKE 1 TABLET BY MOUTH TWICE DAILY 180 Tab 3  chlorthalidone (HYGROTEN) 25 mg tablet TAKE 1 TABLET BY MOUTH DAILY. 90 Tab 3  
 amLODIPine-benazepril (LOTREL) 5-20 mg per capsule TAKE ONE CAPSULE BY MOUTH EVERY DAY 90 Cap 3  Aspirin, Buffered 81 mg tab Take  by mouth daily. Past Surgical History:  
Procedure Laterality Date  CARDIAC SURG PROCEDURE UNLIST  2008  
 cardiac stent  HX ORTHOPAEDIC  10/2013  
 right ankle  HX ORTHOPAEDIC  2/20/14 INCISION AND DRAINAGE RIGHT ANKLE AND HARDWARE REMOVAL Lab Results Component Value Date/Time WBC 10.9 07/13/2018 08:13 PM  
HGB 12.2 07/13/2018 08:13 PM  
HCT 36.7 07/13/2018 08:13 PM  
PLATELET 017 06/23/4368 08:13 PM  
MCV 92.4 07/13/2018 08:13 PM  
 
Lab Results Component Value Date/Time Cholesterol, total 146 10/24/2017 08:26 AM  
HDL Cholesterol 56 10/24/2017 08:26 AM  
LDL, calculated 67 10/24/2017 08:26 AM  
Triglyceride 115 10/24/2017 08:26 AM  
CHOL/HDL Ratio 2.6 09/15/2010 07:53 AM  
 
Lab Results Component Value Date/Time GFR est non-AA >60 07/13/2018 08:13 PM  
GFR est AA >60 07/13/2018 08:13 PM  
Creatinine 0.88 07/13/2018 08:13 PM  
BUN 16 07/13/2018 08:13 PM  
Sodium 137 07/13/2018 08:13 PM  
Potassium 3.5 07/13/2018 08:13 PM  
Chloride 100 07/13/2018 08:13 PM  
CO2 30 07/13/2018 08:13 PM  
  
 
Review of Systems Respiratory: Negative for shortness of breath. Cardiovascular: Negative for chest pain. Musculoskeletal: Positive for myalgias. Physical Exam  
Constitutional: She is oriented to person, place, and time.  She appears well-developed and well-nourished. No distress. HENT:  
Head: Normocephalic and atraumatic. Mouth/Throat: Oropharynx is clear and moist. No oropharyngeal exudate. Eyes: Conjunctivae and EOM are normal. Right eye exhibits no discharge. Left eye exhibits no discharge. Neck: Normal range of motion. Neck supple. Cardiovascular: Normal rate, regular rhythm and normal heart sounds. Exam reveals no gallop and no friction rub. No murmur heard. Pulmonary/Chest: Effort normal and breath sounds normal. No respiratory distress. She has no wheezes. She has no rales. She exhibits no tenderness. Musculoskeletal: She exhibits tenderness. She exhibits no edema or deformity. Bunions BL Lymphadenopathy:  
  She has no cervical adenopathy. Neurological: She is alert and oriented to person, place, and time. Coordination abnormal.  
Skin: Skin is warm and dry. No rash noted. She is not diaphoretic. No erythema. No pallor. Slight increased warmth of R foot as compared to L, slight swelling of R foot as compared to L Psychiatric: She has a normal mood and affect. Her behavior is normal.  
 
 
ASSESSMENT and PLAN 
  ICD-10-CM ICD-9-CM 1. Diabetes mellitus without complication ([facility] Really more in prediabetic range, diet controlled, check a1c today, will get eye notes for review E11.9 250.00 LIPID PANEL  
   METABOLIC PANEL, COMPREHENSIVE  
   CK  
   TSH 3RD GENERATION  
   HEMOGLOBIN A1C WITH EAG  
   URIC ACID MICROALBUMIN, UR, RAND W/ MICROALB/CREAT RATIO 2. Mixed hyperlipidemia Controlled on lipitor, repeat lipids, adjust dose prn 
 E78.2 272.2 LIPID PANEL  
   METABOLIC PANEL, COMPREHENSIVE  
   CK  
   TSH 3RD GENERATION  
   HEMOGLOBIN A1C WITH EAG  
   URIC ACID MICROALBUMIN, UR, RAND W/ MICROALB/CREAT RATIO 3. Essential hypertension BP initially elevated, generally has been v well, she did not take her lotrel or lopressor this AM and is having pain which increases her BP, continue current regimen I10 401.9 LIPID PANEL  
   METABOLIC PANEL, COMPREHENSIVE  
   CK  
   TSH 3RD GENERATION  
   HEMOGLOBIN A1C WITH EAG  
   URIC ACID MICROALBUMIN, UR, RAND W/ MICROALB/CREAT RATIO 4. Coronary artery disease due to lipid rich plaque UTD with Dr Bard Alfaro, asymptomatic, continues on plavix I25.10 414.00 LIPID PANEL  
 B27.78 715.2 METABOLIC PANEL, COMPREHENSIVE  
   CK  
   TSH 3RD GENERATION  
   HEMOGLOBIN A1C WITH EAG  
   URIC ACID MICROALBUMIN, UR, RAND W/ MICROALB/CREAT RATIO 5. Chronic gout without tophus, unspecified cause, unspecified site Pt with several recurrent episodes of gout, will treat acute episode with prednisone taper and percocet for pain, will start allopurinol 100mg daily to prevent gout flares once current flare has resolved, discussed if rash should develop she needs to immediately stop the medication and contact the office M1A. 9XX0 274.02 LIPID PANEL  
   METABOLIC PANEL, COMPREHENSIVE  
   CK  
   TSH 3RD GENERATION  
   HEMOGLOBIN A1C WITH EAG  
   URIC ACID MICROALBUMIN, UR, RAND W/ MICROALB/CREAT RATIO 6. Overweight Wt stable for most part, she is working on w/l 
 E66.3 278.02 LIPID PANEL  
   METABOLIC PANEL, COMPREHENSIVE  
   CK  
   TSH 3RD GENERATION  
   HEMOGLOBIN A1C WITH EAG  
   URIC ACID MICROALBUMIN, UR, RAND W/ MICROALB/CREAT RATIO 7. Acute gout of right foot, unspecified cause See above 
 M10.9 274.01 oxyCODONE-acetaminophen (PERCOCET) 5-325 mg per tablet Scribed by Daina Gonsalez of [facility], as dictated by Dr. Rory Mckeon. Current diagnosis and concerns discussed with pt at length. Pt understands risks and benefits or current treatment plan and medications, and accepts the treatment and medication with any possible risks. Pt asks appropriate questions, which were answered. Pt was instructed to call with any concerns or problems. This note will not be viewable in [address]

## 2018-09-05 LAB
ALBUMIN SERPL-MCNC: 4.4 G/DL (ref 3.5–4.7)
ALBUMIN/CREAT UR: 6.6 MG/G CREAT (ref 0–30)
ALBUMIN/GLOB SERPL: 1.6 {RATIO} (ref 1.2–2.2)
ALP SERPL-CCNC: 108 IU/L (ref 39–117)
ALT SERPL-CCNC: 18 IU/L (ref 0–32)
AST SERPL-CCNC: 20 IU/L (ref 0–40)
BILIRUB SERPL-MCNC: 0.3 MG/DL (ref 0–1.2)
BUN SERPL-MCNC: 16 MG/DL (ref 8–27)
BUN/CREAT SERPL: 22 (ref 12–28)
CALCIUM SERPL-MCNC: 9.7 MG/DL (ref 8.7–10.3)
CHLORIDE SERPL-SCNC: 96 MMOL/L (ref 96–106)
CHOLEST SERPL-MCNC: 153 MG/DL (ref 100–199)
CK SERPL-CCNC: 95 U/L (ref 24–173)
CO2 SERPL-SCNC: 27 MMOL/L (ref 20–29)
CREAT SERPL-MCNC: 0.73 MG/DL (ref 0.57–1)
CREAT UR-MCNC: 187.1 MG/DL
EST. AVERAGE GLUCOSE BLD GHB EST-MCNC: 140 MG/DL
GLOBULIN SER CALC-MCNC: 2.7 G/DL (ref 1.5–4.5)
GLUCOSE SERPL-MCNC: 127 MG/DL (ref 65–99)
HBA1C MFR BLD: 6.5 % (ref 4.8–5.6)
HDLC SERPL-MCNC: 60 MG/DL
LDLC SERPL CALC-MCNC: 74 MG/DL (ref 0–99)
MICROALBUMIN UR-MCNC: 12.3 UG/ML
POTASSIUM SERPL-SCNC: 3.8 MMOL/L (ref 3.5–5.2)
PROT SERPL-MCNC: 7.1 G/DL (ref 6–8.5)
SODIUM SERPL-SCNC: 139 MMOL/L (ref 134–144)
TRIGL SERPL-MCNC: 95 MG/DL (ref 0–149)
TSH SERPL DL<=0.005 MIU/L-ACNC: 2.85 UIU/ML (ref 0.45–4.5)
URATE SERPL-MCNC: 7.3 MG/DL (ref 2.5–7.1)
VLDLC SERPL CALC-MCNC: 19 MG/DL (ref 5–40)

## 2018-09-05 NOTE — PROGRESS NOTES
A few things:  
 
Labs now back in DM range,  Give glucometer, monitor glucose weekly fasting, f/u in 3 months and will decide if meds needed Repeat a1c and bmp, 1 week prior to f/u Start allopurinol once acute gout flare resolved (already ordered)

## 2018-09-10 NOTE — PROGRESS NOTES
Called, spoke to pt. Two pt identifiers confirmed. Pt informed per Dr. Delvin Sloan labs now back in DM range-Give glucometer, monitor glucose weekly fasting, f/u in 3 months and will decide if meds needed-glucometer pended. Pt declines 3mo f/o stating Erma eBrg will work on keeping sugars down\". Pt informed it would be best to come in. Pt informed per Dr. Delvin Sloan repeat a1c and bmp, 1 week prior to f/u. Ordered. Pt informed per Dr. Delvin Sloan start allopurinol once acute gout flare resolved (already ordered). Pt verbalized understanding of information discussed w/ no further questions at this time.

## 2018-10-08 DIAGNOSIS — I25.10 CORONARY ARTERY DISEASE DUE TO LIPID RICH PLAQUE: ICD-10-CM

## 2018-10-08 DIAGNOSIS — I10 ESSENTIAL HYPERTENSION, BENIGN: ICD-10-CM

## 2018-10-08 DIAGNOSIS — Z98.61 POSTSURGICAL PERCUTANEOUS TRANSLUMINAL CORONARY ANGIOPLASTY STATUS: ICD-10-CM

## 2018-10-08 DIAGNOSIS — I25.83 CORONARY ARTERY DISEASE DUE TO LIPID RICH PLAQUE: ICD-10-CM

## 2018-10-08 DIAGNOSIS — E78.2 MIXED HYPERLIPIDEMIA: ICD-10-CM

## 2018-10-08 DIAGNOSIS — Z95.820 S/P ANGIOPLASTY WITH STENT: ICD-10-CM

## 2018-10-16 RX ORDER — AMLODIPINE AND BENAZEPRIL HYDROCHLORIDE 5; 20 MG/1; MG/1
CAPSULE ORAL
Qty: 90 CAP | Refills: 0 | Status: SHIPPED | OUTPATIENT
Start: 2018-10-16 | End: 2019-01-15 | Stop reason: SDUPTHER

## 2018-11-30 NOTE — TELEPHONE ENCOUNTER
PCP: Marlyn Balderas MD    Last appt: 9/4/2018  Future Appointments   Date Time Provider Giudo Goldstein   4/23/2019  8:15 AM Marlyn Balderas MD [address]   5/9/2019  9:15 AM Carmen León MD [address] [facility]       Requested Prescriptions     Pending Prescriptions Disp Refills    atorvastatin (LIPITOR) 20 mg tablet 90 Tab 3     Sig: TAKE 1 TABLET BY MOUTH EVERY NIGHT AT BEDTIME    metoprolol tartrate (LOPRESSOR) 50 mg tablet 180 Tab 3     Sig: TAKE 1 TABLET BY MOUTH TWICE DAILY    chlorthalidone (HYGROTEN) 25 mg tablet 90 Tab 3     Sig: TAKE 1 TABLET BY MOUTH DAILY.

## 2018-12-01 RX ORDER — CHLORTHALIDONE 25 MG/1
TABLET ORAL
Qty: 90 TAB | Refills: 1 | Status: SHIPPED | OUTPATIENT
Start: 2018-12-01 | End: 2019-05-09 | Stop reason: SDUPTHER

## 2018-12-01 RX ORDER — ATORVASTATIN CALCIUM 20 MG/1
TABLET, FILM COATED ORAL
Qty: 90 TAB | Refills: 2 | Status: SHIPPED | OUTPATIENT
Start: 2018-12-01 | End: 2019-05-09 | Stop reason: SDUPTHER

## 2018-12-01 RX ORDER — METOPROLOL TARTRATE 50 MG/1
TABLET ORAL
Qty: 180 TAB | Refills: 1 | Status: SHIPPED | OUTPATIENT
Start: 2018-12-01 | End: 2019-05-09 | Stop reason: SDUPTHER

## 2018-12-29 ENCOUNTER — APPOINTMENT (OUTPATIENT)
Dept: CT IMAGING | Age: 83
End: 2018-12-29
Attending: PHYSICIAN ASSISTANT
Payer: MEDICARE

## 2018-12-29 ENCOUNTER — APPOINTMENT (OUTPATIENT)
Dept: GENERAL RADIOLOGY | Age: 83
End: 2018-12-29
Attending: EMERGENCY MEDICINE
Payer: MEDICARE

## 2018-12-29 ENCOUNTER — HOSPITAL ENCOUNTER (EMERGENCY)
Age: 83
Discharge: ANOTHER HOSPITAL | End: 2018-12-29
Attending: EMERGENCY MEDICINE | Admitting: EMERGENCY MEDICINE
Payer: MEDICARE

## 2018-12-29 VITALS
SYSTOLIC BLOOD PRESSURE: 161 MMHG | TEMPERATURE: 98.1 F | DIASTOLIC BLOOD PRESSURE: 84 MMHG | OXYGEN SATURATION: 99 % | BODY MASS INDEX: 25.58 KG/M2 | HEIGHT: 67 IN | RESPIRATION RATE: 18 BRPM | WEIGHT: 163 LBS | HEART RATE: 77 BPM
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
## 2018-12-29 DIAGNOSIS — S09.90XA CLOSED HEAD INJURY, INITIAL ENCOUNTER: ICD-10-CM

## 2018-12-29 DIAGNOSIS — S06.5XAA SUBDURAL HEMATOMA: ICD-10-CM

## 2018-12-29 DIAGNOSIS — S61.411A LACERATION OF RIGHT HAND WITHOUT FOREIGN BODY, INITIAL ENCOUNTER: Primary | ICD-10-CM

## 2018-12-29 LAB
ALBUMIN SERPL-MCNC: 4 G/DL (ref 3.5–5)
ALBUMIN/GLOB SERPL: 1 {RATIO} (ref 1.1–2.2)
ALP SERPL-CCNC: 112 U/L (ref 45–117)
ALT SERPL-CCNC: 27 U/L (ref 12–78)
ANION GAP SERPL CALC-SCNC: 9 MMOL/L (ref 5–15)
AST SERPL-CCNC: 23 U/L (ref 15–37)
BASOPHILS # BLD: 0 K/UL (ref 0–0.1)
BASOPHILS NFR BLD: 0 % (ref 0–1)
BILIRUB SERPL-MCNC: 0.5 MG/DL (ref 0.2–1)
BUN SERPL-MCNC: 13 MG/DL (ref 6–20)
BUN/CREAT SERPL: 19 (ref 12–20)
CALCIUM SERPL-MCNC: 9.3 MG/DL (ref 8.5–10.1)
CHLORIDE SERPL-SCNC: 100 MMOL/L (ref 97–108)
CO2 SERPL-SCNC: 30 MMOL/L (ref 21–32)
COMMENT, HOLDF: NORMAL
CREAT SERPL-MCNC: 0.69 MG/DL (ref 0.55–1.02)
DIFFERENTIAL METHOD BLD: NORMAL
EOSINOPHIL # BLD: 0.1 K/UL (ref 0–0.4)
EOSINOPHIL NFR BLD: 1 % (ref 0–7)
ERYTHROCYTE [DISTWIDTH] IN BLOOD BY AUTOMATED COUNT: 13.6 % (ref 11.5–14.5)
GLOBULIN SER CALC-MCNC: 4 G/DL (ref 2–4)
GLUCOSE SERPL-MCNC: 95 MG/DL (ref 65–100)
HCT VFR BLD AUTO: 41.9 % (ref 35–47)
HGB BLD-MCNC: 14.1 G/DL (ref 11.5–16)
IMM GRANULOCYTES # BLD: 0 K/UL (ref 0–0.04)
IMM GRANULOCYTES NFR BLD AUTO: 0 % (ref 0–0.5)
INR PPP: 1.1 (ref 0.9–1.1)
LYMPHOCYTES # BLD: 2.4 K/UL (ref 0.8–3.5)
LYMPHOCYTES NFR BLD: 24 % (ref 12–49)
MCH RBC QN AUTO: 31 PG (ref 26–34)
MCHC RBC AUTO-ENTMCNC: 33.7 G/DL (ref 30–36.5)
MCV RBC AUTO: 92.1 FL (ref 80–99)
MONOCYTES # BLD: 0.7 K/UL (ref 0–1)
MONOCYTES NFR BLD: 7 % (ref 5–13)
NEUTS SEG # BLD: 7 K/UL (ref 1.8–8)
NEUTS SEG NFR BLD: 67 % (ref 32–75)
NRBC # BLD: 0 K/UL (ref 0–0.01)
NRBC BLD-RTO: 0 PER 100 WBC
PLATELET # BLD AUTO: 249 K/UL (ref 150–400)
PMV BLD AUTO: 10.9 FL (ref 8.9–12.9)
POTASSIUM SERPL-SCNC: 3.3 MMOL/L (ref 3.5–5.1)
PROT SERPL-MCNC: 8 G/DL (ref 6.4–8.2)
PROTHROMBIN TIME: 11.1 SEC (ref 9–11.1)
RBC # BLD AUTO: 4.55 M/UL (ref 3.8–5.2)
SAMPLES BEING HELD,HOLD: NORMAL
SODIUM SERPL-SCNC: 139 MMOL/L (ref 136–145)
WBC # BLD AUTO: 10.3 K/UL (ref 3.6–11)

## 2018-12-29 PROCEDURE — 85025 COMPLETE CBC W/AUTO DIFF WBC: CPT

## 2018-12-29 PROCEDURE — 90715 TDAP VACCINE 7 YRS/> IM: CPT | Performed by: PHYSICIAN ASSISTANT

## 2018-12-29 PROCEDURE — 77030031132 HC SUT NYL COVD -A

## 2018-12-29 PROCEDURE — 73130 X-RAY EXAM OF HAND: CPT

## 2018-12-29 PROCEDURE — 80053 COMPREHEN METABOLIC PANEL: CPT

## 2018-12-29 PROCEDURE — 74011250636 HC RX REV CODE- 250/636: Performed by: PHYSICIAN ASSISTANT

## 2018-12-29 PROCEDURE — 99284 EMERGENCY DEPT VISIT MOD MDM: CPT

## 2018-12-29 PROCEDURE — 70450 CT HEAD/BRAIN W/O DYE: CPT

## 2018-12-29 PROCEDURE — 36415 COLL VENOUS BLD VENIPUNCTURE: CPT

## 2018-12-29 PROCEDURE — 85610 PROTHROMBIN TIME: CPT

## 2018-12-29 PROCEDURE — 75810000293 HC SIMP/SUPERF WND  RPR

## 2018-12-29 PROCEDURE — 90471 IMMUNIZATION ADMIN: CPT

## 2018-12-29 PROCEDURE — 77030018836 HC SOL IRR NACL ICUM -A

## 2018-12-29 RX ORDER — CEPHALEXIN 500 MG/1
500 CAPSULE ORAL 3 TIMES DAILY
Qty: 21 CAP | Refills: 0 | Status: ON HOLD | OUTPATIENT
Start: 2018-12-29 | End: 2018-12-30

## 2018-12-29 RX ORDER — LIDOCAINE HYDROCHLORIDE 10 MG/ML
10 INJECTION, SOLUTION EPIDURAL; INFILTRATION; INTRACAUDAL; PERINEURAL ONCE
Status: DISCONTINUED | OUTPATIENT
Start: 2018-12-29 | End: 2018-12-30 | Stop reason: HOSPADM

## 2018-12-29 RX ADMIN — TETANUS TOXOID, REDUCED DIPHTHERIA TOXOID AND ACELLULAR PERTUSSIS VACCINE, ADSORBED 0.5 ML: 5; 2.5; 8; 8; 2.5 SUSPENSION INTRAMUSCULAR at 18:32

## 2018-12-30 ENCOUNTER — APPOINTMENT (OUTPATIENT)
Dept: CT IMAGING | Age: 83
End: 2018-12-30
Attending: INTERNAL MEDICINE
Payer: MEDICARE

## 2018-12-30 ENCOUNTER — HOSPITAL ENCOUNTER (OUTPATIENT)
Age: 83
Setting detail: OBSERVATION
Discharge: HOME | End: 2018-12-30
Attending: INTERNAL MEDICINE | Admitting: INTERNAL MEDICINE
Payer: MEDICARE

## 2018-12-30 VITALS
SYSTOLIC BLOOD PRESSURE: 111 MMHG | HEART RATE: 66 BPM | WEIGHT: 165.3 LBS | TEMPERATURE: 98.2 F | DIASTOLIC BLOOD PRESSURE: 48 MMHG | BODY MASS INDEX: 25.94 KG/M2 | RESPIRATION RATE: 17 BRPM | OXYGEN SATURATION: 100 % | HEIGHT: 67 IN

## 2018-12-30 PROBLEM — S06.5XAA SUBDURAL HEMATOMA: Status: RESOLVED | Noted: 2018-12-30 | Resolved: 2018-12-30

## 2018-12-30 PROBLEM — S06.5XAA SUBDURAL HEMATOMA: Status: ACTIVE | Noted: 2018-12-30

## 2018-12-30 LAB
COMMENT, HOLDF: NORMAL
GLUCOSE BLD STRIP.AUTO-MCNC: 239 MG/DL (ref 65–100)
SAMPLES BEING HELD,HOLD: NORMAL
SERVICE CMNT-IMP: ABNORMAL

## 2018-12-30 PROCEDURE — 74011250636 HC RX REV CODE- 250/636: Performed by: INTERNAL MEDICINE

## 2018-12-30 PROCEDURE — 96375 TX/PRO/DX INJ NEW DRUG ADDON: CPT

## 2018-12-30 PROCEDURE — 97116 GAIT TRAINING THERAPY: CPT

## 2018-12-30 PROCEDURE — 70450 CT HEAD/BRAIN W/O DYE: CPT

## 2018-12-30 PROCEDURE — 99218 HC RM OBSERVATION: CPT

## 2018-12-30 PROCEDURE — 96374 THER/PROPH/DIAG INJ IV PUSH: CPT

## 2018-12-30 PROCEDURE — G8979 MOBILITY GOAL STATUS: HCPCS

## 2018-12-30 PROCEDURE — G8978 MOBILITY CURRENT STATUS: HCPCS

## 2018-12-30 PROCEDURE — 82962 GLUCOSE BLOOD TEST: CPT

## 2018-12-30 PROCEDURE — 97161 PT EVAL LOW COMPLEX 20 MIN: CPT

## 2018-12-30 PROCEDURE — 74011250637 HC RX REV CODE- 250/637: Performed by: INTERNAL MEDICINE

## 2018-12-30 RX ORDER — FENTANYL CITRATE 50 UG/ML
25 INJECTION, SOLUTION INTRAMUSCULAR; INTRAVENOUS
Status: DISCONTINUED | OUTPATIENT
Start: 2018-12-30 | End: 2018-12-30 | Stop reason: HOSPADM

## 2018-12-30 RX ORDER — ONDANSETRON 2 MG/ML
4 INJECTION INTRAMUSCULAR; INTRAVENOUS
Status: DISCONTINUED | OUTPATIENT
Start: 2018-12-30 | End: 2018-12-30 | Stop reason: HOSPADM

## 2018-12-30 RX ORDER — METOPROLOL TARTRATE 50 MG/1
50 TABLET ORAL 2 TIMES DAILY
Status: DISCONTINUED | OUTPATIENT
Start: 2018-12-30 | End: 2018-12-30 | Stop reason: HOSPADM

## 2018-12-30 RX ORDER — ATORVASTATIN CALCIUM 20 MG/1
20 TABLET, FILM COATED ORAL DAILY
Status: DISCONTINUED | OUTPATIENT
Start: 2018-12-30 | End: 2018-12-30 | Stop reason: HOSPADM

## 2018-12-30 RX ORDER — HYDRALAZINE HYDROCHLORIDE 10 MG/1
10 TABLET, FILM COATED ORAL
Status: DISCONTINUED | OUTPATIENT
Start: 2018-12-30 | End: 2018-12-30 | Stop reason: HOSPADM

## 2018-12-30 RX ORDER — ACETAMINOPHEN 650 MG/1
650 SUPPOSITORY RECTAL
Status: DISCONTINUED | OUTPATIENT
Start: 2018-12-30 | End: 2018-12-30 | Stop reason: HOSPADM

## 2018-12-30 RX ORDER — AMLODIPINE BESYLATE 5 MG/1
5 TABLET ORAL DAILY
Status: DISCONTINUED | OUTPATIENT
Start: 2018-12-30 | End: 2018-12-30 | Stop reason: HOSPADM

## 2018-12-30 RX ORDER — LISINOPRIL 20 MG/1
20 TABLET ORAL DAILY
Status: DISCONTINUED | OUTPATIENT
Start: 2018-12-30 | End: 2018-12-30 | Stop reason: HOSPADM

## 2018-12-30 RX ORDER — DEXTROSE 50 % IN WATER (D50W) INTRAVENOUS SYRINGE
12.5-25 AS NEEDED
Status: DISCONTINUED | OUTPATIENT
Start: 2018-12-30 | End: 2018-12-30 | Stop reason: HOSPADM

## 2018-12-30 RX ORDER — SODIUM CHLORIDE 0.9 % (FLUSH) 0.9 %
10 SYRINGE (ML) INJECTION 2 TIMES DAILY
Status: DISCONTINUED | OUTPATIENT
Start: 2018-12-30 | End: 2018-12-30 | Stop reason: HOSPADM

## 2018-12-30 RX ORDER — POTASSIUM CHLORIDE 750 MG/1
40 TABLET, FILM COATED, EXTENDED RELEASE ORAL
Status: COMPLETED | OUTPATIENT
Start: 2018-12-30 | End: 2018-12-30

## 2018-12-30 RX ORDER — NALOXONE HYDROCHLORIDE 0.4 MG/ML
0.4 INJECTION, SOLUTION INTRAMUSCULAR; INTRAVENOUS; SUBCUTANEOUS AS NEEDED
Status: DISCONTINUED | OUTPATIENT
Start: 2018-12-30 | End: 2018-12-30 | Stop reason: HOSPADM

## 2018-12-30 RX ORDER — MAGNESIUM SULFATE 100 %
4 CRYSTALS MISCELLANEOUS AS NEEDED
Status: DISCONTINUED | OUTPATIENT
Start: 2018-12-30 | End: 2018-12-30 | Stop reason: HOSPADM

## 2018-12-30 RX ORDER — ACETAMINOPHEN 325 MG/1
650 TABLET ORAL
Status: DISCONTINUED | OUTPATIENT
Start: 2018-12-30 | End: 2018-12-30 | Stop reason: HOSPADM

## 2018-12-30 RX ORDER — OXYCODONE AND ACETAMINOPHEN 5; 325 MG/1; MG/1
1 TABLET ORAL
Status: DISCONTINUED | OUTPATIENT
Start: 2018-12-30 | End: 2018-12-30 | Stop reason: HOSPADM

## 2018-12-30 RX ORDER — CHLORTHALIDONE 25 MG/1
25 TABLET ORAL DAILY
Status: DISCONTINUED | OUTPATIENT
Start: 2018-12-30 | End: 2018-12-30 | Stop reason: HOSPADM

## 2018-12-30 RX ORDER — SODIUM CHLORIDE 0.9 % (FLUSH) 0.9 %
10 SYRINGE (ML) INJECTION AS NEEDED
Status: DISCONTINUED | OUTPATIENT
Start: 2018-12-30 | End: 2018-12-30 | Stop reason: HOSPADM

## 2018-12-30 RX ADMIN — AMLODIPINE BESYLATE 5 MG: 5 TABLET ORAL at 08:55

## 2018-12-30 RX ADMIN — LISINOPRIL 20 MG: 20 TABLET ORAL at 08:56

## 2018-12-30 RX ADMIN — CHLORTHALIDONE 25 MG: 25 TABLET ORAL at 08:55

## 2018-12-30 RX ADMIN — ATORVASTATIN CALCIUM 20 MG: 20 TABLET, FILM COATED ORAL at 08:55

## 2018-12-30 RX ADMIN — Medication 10 ML: at 06:22

## 2018-12-30 RX ADMIN — HYDRALAZINE HYDROCHLORIDE 10 MG: 10 TABLET, FILM COATED ORAL at 02:38

## 2018-12-30 RX ADMIN — FENTANYL CITRATE 25 MCG: 50 INJECTION, SOLUTION INTRAMUSCULAR; INTRAVENOUS at 02:14

## 2018-12-30 RX ADMIN — POTASSIUM CHLORIDE 40 MEQ: 750 TABLET, EXTENDED RELEASE ORAL at 02:38

## 2018-12-30 RX ADMIN — METOPROLOL TARTRATE 50 MG: 50 TABLET ORAL at 08:56

## 2018-12-30 NOTE — PROGRESS NOTES
9:58 PM 
Attempted report on pt Manual [place], nurse to call back. [address] TRANSFER - IN REPORT: 
 
Verbal report received from [place] at [facility] (name) on Esthela Muñoz  being received from [facility] (unit) for routine progression of care Report consisted of patients Situation, Background, Assessment and  
Recommendations(SBAR). Information from the following report(s) SBAR, Kardex and Accordion was reviewed with the receiving nurse. Opportunity for questions and clarification was provided. Assessment completed upon patients arrival to unit and care assumed. 0015 Pt arrived to floor, vitals taken, placed on cardiac monitor, family at bedside, call bell in reach, no needs at this time. 36 Paged admitting  Let them know pt is on floor. 0100 paged Dr. Adia Lora, informed Dr. Camille Martinez would be coming up to assess this pt. Pt BP is currently high attempting to obtain orders.

## 2018-12-30 NOTE — PROGRESS NOTES
Reason for Admission: The patient presented secondary to fall at home and found to have right frontal subarachnoid and subdural hemorrhage RRAT Score: 10 Plan for utilizing home health:  TBD- the patient has PT/OT evaluations pending Likelihood of Readmission: Low Transition of Care Plan: TBD The CM met with patient and spouse at bedside in order to introduce the role of CM and assess for patient needs. The patient lives at home with her - verified demographics and insurance information. The patient endorses being independent with ADLS and mobility prior to hospitalization, and denied use of DME in the home. the patient endorses that family will provide transportation home upon discharge, and denied difficulty affording or accessing medications prior to hospitalization. The patient has PT/OT evaluations pending, CM will await outcome of evaluations to assist in determining needs upon discharge. MERISSA Garza Care Management Interventions PCP Verified by CM: Yes(Patient verified PCP as Dr. Mell Pedraza ) Mode of Transport at Discharge: Other (see comment)(Patient stated family will transport home upon discharge) Transition of Care Consult (CM Consult): Discharge Planning MyChart Signup: No 
Discharge Durable Medical Equipment: No 
Health Maintenance Reviewed: Yes Physical Therapy Consult: Yes Occupational Therapy Consult: Yes Speech Therapy Consult: No 
Current Support Network: Lives with Spouse, Own Home(Patient lives with spouse who is present at bedside) Confirm Follow Up Transport: Family Plan discussed with Pt/Family/Caregiver: Yes [employer] Information Provided?: No 
Discharge Location Discharge Placement: Home(CM will await PT/OT evaluations )

## 2018-12-30 NOTE — PROGRESS NOTES
Problem: Falls - Risk of 
Goal: *Absence of Falls Document Isaac Lazo Fall Risk and appropriate interventions in the flowsheet. Outcome: Progressing Towards Goal 
Fall Risk Interventions: 
  
 
  
 
Medication Interventions: Patient to call before getting OOB History of Falls Interventions: Door open when patient unattended

## 2018-12-30 NOTE — PROGRESS NOTES
No complaints 
afeb VSS Alert, oriented Fluent speech GALLAGHER 
S/p GLF Repeat head CT pending If no change on repeat head CT can be d/c'ed home with follow up with me in 2-4 weeks Hold plavix

## 2018-12-30 NOTE — PROGRESS NOTES
Stroke Education documented in Patient Education: YES Core Measures Documented in [facility]: 
Risk Factors: YES Warning signs of stroke: YES When to Activate 911: YES Medication Education for Risk Factors: YES Smoking cessation if applicable: YES Written Education Given:  Otoniel Most Discharge NIH Completed: YES Score: 0 BRAINS: YES Follow Up Appointment Made: NO  
Date/Time if applicable: (Office closed on Sunday, phone # provided to patient.) Bedside RN performed patient education and medication education. Discharge concerns initiated and discussed with patient, including clarification on \"who\" assists the patient at their home and instructions for when the home going patient should call their provider after discharge. Opportunity for questions and clarification was provided. Patient receptive to education: YES Patient stated: \"I will call tomorrow to follow up with my physicians. \" 
Barriers to Education: None Diagnosis Education given:  YES Length of stay: 1 Expected Day of Discharge: 12/30/2018 Ask if they have \"Help at Home\" & add to white board? YES Education Day #: 1 Medication Education Given:  YES 
M in the box Medication name: Atorvastatin Pt aware of HCAHPS survey: YES If you experience chest pain call 911. Do not drive yourself. Discharge medications reviewed with patient and spouse and appropriate educational materials and side effects teaching were provided. I have reviewed discharge instructions with the patient and spouse. The patient and spouse verbalized understanding.

## 2018-12-30 NOTE — ED PROVIDER NOTES
EMERGENCY DEPARTMENT HISTORY AND PHYSICAL EXAM 
 
Date: 12/29/2018 Patient Name: Sylvester Boateng History of Presenting Illness Chief Complaint Patient presents with  
 Hand Pain Ambulatory into the ED with c/o Rt hand pain/laceration s/p GLF d/t tripping in a parking lot pta. She hit her head but did not pass out-reports taking \"blood thinners\" History Provided By: Patient and Patient's  HPI: Sylvester Boateng is a 82 y.o. female with a PMH of hypertension, hyperlipidemia and CAD who presents with cc of right hand pain after pt had a fall from tripping over a large pothole. Pt states she tried to catch herself before falling by putting out her hand. Pt states when she tripped she hit her head but denies any LOC. Pt's  was with her during the event She specifically denies any ams, confusion,  fevers, chills, nausea, vomiting, chest pain, shortness of breath, headache, rash, diarrhea, sweating or weight loss. All other ROS negative at this time Pt is in no acute distress and is speaking in full sentences PCP: Сергей García MD 
 
 
 
Past History Past Medical History: 
Past Medical History:  
Diagnosis Date  CAD (coronary artery disease) 9/16/2009 EF=65-70%; Mild-mod MR; Dr Bhatia Ar  Diabetes ([facility] [address] 9/16/2009  
 boarder line controlle by diet and exercise.  Hyperlipidemia 9/16/2009  Hypertension 9/16/2009 Past Surgical History: 
Past Surgical History:  
Procedure Laterality Date  CARDIAC SURG PROCEDURE UNLIST  2008  
 cardiac stent  HX ORTHOPAEDIC  10/2013  
 right ankle  HX ORTHOPAEDIC  2/20/14 INCISION AND DRAINAGE RIGHT ANKLE AND HARDWARE REMOVAL Family History: 
Family History Problem Relation Age of Onset  Hypertension Mother  Heart Disease Mother  Hypertension Father  Heart Disease Father Social History: 
Social History Tobacco Use  Smoking status: Never Smoker  Smokeless tobacco: Never Used Substance Use Topics  Alcohol use: No  
 Drug use: No  
 
 
Allergies: Allergies Allergen Reactions  Crestor [Rosuvastatin] Unknown (comments) Review of Systems Review of Systems Constitutional: Negative. Negative for chills and fever. HENT: Negative. Eyes: Negative. Respiratory: Negative. Negative for shortness of breath. Cardiovascular: Negative. Negative for chest pain. Gastrointestinal: Negative. Negative for abdominal pain, diarrhea, nausea and vomiting. Endocrine: Negative. Genitourinary: Negative. Musculoskeletal: Positive for arthralgias. Skin: Positive for wound. Allergic/Immunologic: Negative. Neurological: Negative. Negative for dizziness, facial asymmetry, light-headedness, numbness and headaches. Hematological: Negative. Psychiatric/Behavioral: Negative. Negative for confusion. All other systems reviewed and are negative. Physical Exam  
 
Vitals:  
 12/29/18 1541 12/29/18 2051 BP: (!) 192/94 161/84 Pulse: 78 77 Resp: 16 18 Temp: 98.1 °F (36.7 °C) SpO2: 99% 99% Weight: 73.9 kg (163 lb) Height: 5' 7\" (1.702 m) Physical Exam  
Constitutional: She is oriented to person, place, and time. She appears well-developed and well-nourished. No distress. HENT:  
Head: Normocephalic and atraumatic. Right Ear: External ear normal.  
Left Ear: External ear normal.  
Nose: Nose normal.  
Mouth/Throat: Oropharynx is clear and moist. No oropharyngeal exudate. Eyes: Conjunctivae and EOM are normal. Pupils are equal, round, and reactive to light. Neck: Normal range of motion. Neck supple. No tracheal deviation present. Cardiovascular: Normal rate, regular rhythm, normal heart sounds and intact distal pulses. Pulmonary/Chest: Effort normal and breath sounds normal. No respiratory distress. She has no wheezes. Abdominal: Soft. Bowel sounds are normal. She exhibits no distension. There is no tenderness. There is no rebound, no CVA tenderness, no tenderness at McBurney's point and negative Rios's sign. Musculoskeletal: She exhibits no edema, tenderness or deformity. Right wrist: Normal. She exhibits normal range of motion, no tenderness, no bony tenderness, no swelling, no effusion, no crepitus, no deformity and no laceration. Left wrist: Normal. She exhibits normal range of motion, no tenderness, no bony tenderness, no swelling, no effusion, no crepitus, no deformity and no laceration. Right hand: She exhibits laceration. She exhibits normal range of motion, no tenderness, no bony tenderness, normal two-point discrimination, normal capillary refill, no deformity and no swelling. Normal sensation noted. Decreased sensation is not present in the ulnar distribution, is not present in the medial distribution and is not present in the radial distribution. Normal strength noted. She exhibits no finger abduction, no thumb/finger opposition and no wrist extension trouble. Left hand: Normal. She exhibits normal range of motion. Normal sensation noted. Normal strength noted. Hands: 
Lymphadenopathy:  
  She has no cervical adenopathy. Neurological: She is alert and oriented to person, place, and time. She has normal reflexes. She is not disoriented. She displays no atrophy, no tremor and normal reflexes. No cranial nerve deficit. She exhibits normal muscle tone. She displays no seizure activity. Coordination and gait normal.  
Intact finger to nose, no pronator drift Skin: Skin is warm and dry. She is not diaphoretic. No pallor. Psychiatric: She has a normal mood and affect. Her behavior is normal. Judgment and thought content normal.  
Nursing note and vitals reviewed. Diagnostic Study Results Labs - Recent Results (from the past 12 hour(s)) CBC WITH AUTOMATED DIFF Collection Time: 12/29/18 10:10 PM  
Result Value Ref Range WBC 10.3 3.6 - 11.0 K/uL  
 RBC 4.55 3.80 - 5.20 M/uL  
 HGB 14.1 11.5 - 16.0 g/dL HCT 41.9 35.0 - 47.0 % MCV 92.1 80.0 - 99.0 FL  
 MCH 31.0 26.0 - 34.0 PG  
 MCHC 33.7 30.0 - 36.5 g/dL  
 RDW 13.6 11.5 - 14.5 % PLATELET 975 165 - 130 K/uL MPV 10.9 8.9 - 12.9 FL  
 NRBC 0.0 0  WBC ABSOLUTE NRBC 0.00 0.00 - 0.01 K/uL NEUTROPHILS 67 32 - 75 % LYMPHOCYTES 24 12 - 49 % MONOCYTES 7 5 - 13 % EOSINOPHILS 1 0 - 7 % BASOPHILS 0 0 - 1 % IMMATURE GRANULOCYTES 0 0.0 - 0.5 % ABS. NEUTROPHILS 7.0 1.8 - 8.0 K/UL  
 ABS. LYMPHOCYTES 2.4 0.8 - 3.5 K/UL  
 ABS. MONOCYTES 0.7 0.0 - 1.0 K/UL  
 ABS. EOSINOPHILS 0.1 0.0 - 0.4 K/UL  
 ABS. BASOPHILS 0.0 0.0 - 0.1 K/UL  
 ABS. IMM. GRANS. 0.0 0.00 - 0.04 K/UL  
 DF AUTOMATED PROTHROMBIN TIME + INR Collection Time: 12/29/18 10:10 PM  
Result Value Ref Range INR 1.1 0.9 - 1.1 Prothrombin time 11.1 9.0 - 11.1 sec METABOLIC PANEL, COMPREHENSIVE Collection Time: 12/29/18 10:10 PM  
Result Value Ref Range Sodium 139 136 - 145 mmol/L Potassium 3.3 (L) 3.5 - 5.1 mmol/L Chloride 100 97 - 108 mmol/L  
 CO2 30 21 - 32 mmol/L Anion gap 9 5 - 15 mmol/L Glucose 95 65 - 100 mg/dL BUN 13 6 - 20 MG/DL Creatinine 0.69 0.55 - 1.02 MG/DL  
 BUN/Creatinine ratio 19 12 - 20 GFR est AA >60 >60 ml/min/1.73m2 GFR est non-AA >60 >60 ml/min/1.73m2 Calcium 9.3 8.5 - 10.1 MG/DL Bilirubin, total 0.5 0.2 - 1.0 MG/DL  
 ALT (SGPT) 27 12 - 78 U/L  
 AST (SGOT) 23 15 - 37 U/L Alk. phosphatase 112 45 - 117 U/L Protein, total 8.0 6.4 - 8.2 g/dL Albumin 4.0 3.5 - 5.0 g/dL Globulin 4.0 2.0 - 4.0 g/dL A-G Ratio 1.0 (L) 1.1 - 2.2 SAMPLES BEING HELD Collection Time: 12/29/18 10:10 PM  
Result Value Ref Range SAMPLES BEING HELD 1RED COMMENT Add-on orders for these samples will be processed based on acceptable specimen integrity and analyte stability, which may vary by analyte. Radiologic Studies -  
CT HEAD WO CONT Final Result IMPRESSION: Small focus of right parietal subdural or subarachnoid hemorrhage. XR HAND LT MIN 3 V Final Result IMPRESSION: No fracture or acute abnormality. CT Results  (Last 48 hours) 12/29/18 2019  CT HEAD WO CONT Final result Impression:  IMPRESSION: Small focus of right parietal subdural or subarachnoid hemorrhage. Narrative:  EXAM:  CT HEAD WITHOUT CONTRAST INDICATION: Lane Aj and hit head on blood thinners. COMPARISON: None. CONTRAST: None. TECHNIQUE: Unenhanced CT of the head was performed using 5 mm images. Brain and  
bone windows were generated. Sagittal and coronal reformations were generated. CT dose reduction was achieved through use of a standardized protocol tailored  
for this examination and automatic exposure control for dose modulation. FINDINGS:  
The ventricles and sulci are normal in size, shape and configuration and  
midline. There is no significant white matter disease. There is a small focus of subdural or subarachnoid hemorrhage in the right  
parietal region. There is no extra-axial collection, mass, mass effect or midline shift. The basilar cisterns are open. No acute infarct is identified. The bone windows demonstrate no abnormalities. The visualized portions of the paranasal sinuses and mastoid air cells are  
clear. CXR Results  (Last 48 hours) None Medical Decision Making I am the first provider for this patient. I reviewed the vital signs, available nursing notes, past medical history, past surgical history, family history and social history. Vital Signs-Reviewed the patient's vital signs. Records Reviewed: Nursing Notes, Old Medical Records, Previous Radiology Studies and Previous Laboratory Studies Disposition: 
Discharge DISCHARGE NOTE:  
 Care plan outlined and precautions discussed. Patient has no new complaints, changes, or physical findings. Results of visit were reviewed with the patient. All medications were reviewed with the patient; will d/c home. All of pt's questions and concerns were addressed. Patient was instructed and agrees to follow up with pcp, as well as to return to the ED upon further deterioration. Patient is ready to go home. Follow-up Information Follow up With Specialties Details Why Contact Info Bay Child MD Internal Medicine Schedule an appointment as soon as possible for a visit in 3 days If symptoms worsen [address] [facility] [address] [facility] 
[phone] Discharge Medication List as of 12/29/2018  8:42 PM  
  
START taking these medications Details  
cephALEXin (KEFLEX) 500 mg capsule Take 1 Cap by mouth three (3) times daily for 7 days. , Normal, Disp-21 Cap, R-0  
  
  
CONTINUE these medications which have NOT CHANGED Details  
clopidogrel (PLAVIX) 75 mg tab TAKE 1 TABLET BY MOUTH EVERY DAY, Normal, Disp-90 Tab, R-3  
  
atorvastatin (LIPITOR) 20 mg tablet TAKE 1 TABLET BY MOUTH EVERY NIGHT AT BEDTIME, Normal, Disp-90 Tab, R-2  
  
metoprolol tartrate (LOPRESSOR) 50 mg tablet TAKE 1 TABLET BY MOUTH TWICE DAILY, Normal, Disp-180 Tab, R-1  
  
chlorthalidone (HYGROTEN) 25 mg tablet TAKE 1 TABLET BY MOUTH DAILY., Normal, Disp-90 Tab, R-1  
  
amLODIPine-benazepril (LOTREL) 5-20 mg per capsule TAKE 1 CAPSULE BY MOUTH EVERY DAY, Normal, Disp-90 Cap, R-0  
  
oxyCODONE-acetaminophen (PERCOCET) 5-325 mg per tablet Take 1 Tab by mouth every eight (8) hours as needed for Pain.  Max Daily Amount: 3 Tabs., Print, Disp-15 Tab, R-0  
  
allopurinol (ZYLOPRIM) 100 mg tablet TAKE 1 TABLET BY MOUTH DAILY, Normal**Patient requests 90 days supply**Disp-90 Tab, R-3  
  
MULTIVITAMIN PO Take  by mouth daily as needed., Historical Med  
  
 diphenhydrAMINE (BENADRYL) 25 mg capsule Take 25 mg by mouth every six (6) hours as needed., Historical Med Aspirin, Buffered 81 mg tab Take  by mouth daily. , Historical Med Blood-Glucose Meter monitoring kit Check blood sugar twice weekly. , Normal, Disp-1 Kit, R-0  
  
lancets 32 gauge misc Check blood sugar twice weekly. , Normal, Disp-100 Lancet, R-0  
  
glucose blood VI test strips (BLOOD GLUCOSE TEST) strip Check blood sugar twice weekly. , Normal, Disp-100 Strip, R-0  
  
predniSONE (DELTASONE) 20 mg tablet 60mg po daily for 2days, 40mg po daily for 2 days, 20mg po daily for 2days then stop, Normal, Disp-12 Tab, R-0  
  
predniSONE (STERAPRED DS) 10 mg dose pack Per Dose Pack instructions, Normal, Disp-21 Tab, R-0 Provider Notes (Medical Decision Making): DDX: contusion, fracture, laceration, abrasion, TBI Due to pts age and on plavix will get CT head Consult Note: 
9:21 PM 
MARYAM Lea spoke with Dr. Kayce Graham Specialty: Neurosurgey Discussed pt's hx, disposition, and available diagnostic and imaging results. Consultant advised transfer to [facility] to neuro step down for Q4 hour neuro  checks, repeat ct in the am. Admit to hospitalist.   
9:36 PM 
[address] at [facility] accepted the pt Procedures: 
Procedures Procedure Note - Laceration Repair: 
7:56 PM 
Procedure by MARYAM Lea Complexity: simple 4.5cm curved laceration to hand  was irrigated copiously with NS under jet lavage, prepped with Betadine and choloprep and draped in a sterile fashion. The area was anesthetized with 10 mLs of  Lidocaine 1% without epinephrine via local infiltration. The wound was explored with the following results: No foreign bodies found, No tendon laceration seen. The wound was repaired with One layer suture closure: Skin Layer:  6 sutures placed, stitch type:simple interrupted, suture: 3-0 nylon. .  The wound was closed with good hemostasis and approximation. Sterile dressing applied. Estimated blood loss: minimal  
The procedure took 1-15 minutes, and pt tolerated well. Diagnosis Clinical Impression: 1. Laceration of right hand without foreign body, initial encounter 2. Fall, initial encounter 3. Closed head injury, initial encounter 4. Subdural hematoma (HCC)

## 2018-12-30 NOTE — CONSULTS
[address]    [place] Toure  MR#: [id number]  : 1935  ACCOUNT #: [de-identified]   DATE OF SERVICE: 2018    REASON FOR CONSULTATION:  Subarachnoid and subdural hemorrhage. HISTORY OF PRESENT ILLNESS:  This is an 57-year-old woman who had a fall yesterday evening, tripped in a parking lot in a pothole. She fell down onto her right hand lacerating her right hand and hitting the right side of her head. There was no loss of consciousness. She also fell on her right knee and had some bruising and an abrasion to that. She was taken to the emergency room at [facility] where her laceration on the right hand was sutured and she had a head CT. Head CT revealed a small amount of right frontal subarachnoid and subdural hemorrhage without any significant mass effect. She does take Plavix. She was admitted overnight for observation. Neurologically, she has been intact. Her repeat head CT is pending. Regarding her Plavix she has been taking this for 12 years. She had a stent placed 12 years ago and has not had any history of stroke. PAST MEDICAL HISTORY:  Coronary artery disease, hypertension, borderline diabetes, hyperlipidemia. PAST SURGICAL HISTORY:  Cardiac stent placed 10 years ago, right ankle surgery . Incision and drainage of right ankle postoperatively, several months after initial surgery. SOCIAL HISTORY:  She is . No alcohol or tobacco use. FAMILY HISTORY:  Positive for coronary artery disease, hypertension. ALLERGIES:  CRESTOR. MEDICATIONS:  Norvasc 5 mg p.o. every day, Lipitor 20 mg every day, chlorthalidone 25 mg every day, lisinopril 20 mg every day, Lopressor 50 mg b.i.d. Of note, Plavix is being held. REVIEW OF SYSTEMS:  Denies any headache, vision changes, hearing difficulty, chest pain, shortness of breath, abdominal pain, urinary dysfunction or muscle spasms, skin eruption.     PHYSICAL EXAMINATION:  VITAL SIGNS:  Temperature 98.2, blood pressure 122/49, pulse 69. GENERAL:  This is a well-developed, well-nourished woman who is examined sitting in the chair. She is alert. NEUROLOGIC:  She is oriented x3, normal affect, fluent speech. Conjugate gaze. Symmetric face. She has no pronator drift. She has good strength in upper extremities. Right hand is bandaged. She does have bruising around her right knee, but has good strength in bilateral lower extremities. She has not ambulated. She has a negative Vivi sign. No clonus. No cervical tenderness to palpation. IMAGING STUDIES:  Head CT as detailed in history of present illness. ASSESSMENT AND PLAN:  This is an 82-year-old woman who is on Plavix and had a mechanical fall yesterday. She has a very small amount of hemorrhage on her initial head CT; however, given that she was on Plavix, we will repeat her head CT. If that is unchanged she will be discharged home. She will follow up with me in 2-4 weeks. At this point, we will keep her off her Plavix and wondering if she needs that continued given that she has not had any recent stent placement or history of stroke. We will discuss this further when she sees me in followup. Thank you for this consultation.       MD KEATON Soriano / Libby Cates  D: 12/30/2018 11:40     T: 12/30/2018 11:51  JOB #: [id number]

## 2018-12-30 NOTE — DISCHARGE INSTRUCTIONS
need to be removed in 10-14 days          Cuts on the Hand Closed With Stitches: Care Instructions  Your Care Instructions    A cut on your hand can be on your fingers, your thumb, or the front or back of your hand. Sometimes a cut can injure the tendons, blood vessels, or nerves of your hand. The doctor used stitches to close the cut. Using stitches also helps the cut heal and reduces scarring. The doctor may have given you a splint to help prevent you from moving your hand, fingers, or thumb. If the cut went deep and through the skin, the doctor put in two layers of stitches. The deeper layer brings the deep part of the cut together. These stitches will dissolve and don't need to be removed. The stitches in the upper layer are the ones you see on the cut. You will probably have a bandage. You will need to have the stitches removed, usually in 7 to 14 days. The doctor may suggest that you see a hand specialist if the cut is very deep or if you have trouble moving your fingers or have less feeling in your hand. The doctor has checked you carefully, but problems can develop later. If you notice any problems or new symptoms, get medical treatment right away. Follow-up care is a key part of your treatment and safety. Be sure to make and go to all appointments, and call your doctor if you are having problems. It's also a good idea to know your test results and keep a list of the medicines you take. How can you care for yourself at home? · Keep the cut dry for the first 24 to 48 hours. After this, you can shower if your doctor okays it. Pat the cut dry. · Don't soak the cut, such as in a bathtub. Your doctor will tell you when it's safe to get the cut wet. · If your doctor told you how to care for your cut, follow your doctor's instructions. If you did not get instructions, follow this general advice:  ? After the first 24 to 48 hours, wash around the cut with clean water 2 times a day.  Don't use hydrogen peroxide or alcohol, which can slow healing. ? You may cover the cut with a thin layer of petroleum jelly, such as Vaseline, and a nonstick bandage. ? Apply more petroleum jelly and replace the bandage as needed. · Prop up the sore hand on a pillow anytime you sit or lie down during the next 3 days. Try to keep it above the level of your heart. This will help reduce swelling. · Avoid any activity that could cause your cut to reopen. · Do not remove the stitches on your own. Your doctor will tell you when to come back to have the stitches removed. · Be safe with medicines. Take pain medicines exactly as directed. ? If the doctor gave you a prescription medicine for pain, take it as prescribed. ? If you are not taking a prescription pain medicine, ask your doctor if you can take an over-the-counter medicine. When should you call for help? Call your doctor now or seek immediate medical care if:    · You have new pain, or your pain gets worse.     · The skin near the cut is cold or pale or changes color.     · You have tingling, weakness, or numbness near the cut.     · The cut starts to bleed, and blood soaks through the bandage. Oozing small amounts of blood is normal.     · You have trouble moving the area of the hand near the cut.     · You have symptoms of infection, such as:  ? Increased pain, swelling, warmth, or redness around the cut.  ? Red streaks leading from the cut.  ? Pus draining from the cut.  ? A fever.    Watch closely for changes in your health, and be sure to contact your doctor if:    · You do not get better as expected. Where can you learn more? Go to http://kailyn-yogesh.info/. Enter T250 in the search box to learn more about \"Cuts on the Hand Closed With Stitches: Care Instructions. \"  Current as of: November 20, 2017  Content Version: 11.8  © 9840-5393 [employer].  Care instructions adapted under license by [employer] (which disclaims liability or warranty for this information). If you have questions about a medical condition or this instruction, always ask your healthcare professional. [place] [address] any warranty or liability for your use of this information.

## 2018-12-30 NOTE — PROGRESS NOTES
Problem: Mobility Impaired (Adult and Pediatric) Goal: *Acute Goals and Plan of Care (Insert Text) Physical Therapy Goals Initiated 12/30/2018 1. Patient will ambulate with independence for 400 feet with the least restrictive device within 7 day(s). 2.  Patient will ascend/descend 12 stairs with 1 handrail(s) with modified independence within 7 day(s). physical Therapy EVALUATION Patient: Loc Caldwell (59 y.o. female) Date: 12/30/2018 Primary Diagnosis: small SAH Subdural hematoma (HCC) Precautions: standard ASSESSMENT : 
Based on the objective data described below, the patient presents with ability to ambulate 300 feet with supervision c/o pain and stiffness in right knee s/p fall prior to hospitalization. Patient able to negotiate 8 steps with 1 rail with supervision. Patient is close to baseline will follow 1-2 visits to ensure safety. She is safe to return home at discharge with assist as needed with mobility. Do not anticipate any therapy needs at discharge. Patient will benefit from skilled intervention to address the above impairments. Patients rehabilitation potential is considered to be Good Factors which may influence rehabilitation potential include:  
[x]         None noted 
[]         Mental ability/status []         Medical condition 
[]         Home/family situation and support systems 
[]         Safety awareness 
[]         Pain tolerance/management 
[]         Other: PLAN : 
Recommendations and Planned Interventions: 
[]           Bed Mobility Training             []    Neuromuscular Re-Education []           Transfer Training                   []    Orthotic/Prosthetic Training 
[x]           Gait Training                         []    Modalities []           Therapeutic Exercises           []    Edema Management/Control 
[]           Therapeutic Activities            []    Patient and Family Training/Education 
[]           Other (comment): 
 
 Frequency/Duration: Patient will be followed by physical therapy  2 times a week to address goals. Discharge Recommendations: To Be Determined and None Further Equipment Recommendations for Discharge:none SUBJECTIVE:  
Patient stated My knee hurts a little when I walk.  OBJECTIVE DATA SUMMARY:  
HISTORY:   
Past Medical History:  
Diagnosis Date  CAD (coronary artery disease) 9/16/2009 EF=65-70%; Mild-mod MR; Dr Adelina Matute  Diabetes ([facility] 9/16/2009  
 boarder line controlle by diet and exercise.  Hyperlipidemia 9/16/2009  Hypertension 9/16/2009 Past Surgical History:  
Procedure Laterality Date  CARDIAC SURG PROCEDURE UNLIST  2008  
 cardiac stent  HX ORTHOPAEDIC  10/2013  
 right ankle  HX ORTHOPAEDIC  2/20/14 INCISION AND DRAINAGE RIGHT ANKLE AND HARDWARE REMOVAL Prior Level of Function/Home Situation: Independent without assistive device Personal factors and/or comorbidities impacting plan of care:  
 
Home Situation Home Environment: Private residence # Steps to Enter: 1 Rails to Enter: Yes One/Two Story Residence: Two story # of Interior Steps: 12 Living Alone: No 
Support Systems: Family member(s) Patient Expects to be Discharged to[de-identified] Private residence Current DME Used/Available at Home: None EXAMINATION/PRESENTATION/DECISION MAKING: Critical Behavior: 
Neurologic State: Alert Orientation Level: Oriented X4 Cognition: Appropriate safety awareness, Follows commands Range Of Motion: 
AROM: Within functional limits PROM: Within functional limits Strength:   
Strength: Generally decreased, functional 
  
 Tone & Sensation:  
Tone: Normal 
  
 Coordination: 
Coordination: Generally decreased, functional 
Functional Mobility: 
Bed Mobility: 
Rolling: Independent Supine to Sit: Independent Sit to Supine: Independent Scooting: Independent Transfers: 
Sit to Stand: Supervision Stand to Sit: Supervision Balance:  
Sitting: Intact Standing: IntactAmbulation/Gait Training:Distance (ft): 300 Feet (ft) Assistive Device: Gait belt Ambulation - Level of Assistance: Supervision Gait Abnormalities: Antalgic Stance: Right decreased Speed/Kallie: Slow Step Length: Left shortened;Right shortened Stairs: 
Number of Stairs Trained: 5 Stairs - Level of Assistance: Supervision Rail Use: Right Functional Measure: 
Corrales Balance Test: 
 
Sitting to Standing: 3 Standing Unsupported: 4 Sitting with Back Unsupported: 4 Standing to Sitting: 3 Transfers: 4 Standing Unsupported with Eyes Closed: 3 Standing Unsupported with Feet Together: 3 Reach Forward with Outstretched Arm: 3  Object: 3 Turn to Look Over Shoulders: 3 Turn 360 Degrees: 3 Alternate Foot on Step/Stool: 2 Standing Unsupported One Foot in Front: 3 Stand on One Le Total: 43 
 
 
 
56=Maximum possible score;  
0-20=High fall risk 21-40=Moderate fall risk 41-56=Low fall risk Dow Balance Test and G-code impairment scale: 
Percentage of Impairment CH 
 
0% 
 CI 
 
1-19% CJ 
 
20-39% CK 
 
40-59% CL 
 
60-79% CM 
 
80-99% CN  
 
100% Corrales Score 0-56 56 45-55 34-44 23-33 12-22 1-11 0  
 
 
G codes: In compliance with CMSs Claims Based Outcome Reporting, the following G-code set was chosen for this patient based on their primary functional limitation being treated: The outcome measure chosen to determine the severity of the functional limitation was the Corrales with a score of 43/56 which was correlated with the impairment scale. ? Mobility - Walking and Moving Around:  
  - CURRENT STATUS: CJ - 20%-39% impaired, limited or restricted  - GOAL STATUS: CI - 1%-19% impaired, limited or restricted  - D/C STATUS:  ---------------To be determined---------------  
  
 
 
Pain: 
Pain Scale 1: Numeric (0 - 10) Pain Intensity 1: number not given when asked Pain Location 1:right knee Activity Tolerance: Please refer to the flowsheet for vital signs taken during this treatment. After treatment:  
[x]         Patient left in no apparent distress sitting up in chair 
[]         Patient left in no apparent distress in bed 
[x]         Call bell left within reach [x]         Nursing notified 
[]         Caregiver present 
[]         Bed alarm activated COMMUNICATION/EDUCATION:  
The patients plan of care was discussed with: Registered Nurse. [x]         Fall prevention education was provided and the patient/caregiver indicated understanding. []         Patient/family have participated as able in goal setting and plan of care. [x]         Patient/family agree to work toward stated goals and plan of care. []         Patient understands intent and goals of therapy, but is neutral about his/her participation. []         Patient is unable to participate in goal setting and plan of care.  
 
Thank you for this referral. 
Nick Ramirez, PT

## 2018-12-30 NOTE — DISCHARGE SUMMARY
Discharge Summary       PATIENT ID: Kenyetta Carver  MRN: [id number]   YOB: 1935    DATE OF ADMISSION: 12/30/2018 12:17 AM    DATE OF DISCHARGE: 12/30/2018    PRIMARY CARE PROVIDER: Bean Barron MD     ATTENDING PHYSICIAN: Ulises Mccoy MD   DISCHARGING PROVIDER: Ulises Mccoy MD    To contact this individual call [phone] and ask the  to page. If unavailable ask to be transferred the Adult Hospitalist Department. CONSULTATIONS: None    PROCEDURES/SURGERIES: * No surgery found *    ADMITTING DIAGNOSES & HOSPITAL COURSE:   Per neurosurgery :       ASSESSMENT AND PLAN:  This is an 74-year-old woman who is on Plavix and had a mechanical fall yesterday. She has a very small amount of hemorrhage on her initial head CT; however, given that she was on Plavix, we will repeat her head CT. If that is unchanged she will be discharged home. She will follow up with me in 2-4 weeks. At this point, we will keep her off her Plavix and wondering if she needs that continued given that she has not had any recent stent placement or history of stroke.   We will discuss this further when she sees me in followup.     Thank you for this consultation.        Montrell Sanders MD        f/u ct head neg for intracranial bleed 12/30/2018     Little to adde to above other than lac to rt hand that needs close f/u with MD Ulises Chun MD 12/30/2018 3:46 PM         DISCHARGE DIAGNOSES / PLAN:      1.  as above        PENDING TEST RESULTS:   At the time of discharge the following test results are still pending: na    FOLLOW UP APPOINTMENTS:    Follow-up Information     Follow up With Specialties Details Why Contact Info    Bean Barron MD Internal Medicine In 4 days  [address] [facility]  [address] [facility]      Callum Segura MD Neurosurgery In 3 weeks  [address] RECOMMENDATIONS: na    DIET: Resume previous diet       ACTIVITY: Activity as tolerated    WOUND CARE: na    EQUIPMENT needed: na      DISCHARGE MEDICATIONS:  Current Discharge Medication List      CONTINUE these medications which have NOT CHANGED    Details   atorvastatin (LIPITOR) 20 mg tablet TAKE 1 TABLET BY MOUTH EVERY NIGHT AT BEDTIME  Qty: 90 Tab, Refills: 2      metoprolol tartrate (LOPRESSOR) 50 mg tablet TAKE 1 TABLET BY MOUTH TWICE DAILY  Qty: 180 Tab, Refills: 1      chlorthalidone (HYGROTEN) 25 mg tablet TAKE 1 TABLET BY MOUTH DAILY. Qty: 90 Tab, Refills: 1      amLODIPine-benazepril (LOTREL) 5-20 mg per capsule TAKE 1 CAPSULE BY MOUTH EVERY DAY  Qty: 90 Cap, Refills: 0      allopurinol (ZYLOPRIM) 100 mg tablet TAKE 1 TABLET BY MOUTH DAILY  Qty: 90 Tab, Refills: 3    Comments: **Patient requests 90 days supply**      MULTIVITAMIN PO Take  by mouth daily as needed. diphenhydrAMINE (BENADRYL) 25 mg capsule Take 25 mg by mouth every six (6) hours as needed. Blood-Glucose Meter monitoring kit Check blood sugar twice weekly. Qty: 1 Kit, Refills: 0    Associated Diagnoses: Diabetes mellitus without complication (HCC)      lancets 32 gauge misc Check blood sugar twice weekly. Qty: 100 Lancet, Refills: 0    Associated Diagnoses: Diabetes mellitus without complication (HCC)      glucose blood VI test strips (BLOOD GLUCOSE TEST) strip Check blood sugar twice weekly. Qty: 100 Strip, Refills: 0    Associated Diagnoses: Diabetes mellitus without complication ([address]         STOP taking these medications       clopidogrel (PLAVIX) 75 mg tab Comments:   Reason for Stopping:         oxyCODONE-acetaminophen (PERCOCET) 5-325 mg per tablet Comments:   Reason for Stopping:         Aspirin, Buffered 81 mg tab Comments:   Reason for Stopping:                 NOTIFY YOUR PHYSICIAN FOR ANY OF THE FOLLOWING:   Fever over 101 degrees for 24 hours.    Chest pain, shortness of breath, fever, chills, nausea, vomiting, diarrhea, change in mentation, falling, weakness, bleeding. Severe pain or pain not relieved by medications. Or, any other signs or symptoms that you may have questions about.     DISPOSITION:    Home With:   OT  PT  HH  RN       Long term SNF/Inpatient Rehab   x Independent/assisted living    Hospice    Other:       PATIENT CONDITION AT DISCHARGE:     Functional status    Poor     Deconditioned    x Independent      Cognition   x  Lucid     Forgetful     Dementia      Catheters/lines (plus indication)    Carey     PICC     PEG    x None      Code status    x Full code     DNR      PHYSICAL EXAMINATION AT DISCHARGE:   Refer to Progress Note  Visit Vitals  /48 (BP 1 Location: Right arm, BP Patient Position: At rest)   Pulse 66   Temp 98.2 °F (36.8 °C)   Resp 17   Ht 5' 7\" (1.702 m) Comment: pt stated   Wt 75 kg (165 lb 4.8 oz)   SpO2 100%   BMI 25.89 kg/m²          CHRONIC MEDICAL DIAGNOSES:  Problem List as of 12/30/2018 Date Reviewed: 12/30/2018          Codes Class Noted - Resolved    Diabetes mellitus without complication ([facility] ABBEY-47-BS: E11.9  ICD-9-CM: 250.00  7/12/2016 - Present        Left carotid bruit ICD-10-CM: R09.89  ICD-9-CM: 785.9  1/16/2015 - Present        Closed bimalleolar fracture of right ankle ICD-10-CM: S37.711Z  ICD-9-CM: 824.4  10/18/2013 - Present        Gout ICD-10-CM: M10.9  ICD-9-CM: 274.9  6/11/2013 - Present        Mitral valve disorders(424.0) ICD-10-CM: I05.9  ICD-9-CM: 424.0  5/8/2012 - Present        Mixed hyperlipidemia ICD-10-CM: E78.2  ICD-9-CM: 272.2  3/7/2012 - Present        Essential hypertension, benign ICD-10-CM: I10  ICD-9-CM: 401.1  3/7/2012 - Present        Postsurgical percutaneous transluminal coronary angioplasty status ICD-10-CM: Z98.61  ICD-9-CM: V45.82  3/7/2012 - Present        S/P Stent LAD (LISSA) ICD-10-CM: Z95.9  ICD-9-CM: V45.89  3/7/2012 - Present    Overview Signed 1/9/2014  4:15 PM by Jesica Mueller MD     CYPHER LISSA's to LAD 2007- reason for long-term plavix unless signs of bleeding             Coronary atherosclerosis of native coronary artery ICD-10-CM: I25.10  ICD-9-CM: 414.01  3/7/2012 - Present        Borderline diabetes ICD-10-CM: R73.03  ICD-9-CM: 790.29  3/7/2012 - Present    Overview Signed 3/7/2012 11:43 AM by Janny Huang     Diet Controlled             Hypertension ICD-10-CM: I10  ICD-9-CM: 401.9  9/16/2009 - Present        Hyperlipidemia ICD-10-CM: E78.5  ICD-9-CM: 272.4  9/16/2009 - Present        Diabetes ([address] ICD-10-CM: E11.9  ICD-9-CM: 250.00  9/16/2009 - Present    Overview Signed 4/7/2011  8:30 AM by Tiffanie Valencia MD     Diet controlled, no meds             CAD (coronary artery disease) ICD-10-CM: I25.10  ICD-9-CM: 414.00  9/16/2009 - Present    Overview Signed 1/23/2014  8:44 AM by Tiffanie Valencia MD     [facility]             RESOLVED: Subdural hematoma [facility]) ICD-10-CM: Y87.2O7O  ICD-9-CM: 432.1  12/30/2018 - 12/30/2018              Greater than  20  minutes were spent with the patient on counseling and coordination of care    Signed:   La Lucas MD  12/30/2018  3:44 PM

## 2018-12-30 NOTE — CONSULTS
82yo on plavix s/p GLF today. Head CT with small sah and small SDH without mass effect. Neuro is intact. Will need neuro checks q 4 hours and repeat head CT in am.  Will need BP control with SBP < 140. Will follow with you.

## 2018-12-30 NOTE — H&P
Admission History and Physical 
 
 
NAME:  Phillip Neri :   1935 MRN:  [id number] PCP:  Marlyn Balderas MD  
 
Date/Time:  2018 Assessment/Plan: Active Problems: 
  Subdural hematoma ([address] (2018) #1  Right parietal subdural or subarachnoid hemorrhage #2  mechanical fall #3  CAD on DAPT ( last LISSA in ) #4  HTN #5  Borderline DM #6  HLD #7  Hand laceration #8  Acute pain secondary to #7 #9  PPX Code status Plan: 
-admit to neuro floor, neuro checks Q4 hours, Repeat HCT in am, neurosurgery consulted and they are on board 
-pain control with prn tylenol and prn fentanyl 
-continue BP meds which she takes at home and will add prn hydralazine for SBP above 140  
-will put on [facility] and  
-hold on starting Asa and plavix for now in the setting of head bleeding. She might need to be dc on asa alone if cardiology is okay with that since her last stent was about 10 years ago  
-hold on using chemicals for DVT PPX in the setting for head bleeding 
-she was begging for food so will give a meal of CLD then keep NPO for potential NS procedure in am if the bleeding is worse  
-She will remain full code Thank you for giving us the chance to take care of this pt Subjective: CHIEF COMPLAINT:   
 
HISTORY OF PRESENT ILLNESS:    
Ms. Bobby Madden is a 82 y.o.  female who is admitted with mechanical fall. She has a PMH of CAD ( last stent in ) on DAPT, HTN, borderline DM, and HTN. Ms. Bobby Madden presented to the Emergency Department today complaining of pain in the hand after tripping in the parking lot when she was trying to take care of her handicapped . The pt hit her head and lacerated her right hand. She also fell on right knee and bruised it. She was evaluated in the ED and was found to have hand laceration requiring multiple stitches. Head CT was done and showed: Small focus of right parietal subdural or subarachnoid hemorrhage. Neurosurgery recommended admission and repeating HCT in am. When I saw the pt, she was doing okay but complaining of 9/10 pain in her right hand. She was asking for some pain med. s she denied any other symptoms. Past Medical History:  
Diagnosis Date  CAD (coronary artery disease) 9/16/2009 EF=65-70%; Mild-mod MR; Dr Antunez Jessica  Diabetes ([address] 9/16/2009  
 boarder line controlle by diet and exercise.  Hyperlipidemia 9/16/2009  Hypertension 9/16/2009 Past Surgical History:  
Procedure Laterality Date  CARDIAC SURG PROCEDURE UNLIST  2008  
 cardiac stent  HX ORTHOPAEDIC  10/2013  
 right ankle  HX ORTHOPAEDIC  2/20/14 INCISION AND DRAINAGE RIGHT ANKLE AND HARDWARE REMOVAL Social History Tobacco Use  Smoking status: Never Smoker  Smokeless tobacco: Never Used Substance Use Topics  Alcohol use: No  
  
 
Family History Problem Relation Age of Onset  Hypertension Mother  Heart Disease Mother  Hypertension Father  Heart Disease Father Allergies Allergen Reactions  Crestor [Rosuvastatin] Unknown (comments) Prior to Admission medications Medication Sig Start Date End Date Taking? Authorizing Provider  
clopidogrel (PLAVIX) 75 mg tab TAKE 1 TABLET BY MOUTH EVERY DAY 12/1/18  Yes Douglas Al MD  
atorvastatin (LIPITOR) 20 mg tablet TAKE 1 TABLET BY MOUTH EVERY NIGHT AT BEDTIME 12/1/18  Yes Mitchell Gómez MD  
metoprolol tartrate (LOPRESSOR) 50 mg tablet TAKE 1 TABLET BY MOUTH TWICE DAILY 12/1/18  Yes Mitchell Gómez MD  
chlorthalidone (HYGROTEN) 25 mg tablet TAKE 1 TABLET BY MOUTH DAILY.  12/1/18  Yes Mitchell Gómez MD  
amLODIPine-benazepril (LOTREL) 5-20 mg per capsule TAKE 1 CAPSULE BY MOUTH EVERY DAY 10/16/18  Yes Mitchell Gómez MD  
oxyCODONE-acetaminophen (PERCOCET) 5-325 mg per tablet Take 1 Tab by mouth every eight (8) hours as needed for Pain. Max Daily Amount: 3 Tabs. 9/4/18  Yes Thais Eaton MD  
allopurinol (ZYLOPRIM) 100 mg tablet TAKE 1 TABLET BY MOUTH DAILY 9/4/18  Yes Thais Eaton MD  
MULTIVITAMIN PO Take  by mouth daily as needed. Yes Provider, Historical  
diphenhydrAMINE (BENADRYL) 25 mg capsule Take 25 mg by mouth every six (6) hours as needed. Yes Provider, Historical  
Aspirin, Buffered 81 mg tab Take  by mouth daily. Yes Provider, Historical  
Blood-Glucose Meter monitoring kit Check blood sugar twice weekly. 9/10/18   Thais Eaton MD  
lancets 32 gauge misc Check blood sugar twice weekly. 9/10/18   Thais Eaton MD  
glucose blood VI test strips (BLOOD GLUCOSE TEST) strip Check blood sugar twice weekly. 9/10/18   Thais Eaton MD  
 
 
 
Review of Systems: 
12 were neg except as mentioned in HPI Objective: VITALS:   
Vital signs reviewed; most recent are: 
 
Visit Vitals /62 Pulse 78 Temp 99 °F (37.2 °C) Resp 13 SpO2 97% SpO2 Readings from Last 6 Encounters:  
12/30/18 97% 12/29/18 99% 09/04/18 97% 07/16/18 100% 07/13/18 99% 05/10/18 99% No intake or output data in the 24 hours ending 12/30/18 0145 Exam:  
 
Physical Exam: 
 
Gen:  Well-developed, well-nourished, in no acute distress HEENT:  Pink conjunctivae, PERRL, hearing intact to voice, moist mucous membranes Neck:  Supple Resp:  No accessory muscle use, clear breath sounds without wheezes rales or rhonchi 
Card:  No murmurs, normal S1, S2 without thrills, bruits or peripheral edema, radial pulses 2+ b/l Abd:  Soft, non-tender, non-distended, normoactive bowel sounds are present Lymph:  No cervical adenopathy Musc:  No cyanosis, cap refill < 2 sec Skin:  No rashes or ulcers, skin turgor is good Neuro:  Cranial nerves 3-12 are grossly intact, no focal deficits Psych:  Alert with good insight. Oriented to person, place, and time Ext: right hand wrapped with a white bandage Labs: 
 
Recent Labs  
  12/29/18 
2210 WBC 10.3 HGB 14.1 HCT 41.9  Recent Labs  
  12/29/18 2210   
K 3.3*  
 CO2 30  
GLU 95 BUN 13  
CREA 0.69 CA 9.3 ALB 4.0 TBILI 0.5 SGOT 23 ALT 27 Lab Results Component Value Date/Time Glucose (POC) 81 10/20/2013 12:07 PM  
 Glucose (POC) 81 10/20/2013 08:31 AM  
 
No results for input(s): PH, PCO2, PO2, HCO3, FIO2 in the last 72 hours. Recent Labs  
  12/29/18 2210 INR 1.1 Medical records, labd, and imaging reviewed in preparation for this admission Surrogate decision maker:  patient Total time spent in care of this patient: 30 Minutes Care Plan discussed with: Patient Discussed:  Care Plan Prophylaxis:  SCD's 
 
Probable Disposition:  Home w/Family 
        
___________________________________________________ Attending Physician: Jessica Hussein MD

## 2018-12-30 NOTE — PROGRESS NOTES
Problem: Falls - Risk of 
Goal: *Absence of Falls Document Carlitos Kappa Fall Risk and appropriate interventions in the flowsheet. Outcome: Progressing Towards Goal 
Fall Risk Interventions: 
  
 
  
 
Medication Interventions: Assess postural VS orthostatic hypotension, Evaluate medications/consider consulting pharmacy, Patient to call before getting OOB, Teach patient to arise slowly History of Falls Interventions: Consult care management for discharge planning, Door open when patient unattended, Investigate reason for fall

## 2018-12-30 NOTE — DISCHARGE INSTRUCTIONS
Discharge Instructions       PATIENT ID: Jeevan Yee  MRN: [id number]   YOB: 1935    DATE OF ADMISSION: 12/30/2018 12:17 AM    DATE OF DISCHARGE: 12/30/2018    PRIMARY CARE PROVIDER: Chapin Minor MD     ATTENDING PHYSICIAN: Alberto Jensen MD  DISCHARGING PROVIDER: Judd Ford MD    To contact this individual call [phone] and ask the  to page. If unavailable ask to be transferred the Adult Hospitalist Department. DISCHARGE DIAGNOSES na    CONSULTATIONS: None    PROCEDURES/SURGERIES: * No surgery found *    PENDING TEST RESULTS:   At the time of discharge the following test results are still pending: na    FOLLOW UP APPOINTMENTS:   Follow-up Information     Follow up With Specialties Details Why Contact Info    Chapin Minor MD Internal Medicine In 4 days  [address] [facility] [address] [facility]      Letty Berry MD Neurosurgery In 3 weeks  [address]  [phone]             ADDITIONAL CARE RECOMMENDATIONS: Small subdural hematoma ( resolved)  , laceration rt hnad,     DIET: usual diet      ACTIVITY: Activity as tolerated    WOUND CARE: an    EQUIPMENT needed: na      DISCHARGE MEDICATIONS:   See Medication Reconciliation Form    · It is important that you take the medication exactly as they are prescribed. · Keep your medication in the bottles provided by the pharmacist and keep a list of the medication names, dosages, and times to be taken in your wallet. · Do not take other medications without consulting your doctor. NOTIFY YOUR PHYSICIAN FOR ANY OF THE FOLLOWING:   Fever over 101 degrees for 24 hours. Chest pain, shortness of breath, fever, chills, nausea, vomiting, diarrhea, change in mentation, falling, weakness, bleeding. Severe pain or pain not relieved by medications. Or, any other signs or symptoms that you may have questions about.       DISPOSITION:    Home With:   OT  PT [place]  RN       SNF/Inpatient Rehab/LTAC   x Independent/assisted living    Hospice    Other:          Signed:   La Lucas MD  12/30/2018  3:42 PM

## 2018-12-31 ENCOUNTER — TELEPHONE (OUTPATIENT)
Dept: INTERNAL MEDICINE CLINIC | Age: 83
End: 2018-12-31

## 2018-12-31 NOTE — TELEPHONE ENCOUNTER
Called, spoke to pt. Two pt identifiers confirmed. Pt offered and accepted appt for 1/4/19 1030. Pt verbalized understanding of information discussed w/ no further questions at this time.

## 2019-01-04 ENCOUNTER — OFFICE VISIT (OUTPATIENT)
Dept: INTERNAL MEDICINE CLINIC | Age: 84
End: 2019-01-04

## 2019-01-04 ENCOUNTER — TELEPHONE (OUTPATIENT)
Dept: INTERNAL MEDICINE CLINIC | Age: 84
End: 2019-01-04

## 2019-01-04 VITALS
SYSTOLIC BLOOD PRESSURE: 114 MMHG | HEART RATE: 82 BPM | HEIGHT: 67 IN | OXYGEN SATURATION: 100 % | DIASTOLIC BLOOD PRESSURE: 63 MMHG | TEMPERATURE: 97.7 F | WEIGHT: 167 LBS | BODY MASS INDEX: 26.21 KG/M2 | RESPIRATION RATE: 16 BRPM

## 2019-01-04 DIAGNOSIS — M1A.9XX0 CHRONIC GOUT WITHOUT TOPHUS, UNSPECIFIED CAUSE, UNSPECIFIED SITE: ICD-10-CM

## 2019-01-04 DIAGNOSIS — Z23 ENCOUNTER FOR IMMUNIZATION: ICD-10-CM

## 2019-01-04 DIAGNOSIS — R73.03 BORDERLINE DIABETES: ICD-10-CM

## 2019-01-04 DIAGNOSIS — S61.411A LACERATION OF RIGHT HAND WITHOUT FOREIGN BODY, INITIAL ENCOUNTER: ICD-10-CM

## 2019-01-04 DIAGNOSIS — I10 ESSENTIAL HYPERTENSION, BENIGN: ICD-10-CM

## 2019-01-04 DIAGNOSIS — E78.2 MIXED HYPERLIPIDEMIA: Primary | ICD-10-CM

## 2019-01-04 DIAGNOSIS — I60.9 SUBARACHNOID HEMORRHAGE (HCC): ICD-10-CM

## 2019-01-04 NOTE — PROGRESS NOTES
HISTORY OF PRESENT ILLNESS Micheal Hernandez is a 82 y.o. female. HPI Last here 9/4/18. Pt is here to f/u on chronic conditions. Pt brought in all of her pill bottles - reviewed. Pt was admitted to the hospital 12/29/18 and discharged 12/30/18 for a fall Pt fell stepping over a pothole, and she hit her head on the R side No loc, feeling better, no HA Reviewed discharge summary 12/18: This is an 77-year-old woman who is on Plavix and had a mechanical fall yesterday. Jackie Jay has a very small amount of hemorrhage on her initial head CT; however, given that she was on Plavix, we will repeat her head CT.  If that is unchanged she will be discharged home. Jackie Jay will follow up with me in 2-4 weeks.  At this point, we will keep her off her Plavix and wondering if she needs that continued given that she has not had any recent stent placement or history of stroke.  We will discuss this further when she sees me in followup. Reviewed labs 12/18 Reviewed CT head 12/29/18: Small focus of right parietal subdural or subarachnoid hemorrhage. Reviewed CT head 12/30/18: No acute intracranial hemorrhage, mass or infarct. Reviewed XR R hand 12/18: No fracture or acute abnormality. Reviewed consult Dr Taylor Talbert (neurosurg): This is an 77-year-old woman who is on Plavix and had a mechanical fall yesterday. She has a very small amount of hemorrhage on her initial head CT; however, given that she was on Plavix, we will repeat her head CT. If that is unchanged she will be discharged home. She will follow up with me in 2-4 weeks. At this point, we will keep her off her Plavix and wondering if she needs that continued given that she has not had any recent stent placement or history of stroke. We will discuss this further when she sees me in followup. Pt is working to schedule f/u with Dr Taylor Talbert Pt's ASA and plavix were stopped for now Pt is on keflex for 7 days for hand laceration She is still sore on the R side Pt has stitches in her R hand which she states will be removed in 2 weeks from when they were put in 
Head fine, no ha 
   
BP is 165/71--repeat good BP was 127/76 when she checked at home this AM 
Continues on chlorthalidone 25mg daily, metoprolol 50mg BID, and lotrel 5-20mg daily Pt took her meds this AM 
   
Wt is down 2 lbs x 7/18 Discussed diet and w/l  
  
Reviewed labs 9/18 
   
Pt follows with Dr. Jim Lim (cardio) Last visit was 5/10/18 Plavix 75mg and ASA 81mg were stopped during hospitalization --will restart in 1-2 weeks 
  
Pt previously followed with Dr. Rudi Basilio (derm) for eczema Pt avoids certain foods and drinks tart cranberry juice with improvement to sx Pt will only f/u with this physician prn No recent flares or itching stable  
   
Continues on lipitor 20mg daily for cholesterol - at goal in october Recall could not tolerate crestor Lov, pt c/o gout flares Lov, started allopurinol 100mg jeff well No flares, no SE Pt has had no further flares 
   
ACP not on file. SDMs are her  Yadira Solorzano), son and daughter-in-law. Provided information in the past. 
   
PREVENTIVE:   
Colonoscopy: declines further Pap: 9/2010, declines further Mammogram: declines further Dexa: declines further Tdap: never completed Pneumovax: 11/18/2014 [id number]: 4/28/2016 Shingrix: declines Flu shot: 01/04/19 Foot exam: 04/24/18  
Microalbumin: 9/18 A1c:, 9/15 5.9, 1/16 6.1, 7/16 6.1, 1/17 5.9, 4/17 6.0, 10/17 5.8, 4/18 6.0, 9/18 6.5 Eye exam: [facility], 4/18 Lipids: 9/18 LDL 74 Patient Active Problem List  
 Diagnosis Date Noted  Diabetes mellitus without complication ([facility] [address] 66/80/8982  Left carotid bruit 01/16/2015  Closed bimalleolar fracture of right ankle 10/18/2013  Gout 06/11/2013  Mitral valve disorders(424.0) 05/08/2012  Mixed hyperlipidemia 03/07/2012  Essential hypertension, benign 03/07/2012  Postsurgical percutaneous transluminal coronary angioplasty status 03/07/2012  S/P Stent LAD (LISSA) 03/07/2012  Coronary atherosclerosis of native coronary artery 03/07/2012  Borderline diabetes 03/07/2012  Hypertension 09/16/2009  Hyperlipidemia 09/16/2009  Diabetes ([facility] [address] 09/16/2009  CAD (coronary artery disease) 09/16/2009 Current Outpatient Medications Medication Sig Dispense Refill  atorvastatin (LIPITOR) 20 mg tablet TAKE 1 TABLET BY MOUTH EVERY NIGHT AT BEDTIME 90 Tab 2  
 metoprolol tartrate (LOPRESSOR) 50 mg tablet TAKE 1 TABLET BY MOUTH TWICE DAILY 180 Tab 1  chlorthalidone (HYGROTEN) 25 mg tablet TAKE 1 TABLET BY MOUTH DAILY. 90 Tab 1  
 amLODIPine-benazepril (LOTREL) 5-20 mg per capsule TAKE 1 CAPSULE BY MOUTH EVERY DAY 90 Cap 0  Blood-Glucose Meter monitoring kit Check blood sugar twice weekly. 1 Kit 0  
 lancets 32 gauge misc Check blood sugar twice weekly. 100 Lancet 0  
 glucose blood VI test strips (BLOOD GLUCOSE TEST) strip Check blood sugar twice weekly. 100 Strip 0  
 allopurinol (ZYLOPRIM) 100 mg tablet TAKE 1 TABLET BY MOUTH DAILY 90 Tab 3  MULTIVITAMIN PO Take  by mouth daily as needed.  diphenhydrAMINE (BENADRYL) 25 mg capsule Take 25 mg by mouth every six (6) hours as needed. Past Surgical History:  
Procedure Laterality Date  CARDIAC SURG PROCEDURE UNLIST  2008  
 cardiac stent  HX ORTHOPAEDIC  10/2013  
 right ankle  HX ORTHOPAEDIC  2/20/14 INCISION AND DRAINAGE RIGHT ANKLE AND HARDWARE REMOVAL Lab Results Component Value Date/Time WBC 10.3 12/29/2018 10:10 PM  
 HGB 14.1 12/29/2018 10:10 PM  
 HCT 41.9 12/29/2018 10:10 PM  
 PLATELET 400 01/52/1053 10:10 PM  
 MCV 92.1 12/29/2018 10:10 PM  
 
Lab Results Component Value Date/Time  Cholesterol, total 153 09/04/2018 09:18 AM  
 HDL Cholesterol 60 09/04/2018 09:18 AM  
 LDL, calculated 74 09/04/2018 09:18 AM  
 Triglyceride 95 09/04/2018 09:18 AM  
 CHOL/HDL Ratio 2.6 09/15/2010 07:53 AM  
 
Lab Results Component Value Date/Time GFR est non-AA >60 12/29/2018 10:10 PM  
 GFR est AA >60 12/29/2018 10:10 PM  
 Creatinine 0.69 12/29/2018 10:10 PM  
 BUN 13 12/29/2018 10:10 PM  
 Sodium 139 12/29/2018 10:10 PM  
 Potassium 3.3 (L) 12/29/2018 10:10 PM  
 Chloride 100 12/29/2018 10:10 PM  
 CO2 30 12/29/2018 10:10 PM  
  
Review of Systems Constitutional: Negative for chills and fever. HENT: Negative for hearing loss and tinnitus. Eyes: Negative for blurred vision and double vision. Respiratory: Negative for shortness of breath and wheezing. Cardiovascular: Negative for chest pain and palpitations. Gastrointestinal: Negative for nausea and vomiting. Genitourinary: Negative for dysuria and frequency. Musculoskeletal: Positive for falls and myalgias. Negative for back pain. Skin: Negative for itching and rash. Neurological: Negative for dizziness, loss of consciousness and headaches. Endo/Heme/Allergies: Bruises/bleeds easily. Psychiatric/Behavioral: Negative for depression. The patient is not nervous/anxious. Physical Exam  
Constitutional: She is oriented to person, place, and time. She appears well-developed and well-nourished. No distress. HENT:  
Head: Normocephalic and atraumatic. Mouth/Throat: Oropharynx is clear and moist. No oropharyngeal exudate. Eyes: Conjunctivae and EOM are normal. Right eye exhibits no discharge. Left eye exhibits no discharge. Neck: Normal range of motion. Neck supple. Cardiovascular: Normal rate, regular rhythm and normal heart sounds. Exam reveals no gallop and no friction rub. No murmur heard. Pulmonary/Chest: Effort normal and breath sounds normal. No respiratory distress. She has no wheezes. She has no rales. She exhibits no tenderness. Abdominal: Soft. She exhibits no distension. There is no tenderness. There is no rebound and no guarding. Musculoskeletal: She exhibits no edema, tenderness or deformity. Lymphadenopathy:  
  She has no cervical adenopathy. Neurological: She is alert and oriented to person, place, and time. Coordination abnormal.  
Skin: Skin is warm and dry. No rash noted. She is not diaphoretic. No erythema. No pallor. Ecchymosis on palmar surface of R hand, laceration well approximated, no erythema or oozing, sutures intact Psychiatric: She has a normal mood and affect. Her behavior is normal.  
 
 
ASSESSMENT and PLAN 
  ICD-10-CM ICD-9-CM 1. Mixed hyperlipidemia Controlled on lipitor 20mg daily, continue E78.2 272.2 2. Essential hypertension, benign 
 
initial BP borderline, repeat very well controlled, continue chlorthalidone, metoprolol and lotrel, continue no change to dose I10 401.1 3. Borderline diabetes Diet controlled, a1c stable, no meds needed R73.03 790.29   
4. Laceration of right hand without foreign body, initial encounter Healing well, no signs of infection, continue keflex, will need sutures removed in 1-2 weeks, discussed keeping it bandaged S61.411A 882.0 5. Subarachnoid hemorrhage ([facility] [address] Stable, has had 2 CTs, asymptomatic, will be seeing neurosurg in 1-2 weeks and will likely need repeat CT, at that point time plavix and ASA will be restarted I60.9 430   
6. Chronic gout without tophus, 
 unspecified cause, unspecified site Now on allopurinol, tolerating this quite well, has not had any gout sx since starting medication M1A. 9XX0 274.02 Scribed by Jamie Trent of [address], as dictated by Dr. Aura Devi. Current diagnosis and concerns discussed with pt at length. Pt understands risks and benefits or current treatment plan and medications, and accepts the treatment and medication with any possible risks. Pt asks appropriate questions, which were answered. Pt was instructed to call with any concerns or problems. I have reviewed the note documented by the scribe. The services provided are my own. The documentation is accurate

## 2019-01-04 NOTE — TELEPHONE ENCOUNTER
[facility] states he didn't understand the date of the appt that you made for his wife. Please call him back with this information as he is home now.

## 2019-01-04 NOTE — TELEPHONE ENCOUNTER
Called, spoke to pt. Two pt identifiers confirmed. Pt states there was some confusion on her appt. Pt reminded f/u 4/23/19 [id number]. Pt verbalized understanding of information discussed w/ no further questions at this time.

## 2019-01-07 ENCOUNTER — TELEPHONE (OUTPATIENT)
Dept: INTERNAL MEDICINE CLINIC | Age: 84
End: 2019-01-07

## 2019-01-07 NOTE — TELEPHONE ENCOUNTER
#[phone] Dr. Kevin Ray saw pt today in office and would like her to stay off the Plavix. Please call to let her know and ask any questions. Thanks.

## 2019-01-09 ENCOUNTER — TELEPHONE (OUTPATIENT)
Dept: CARDIOLOGY CLINIC | Age: 84
End: 2019-01-09

## 2019-01-15 ENCOUNTER — OFFICE VISIT (OUTPATIENT)
Dept: INTERNAL MEDICINE CLINIC | Age: 84
End: 2019-01-15

## 2019-01-15 VITALS
TEMPERATURE: 97.3 F | SYSTOLIC BLOOD PRESSURE: 131 MMHG | HEIGHT: 67 IN | BODY MASS INDEX: 25.9 KG/M2 | HEART RATE: 57 BPM | WEIGHT: 165 LBS | RESPIRATION RATE: 14 BRPM | OXYGEN SATURATION: 96 % | DIASTOLIC BLOOD PRESSURE: 51 MMHG

## 2019-01-15 DIAGNOSIS — S61.411A LACERATION OF RIGHT HAND, FOREIGN BODY PRESENCE UNSPECIFIED, INITIAL ENCOUNTER: Primary | ICD-10-CM

## 2019-01-15 RX ORDER — AMLODIPINE AND BENAZEPRIL HYDROCHLORIDE 5; 20 MG/1; MG/1
CAPSULE ORAL
Qty: 90 CAP | Refills: 0 | Status: SHIPPED | OUTPATIENT
Start: 2019-01-15 | End: 2019-02-28 | Stop reason: SDUPTHER

## 2019-01-15 NOTE — PROGRESS NOTES
SUBJECTIVE  Ms. Trent Sigala is a patient of Jorge Lopez MD and presents today acutely for     Chief Complaint   Patient presents with    Suture Removal     pt here today for stiches remove from R hand (6 stiches)    Medication Refill       She stepped in a pot hole, fell, and scraped R hand. She went to ER and had stitches placed. Now she is here for follow up and suture removal.  No dehiscence or drainage. Pain is improved. OBJECTIVE  Visit Vitals  /51 (BP 1 Location: Left arm, BP Patient Position: Sitting)   Pulse (!) 57   Temp 97.3 °F (36.3 °C) (Oral)   Resp 14   Ht 5' 7\" (1.702 m)   Wt 165 lb (74.8 kg)   SpO2 96%   BMI 25.84 kg/m²     Gen: Pleasant 82 y.o.  female in NAD.     SKIN: Warm. Dry. Healing laceration of R medial hand, with 6 simple interrupted sutures. .    ASSESSMENT   Laceration hand, healing well. PLAN  Sutures removed. Watch for any signs of infection (redness, purulent drainage, etc). I have reviewed with the patient details of the assessment and plan and all questions were answered. Relevant patient education was performed. Follow-up Disposition:  Return if symptoms worsen or fail to improve.

## 2019-01-15 NOTE — PROGRESS NOTES
1. Have you been to the ER, urgent care clinic since your last visit? Hospitalized since your last visit? Reg in [facility]    2. Have you seen or consulted any other health care providers outside of the [address] [place] [address] since your last visit? Include any pap smears or colon screening.  no

## 2019-03-01 RX ORDER — AMLODIPINE AND BENAZEPRIL HYDROCHLORIDE 5; 20 MG/1; MG/1
CAPSULE ORAL
Qty: 90 CAP | Refills: 0 | Status: SHIPPED | OUTPATIENT
Start: 2019-03-01 | End: 2019-04-23 | Stop reason: SDUPTHER

## 2019-04-22 ENCOUNTER — DOCUMENTATION ONLY (OUTPATIENT)
Dept: INTERNAL MEDICINE CLINIC | Age: 84
End: 2019-04-22

## 2019-04-22 NOTE — PROGRESS NOTES
Medicare Part B Preventive Services Limitations Recommendation Scheduled   Bone Mass Measurement  (age 72 & older, biennial) Requires diagnosis related to osteoporosis or estrogen deficiency. Biennial benefit unless patient has history of long-term glucocorticoid tx or baseline is needed because initial test was by other method Never completed      Recommended every 2 years Declines   Cardiovascular Screening Blood Tests (every 5 years)  Total cholesterol, HDL, Triglycerides Order as a panel if possible Completed 9/2018     Recommended annually Due 9/2019   Colorectal Cancer Screening  -Fecal occult blood test (annual)  -Flexible sigmoidoscopy (5y)  -Screening colonoscopy (10y)  -Barium Enema    Never completed  Declines   Counseling to Prevent Tobacco Use (up to 8 sessions per year)  - Counseling greater than 3 and up to 10 minutes  - Counseling greater than 10 minutes Patients must be asymptomatic of tobacco-related conditions to receive as preventive service N/A N/A   Diabetes Screening Tests (at least every 3 years, Medicare covers annually or at 6-month intervals for prediabetic patients)      Fasting blood sugar (FBS) or glucose tolerance test (GTT) Patient must be diagnosed with one of the following:  -Hypertension, Dyslipidemia, obesity, previous impaired FBS or GTT  Or any two of the following: overweight, FH of diabetes, age ? 72, history of gestational diabetes, birth of baby weighing more than 9 pounds Completed 9/2018 with A1C 6.5     Recommended every 3-6 months for pre-diabetes/diabetes Due now   Diabetes Self-Management Training (DSMT) (no USPSTF recommendation) Requires referral by treating physician for patient with diabetes or renal disease. 10 hours of initial DSMT session of no less than 30 minutes each in a continuous 12-month period. 2 hours of follow-up DSMT in subsequent years.  N/A N/A   Glaucoma Screening (no USPSTF recommendation) Diabetes mellitus, family history, , age 48 or over,  American, age 72 or over Completed 4/2018     Recommended annually Due now   Human Immunodeficiency Virus (HIV) Screening (annually for increased risk patients)  HIV-1 and HIV-2 by EIA, LEWIS, rapid antibody test, or oral mucosa transudate Patient must be at increased risk for HIV infection per USPSTF guidelines or pregnant. Tests covered annually for patients at increased risk. Pregnant patients may receive up to 3 test during pregnancy. N/A N/A   Medical Nutrition Therapy (MNT) (for diabetes or renal disease not recommended schedule) Requires referral by treating physician for patient with diabetes or renal disease. Can be provided in same year as diabetes self-management training (DSMT), and CMS recommends medical nutrition therapy take place after DSMT. Up to 3 hours for initial year and 2 hours in subsequent years. N/A N/A         Seasonal Influenza Vaccination (annually)    Completed Fall 2018     Recommended annually Due Fall 2019   Pneumococcal Vaccination (once after 72)    Pneumovax - 2014     Prevnar 13 - 2016     Both recommended once over the age of 72 Completed      Completed    Hepatitis B Vaccinations (if medium/high risk) Medium/high risk factors: End-stage renal disease,  Hemophiliacs who received Factor VIII or IX concentrates, Clients of institutions for the mentally retarded, Persons who live in the same house as a HepB virus carrier, Homosexual men, Illicit injectable drug abusers. N/A N/A   Screening Mammography (biennial age 54-69)? Annually (age 36 or over) Never completed Declines   Screening Pap Tests and Pelvic Examination (up to age 79 and after 79 if unknown history or abnormal study last 10 years) Every 25 months except high risk Completed 9/2010     Recommended every 2 years Declines   Ultrasound Screening for Abdominal Aortic Aneurysm (AAA) (once) Patient must be referred through IPPE and not have had a screening for abdominal aortic aneurysm before under Medicare. Limited to patients who meet one of the following criteria:  - Men who are 73-68 years old and have smoked more than 100 cigarettes in their lifetime.  -Anyone with a FH of AAA  -Anyone recommended for screening by USPSTF N/A N/A

## 2019-04-23 ENCOUNTER — OFFICE VISIT (OUTPATIENT)
Dept: INTERNAL MEDICINE CLINIC | Age: 84
End: 2019-04-23

## 2019-04-23 ENCOUNTER — HOSPITAL ENCOUNTER (OUTPATIENT)
Dept: LAB | Age: 84
Discharge: HOME | End: 2019-04-23
Payer: MEDICARE

## 2019-04-23 VITALS
HEIGHT: 67 IN | OXYGEN SATURATION: 100 % | SYSTOLIC BLOOD PRESSURE: 142 MMHG | TEMPERATURE: 98.1 F | HEART RATE: 65 BPM | DIASTOLIC BLOOD PRESSURE: 60 MMHG | WEIGHT: 169 LBS | BODY MASS INDEX: 26.53 KG/M2 | RESPIRATION RATE: 16 BRPM

## 2019-04-23 DIAGNOSIS — E11.9 DIABETES MELLITUS WITHOUT COMPLICATION (HCC): ICD-10-CM

## 2019-04-23 DIAGNOSIS — E78.2 MIXED HYPERLIPIDEMIA: ICD-10-CM

## 2019-04-23 DIAGNOSIS — Z00.00 MEDICARE ANNUAL WELLNESS VISIT, SUBSEQUENT: ICD-10-CM

## 2019-04-23 DIAGNOSIS — I10 ESSENTIAL HYPERTENSION: Primary | ICD-10-CM

## 2019-04-23 DIAGNOSIS — M1A.9XX0 CHRONIC GOUT WITHOUT TOPHUS, UNSPECIFIED CAUSE, UNSPECIFIED SITE: ICD-10-CM

## 2019-04-23 DIAGNOSIS — I25.10 ATHEROSCLEROSIS OF NATIVE CORONARY ARTERY OF NATIVE HEART WITHOUT ANGINA PECTORIS: ICD-10-CM

## 2019-04-23 PROCEDURE — 80053 COMPREHEN METABOLIC PANEL: CPT

## 2019-04-23 PROCEDURE — 84550 ASSAY OF BLOOD/URIC ACID: CPT

## 2019-04-23 PROCEDURE — 84443 ASSAY THYROID STIM HORMONE: CPT

## 2019-04-23 PROCEDURE — 85027 COMPLETE CBC AUTOMATED: CPT

## 2019-04-23 PROCEDURE — 83036 HEMOGLOBIN GLYCOSYLATED A1C: CPT

## 2019-04-23 PROCEDURE — 80061 LIPID PANEL: CPT

## 2019-04-23 PROCEDURE — 36415 COLL VENOUS BLD VENIPUNCTURE: CPT

## 2019-04-23 RX ORDER — AMLODIPINE AND BENAZEPRIL HYDROCHLORIDE 5; 20 MG/1; MG/1
CAPSULE ORAL
Qty: 90 CAP | Refills: 0 | Status: SHIPPED | OUTPATIENT
Start: 2019-04-23 | End: 2019-07-15 | Stop reason: SDUPTHER

## 2019-04-23 NOTE — PROGRESS NOTES
This is the Subsequent Medicare Annual Wellness Exam, performed 12 months or more after the Initial AWV or the last Subsequent AWV I have reviewed the patient's medical history in detail and updated the computerized patient record. History Past Medical History:  
Diagnosis Date  CAD (coronary artery disease) 9/16/2009 EF=65-70%; Mild-mod MR; Dr Christal Riggs  Diabetes ([address] 9/16/2009  
 boarder line controlle by diet and exercise.  Hyperlipidemia 9/16/2009  Hypertension 9/16/2009 Past Surgical History:  
Procedure Laterality Date  CARDIAC SURG PROCEDURE UNLIST  2008  
 cardiac stent  HX ORTHOPAEDIC  10/2013  
 right ankle  HX ORTHOPAEDIC  2/20/14 INCISION AND DRAINAGE RIGHT ANKLE AND HARDWARE REMOVAL Current Outpatient Medications Medication Sig Dispense Refill  varicella-zoster recombinant, PF, (SHINGRIX, PF,) 50 mcg/0.5 mL susr injection 0.5mL by IntraMUSCular route once now and then repeat in 2-6 months 0.5 mL 1  
 amLODIPine-benazepril (LOTREL) 5-20 mg per capsule TAKE ONE CAPSULE BY MOUTH ONCE DAILY. 90 Cap 0  
 atorvastatin (LIPITOR) 20 mg tablet TAKE 1 TABLET BY MOUTH EVERY NIGHT AT BEDTIME 90 Tab 2  
 metoprolol tartrate (LOPRESSOR) 50 mg tablet TAKE 1 TABLET BY MOUTH TWICE DAILY 180 Tab 1  chlorthalidone (HYGROTEN) 25 mg tablet TAKE 1 TABLET BY MOUTH DAILY. 90 Tab 1  
 lancets 32 gauge misc Check blood sugar twice weekly. 100 Lancet 0  
 glucose blood VI test strips (BLOOD GLUCOSE TEST) strip Check blood sugar twice weekly. 100 Strip 0  
 allopurinol (ZYLOPRIM) 100 mg tablet TAKE 1 TABLET BY MOUTH DAILY 90 Tab 3  MULTIVITAMIN PO Take  by mouth daily as needed.  diphenhydrAMINE (BENADRYL) 25 mg capsule Take 25 mg by mouth every six (6) hours as needed. Allergies Allergen Reactions  Crestor [Rosuvastatin] Unknown (comments) Family History Problem Relation Age of Onset  Hypertension Mother  Heart Disease Mother  Hypertension Father  Heart Disease Father Social History Tobacco Use  Smoking status: Never Smoker  Smokeless tobacco: Never Used Substance Use Topics  Alcohol use: No  
 
Patient Active Problem List  
Diagnosis Code  Hypertension I10  
 Hyperlipidemia E78.5  Diabetes ([address] E11.9  
 CAD (coronary artery disease) I25.10  Mixed hyperlipidemia E78.2  
 Essential hypertension, benign I10  
 Postsurgical percutaneous transluminal coronary angioplasty status Z98.61  
 S/P Stent LAD (LISSA) V57.721  
 Coronary atherosclerosis of native coronary artery I25.10  Borderline diabetes R73.03  
 Mitral valve disorders(424.0) I05.9  Gout M10.9  Closed bimalleolar fracture of right ankle Z58.529Z  Left carotid bruit R09.89  
 Diabetes mellitus without complication (HCC) K06.1 Depression Risk Factor Screening:  
 
3 most recent PHQ Screens 4/23/2019 Little interest or pleasure in doing things Not at all Feeling down, depressed, irritable, or hopeless Not at all Total Score PHQ 2 0 Alcohol Risk Factor Screening: You do not drink alcohol or very rarely. Functional Ability and Level of Safety:  
Hearing Loss Hearing is good. Activities of Daily Living The home contains: no safety equipment. Patient does total self care Fall Risk Fall Risk Assessment, last 12 mths 4/23/2019 Able to walk? Yes Fall in past 12 months? No  
Fall with injury? -  
Number of falls in past 12 months - Fall Risk Score -  
tripped on pot hole Abuse Screen Patient is not abused Lives with , safe Cognitive Screening Evaluation of Cognitive Function: 
Has your family/caregiver stated any concerns about your memory: no 
Normal 
 
Patient Care Team  
Patient Care Team: 
Mitali Smith MD as PCP - General (Internal Medicine) Timur Wilcox  (Ophthalmology) Mona De La Paz (Inactive) (Dermatology) Jimmie Cruz MD (Cardiology) Salvador Zamora MD (Infectious Diseases) 
barber (Ophthalmology) Neida Aguilar MD (Neurosurgery)  
[facility] 
updated Assessment/Plan Education and counseling provided: 
Are appropriate based on today's review and evaluation End-of-Life planning (with patient's consent) Screening Mammography Screening Pap and pelvic (covered once every 2 years) Colorectal cancer screening tests Bone mass measurement (DEXA) Screening for glaucoma Diabetes screening test 
 
Diagnoses and all orders for this visit: 
 
1. Medicare annual wellness visit, subsequent Other orders 
-     varicella-zoster recombinant, PF, (SHINGRIX, PF,) 50 mcg/0.5 mL susr injection; 0.5mL by IntraMUSCular route once now and then repeat in 2-6 months Health Maintenance Due Topic Date Due  Shingrix Vaccine Age 50> (1 of 2) 04/18/1987  
 EYE EXAM RETINAL OR DILATED  09/10/2015  
 HEMOGLOBIN A1C Q6M  03/04/2019  
 FOOT EXAM Q1  04/24/2019  MEDICARE YEARLY EXAM  04/25/2019 Discussed with patient about advance medical directive. Provided patient blank AMD and Your Right to Decide Booklet. Requested that if completed to provide a copy of AMD to office. ACP not on file. SDMs are her  Al Gordon), son and daughter-in-law. Provided information in the past. 
   
 
Colonoscopy: declines further Pap: 9/2010, declines further Mammogram: declines further Dexa: declines further Tdap: 12/2018 Pneumovax: 11/18/2014 [id number]: 4/28/2016 Shingrix: ordered 04/23/19 Flu shot: 01/04/19 A1c:  9/18 6.5 due now Vainupea 50, 4/18--scheduled 5/19 Lipids: 9/18 LDL 74  Annually Medication reconciliation completed by MA and reviewed by me. Medical/surgical/social/family history reviewed and updated by me. Patient provided AVS and preventative screening table. Patient verbalized understanding of all information discussed.

## 2019-04-23 NOTE — PROGRESS NOTES
HISTORY OF PRESENT ILLNESS Judith Britton is a 82 y.o. female. HPI Last here 1/14/19. Pt is here to f/u on chronic conditions. Pt brought in all of her pill bottles - reviewed. 
   
BP is 142/60 /67, 121/73 at home, SBP as low as 110 Continues on chlorthalidone 25mg daily, metoprolol 50mg BID, and lotrel 5-20mg daily Pt took her meds this AM 
   
Pt has not been checking her BS recently Wt today is 169 lbs --up 2 lbs x lov Discussed diet and w/l  
  
Reviewed labs 9/18 
   
Pt follows with Dr. Salima Cowart (cardio) Last visit was 5/10/18 Her plavix was stopped during hospitalization She is not taking ASA 81mg either - advised her to restart this No signs sx of cad Next visit scheduled for 5/19 
  
Pt previously followed with Dr. Payam Sorto (derm) for eczema Pt avoids certain foods and drinks tart cranberry juice with improvement to sx Pt will only f/u with this physician prn No recent flares or itching stable  
   
Continues on lipitor 20mg daily for cholesterol Recall could not tolerate crestor 
  
Continues on allopurinol 100mg for gout prevention Pt has had no recent flares Need to repeat uric acid Lov, pt was admitted to the hospital 12/29/18 and discharged 12/30/18 for a fall Pt saw Dr Angela Melara 1/15/19 to have stitches removed from her hand Pt states that she has been doing well since then Reviewed notes 1/7/19 from Dr Zuri Hernandez (neurosurg) regarding 12/30/18 intracranial hemorrhage: continue off plavix if OK, no additional f/u needed Pt has not had a shingles vaccine Discussed shingrix being a dead vaccine Discussed it being more effective than zostavax (92% effective) Discussed it being a 2 part series Ordered 04/23/19 Lives with her , this is a safe environment Fully functional independently 
   
ACP not on file. SDMs are her  Janny Lua), son and daughter-in-law. Provided information in the past. 
   
PREVENTIVE:   
Colonoscopy: declines further Pap: 9/2010, declines further Mammogram: declines further Dexa: declines further Tdap: 12/2018 Pneumovax: 11/18/2014 [id number]: 4/28/2016 Shingrix: ordered 04/23/19 Flu shot: 01/04/19 Foot exam: 04/23/19 Microalbumin: 9/18 A1c: 9/15 5.9, 1/16 6.1, 7/16 6.1, 1/17 5.9, 4/17 6.0, 10/17 5.8, 4/18 6.0, 9/18 6.5 Vainupea 50, 4/18--scheduled 5/19 Lipids: 9/18 LDL 74 
  
Patient Active Problem List  
 Diagnosis Date Noted  Diabetes mellitus without complication ([facility] [address] 06/31/7147  Left carotid bruit 01/16/2015  Closed bimalleolar fracture of right ankle 10/18/2013  Gout 06/11/2013  Mitral valve disorders(424.0) 05/08/2012  Mixed hyperlipidemia 03/07/2012  Essential hypertension, benign 03/07/2012  Postsurgical percutaneous transluminal coronary angioplasty status 03/07/2012  S/P Stent LAD (LISSA) 03/07/2012  Coronary atherosclerosis of native coronary artery 03/07/2012  Borderline diabetes 03/07/2012  Hypertension 09/16/2009  Hyperlipidemia 09/16/2009  Diabetes ([facility] [address] 09/16/2009  CAD (coronary artery disease) 09/16/2009 Current Outpatient Medications Medication Sig Dispense Refill  varicella-zoster recombinant, PF, (SHINGRIX, PF,) 50 mcg/0.5 mL susr injection 0.5mL by IntraMUSCular route once now and then repeat in 2-6 months 0.5 mL 1  
 amLODIPine-benazepril (LOTREL) 5-20 mg per capsule TAKE ONE CAPSULE BY MOUTH ONCE DAILY. 90 Cap 0  
 atorvastatin (LIPITOR) 20 mg tablet TAKE 1 TABLET BY MOUTH EVERY NIGHT AT BEDTIME 90 Tab 2  
 metoprolol tartrate (LOPRESSOR) 50 mg tablet TAKE 1 TABLET BY MOUTH TWICE DAILY 180 Tab 1  chlorthalidone (HYGROTEN) 25 mg tablet TAKE 1 TABLET BY MOUTH DAILY. 90 Tab 1  
 lancets 32 gauge misc Check blood sugar twice weekly. 100 Lancet 0  
 glucose blood VI test strips (BLOOD GLUCOSE TEST) strip Check blood sugar twice weekly.  100 Strip 0  
  allopurinol (ZYLOPRIM) 100 mg tablet TAKE 1 TABLET BY MOUTH DAILY 90 Tab 3  MULTIVITAMIN PO Take  by mouth daily as needed.  diphenhydrAMINE (BENADRYL) 25 mg capsule Take 25 mg by mouth every six (6) hours as needed. Past Surgical History:  
Procedure Laterality Date  CARDIAC SURG PROCEDURE UNLIST  2008  
 cardiac stent  HX ORTHOPAEDIC  10/2013  
 right ankle  HX ORTHOPAEDIC  2/20/14 INCISION AND DRAINAGE RIGHT ANKLE AND HARDWARE REMOVAL Lab Results Component Value Date/Time WBC 10.3 12/29/2018 10:10 PM  
 HGB 14.1 12/29/2018 10:10 PM  
 HCT 41.9 12/29/2018 10:10 PM  
 PLATELET 324 85/00/8069 10:10 PM  
 MCV 92.1 12/29/2018 10:10 PM  
 
Lab Results Component Value Date/Time Cholesterol, total 153 09/04/2018 09:18 AM  
 HDL Cholesterol 60 09/04/2018 09:18 AM  
 LDL, calculated 74 09/04/2018 09:18 AM  
 Triglyceride 95 09/04/2018 09:18 AM  
 CHOL/HDL Ratio 2.6 09/15/2010 07:53 AM  
 
Lab Results Component Value Date/Time GFR est non-AA >60 12/29/2018 10:10 PM  
 GFR est AA >60 12/29/2018 10:10 PM  
 Creatinine 0.69 12/29/2018 10:10 PM  
 BUN 13 12/29/2018 10:10 PM  
 Sodium 139 12/29/2018 10:10 PM  
 Potassium 3.3 (L) 12/29/2018 10:10 PM  
 Chloride 100 12/29/2018 10:10 PM  
 CO2 30 12/29/2018 10:10 PM  
  
Review of Systems Respiratory: Negative for shortness of breath. Cardiovascular: Negative for chest pain. Physical Exam  
Constitutional: She is oriented to person, place, and time. She appears well-developed and well-nourished. No distress. HENT:  
Head: Normocephalic and atraumatic. Right Ear: External ear normal.  
Left Ear: External ear normal.  
Mouth/Throat: Oropharynx is clear and moist. No oropharyngeal exudate. Eyes: Conjunctivae and EOM are normal. Right eye exhibits no discharge. Left eye exhibits no discharge. Neck: Normal range of motion. Neck supple. No carotid bruits Cardiovascular: Normal rate, regular rhythm and normal heart sounds. Exam reveals no gallop and no friction rub. No murmur heard. Pulmonary/Chest: Effort normal and breath sounds normal. No respiratory distress. She has no wheezes. She has no rales. She exhibits no tenderness. Musculoskeletal: Normal range of motion. She exhibits no edema, tenderness or deformity. Lymphadenopathy:  
  She has no cervical adenopathy. Neurological: She is alert and oriented to person, place, and time. Coordination normal.  
Monofilament nl BLE, good peripheral pulses, no ulcers, bunion Skin: Skin is warm and dry. No rash noted. She is not diaphoretic. No erythema. No pallor. Psychiatric: She has a normal mood and affect. Her behavior is normal.  
 
 
ASSESSMENT and PLAN 
  ICD-10-CM ICD-9-CM 1. Essential hypertension Controlled on lotrel, chlorthalidone, ane metoprolol, no change to dose I10 401.9 LIPID PANEL  
   CBC W/O DIFF  
   HEMOGLOBIN A1C WITH EAG  
   TSH 3RD GENERATION  
   URIC ACID METABOLIC PANEL, COMPREHENSIVE CANCELED: MICROALBUMIN, UR, RAND W/ MICROALB/CREAT RATIO 2. Medicare annual wellness visit, subsequent Z00.00 V70.0 LIPID PANEL  
   CBC W/O DIFF  
   HEMOGLOBIN A1C WITH EAG  
   TSH 3RD GENERATION  
   URIC ACID METABOLIC PANEL, COMPREHENSIVE CANCELED: MICROALBUMIN, UR, RAND W/ MICROALB/CREAT RATIO 3. Mixed hyperlipidemia Controlled on lipitor E78.2 272.2 LIPID PANEL  
   CBC W/O DIFF  
   HEMOGLOBIN A1C WITH EAG  
   TSH 3RD GENERATION  
   URIC ACID METABOLIC PANEL, COMPREHENSIVE CANCELED: MICROALBUMIN, UR, RAND W/ MICROALB/CREAT RATIO 4. Atherosclerosis of native coronary artery of native heart without angina pectoris Has f/u with Dr Wendy Duenas next month, advised her to restart her daily ASA 81mg, she remains off plavix at this time  I25.10 414.01 LIPID PANEL  
   CBC W/O DIFF  
   HEMOGLOBIN A1C WITH EAG  
   TSH 3RD GENERATION  
   URIC ACID  
 METABOLIC PANEL, COMPREHENSIVE CANCELED: MICROALBUMIN, UR, RAND W/ MICROALB/CREAT RATIO 5. Diabetes mellitus without complication ([address] Diet controlled at this point, wt is up a few lbs, does not monitor glucose, will check a1c and treat further if this climbs E11.9 250.00 LIPID PANEL  
   CBC W/O DIFF  
   HEMOGLOBIN A1C WITH EAG  
   TSH 3RD GENERATION  
   URIC ACID METABOLIC PANEL, COMPREHENSIVE  
    DIABETES FOOT EXAM  
   CANCELED: MICROALBUMIN, UR, RAND W/ MICROALB/CREAT RATIO 6. Chronic gout without tophus, unspecified cause, unspecified site No recent flares, doing well on allopurinol 100mg daily M1A. 9XX0 274.02 Depression screen reviewed and negative. Scribed by Pola Winston of [address] [place] [address] [address], as dictated by Dr. Mercy Shankar. Current diagnosis and concerns discussed with pt at length. Pt understands risks and benefits or current treatment plan and medications, and accepts the treatment and medication with any possible risks. Pt asks appropriate questions, which were answered. Pt was instructed to call with any concerns or problems. I have reviewed the note documented by the scribe. The services provided are my own. The documentation is accurate

## 2019-04-23 NOTE — PATIENT INSTRUCTIONS
Medicare Wellness Visit, Female The best way to live healthy is to have a lifestyle where you eat a well-balanced diet, exercise regularly, limit alcohol use, and quit all forms of tobacco/nicotine, if applicable. Regular preventive services are another way to keep healthy. Preventive services (vaccines, screening tests, monitoring & exams) can help personalize your care plan, which helps you manage your own care. Screening tests can find health problems at the earliest stages, when they are easiest to treat. [facility] follows the current, evidence-based guidelines published by the [facility] ([facility]) when recommending preventive services for our patients. Because we follow these guidelines, sometimes recommendations change over time as research supports it. (For example, mammograms used to be recommended annually. Even though Medicare will still pay for an annual mammogram, the newer guidelines recommend a mammogram every two years for women of average risk.) Of course, you and your doctor may decide to screen more often for some diseases, based on your risk and your health status. Preventive services for you include: - Medicare offers their members a free annual wellness visit, which is time for you and your primary care provider to discuss and plan for your preventive service needs. Take advantage of this benefit every year! 
-All adults over the age of 72 should receive the recommended pneumonia vaccines. Current USPSTF guidelines recommend a series of two vaccines for the best pneumonia protection.  
-All adults should have a flu vaccine yearly and a tetanus vaccine every 10 years. All adults age 61 and older should receive a shingles vaccine once in their lifetime.   
-A bone mass density test is recommended when a woman turns 65 to screen for osteoporosis. This test is only recommended one time, as a screening. Some providers will use this same test as a disease monitoring tool if you already have osteoporosis. -All adults age 38-68 who are overweight should have a diabetes screening test once every three years.  
-Other screening tests and preventive services for persons with diabetes include: an eye exam to screen for diabetic retinopathy, a kidney function test, a foot exam, and stricter control over your cholesterol.  
-Cardiovascular screening for adults with routine risk involves an electrocardiogram (ECG) at intervals determined by your doctor.  
-Colorectal cancer screenings should be done for adults age 54-65 with no increased risk factors for colorectal cancer. There are a number of acceptable methods of screening for this type of cancer. Each test has its own benefits and drawbacks. Discuss with your doctor what is most appropriate for you during your annual wellness visit. The different tests include: colonoscopy (considered the best screening method), a fecal occult blood test, a fecal DNA test, and sigmoidoscopy. -Breast cancer screenings are recommended every other year for women of normal risk, age 54-69. 
-Cervical cancer screenings for women over age 72 are only recommended with certain risk factors.  
-All adults born between [facility] should be screened once for Hepatitis C. Here is a list of your current Health Maintenance items (your personalized list of preventive services) with a due date: 
Health Maintenance Due Topic Date Due  Shingles Vaccine (1 of 2) 04/18/1987 [facility] Eye Exam  09/10/2015  Hemoglobin A1C    03/04/2019 [facility] Diabetic Foot Care  04/24/2019 [facility] Annual Well Visit  04/25/2019 Medicare Part B Preventive Services Limitations Recommendation Scheduled Bone Mass Measurement 
(age 72 & older, biennial) Requires diagnosis related to osteoporosis or estrogen deficiency.  Biennial benefit unless patient has history of long-term glucocorticoid tx or baseline is needed because initial test was by other method Never completed  
  
Recommended every 2 years Declines Cardiovascular Screening Blood Tests (every 5 years) Total cholesterol, HDL, Triglycerides Order as a panel if possible Completed 9/2018 
  
Recommended annually Due 9/2019 Colorectal Cancer Screening 
-Fecal occult blood test (annual) -Flexible sigmoidoscopy (5y) 
-Screening colonoscopy (10y) -Barium Enema   Never completed  Declines Counseling to Prevent Tobacco Use (up to 8 sessions per year) - Counseling greater than 3 and up to 10 minutes - Counseling greater than 10 minutes Patients must be asymptomatic of tobacco-related conditions to receive as preventive service N/A N/A Diabetes Screening Tests (at least every 3 years, Medicare covers annually or at 6-month intervals for prediabetic patients) 
  Fasting blood sugar (FBS) or glucose tolerance test (GTT) Patient must be diagnosed with one of the following: 
-Hypertension, Dyslipidemia, obesity, previous impaired FBS or GTT 
Or any two of the following: overweight, FH of diabetes, age ? 72, history of gestational diabetes, birth of baby weighing more than 9 pounds Completed 9/2018 with A1C 6.5  
  
Recommended every 3-6 months for pre-diabetes/diabetes Due now Diabetes Self-Management Training (DSMT) (no USPSTF recommendation) Requires referral by treating physician for patient with diabetes or renal disease. 10 hours of initial DSMT session of no less than 30 minutes each in a continuous 12-month period. 2 hours of follow-up DSMT in subsequent years. N/A N/A Glaucoma Screening (no USPSTF recommendation) Diabetes mellitus, family history, , age 48 or over,  American, age 72 or over Completed 4/2018 
  
Recommended annually Due now--scheduled Human Immunodeficiency Virus (HIV) Screening (annually for increased risk patients) HIV-1 and HIV-2 by EIA, LEWIS, rapid antibody test, or oral mucosa transudate Patient must be at increased risk for HIV infection per USPSTF guidelines or pregnant. Tests covered annually for patients at increased risk. Pregnant patients may receive up to 3 test during pregnancy. N/A N/A Medical Nutrition Therapy (MNT) (for diabetes or renal disease not recommended schedule) Requires referral by treating physician for patient with diabetes or renal disease. Can be provided in same year as diabetes self-management training (DSMT), and CMS recommends medical nutrition therapy take place after DSMT. Up to 3 hours for initial year and 2 hours in subsequent years. N/A N/A  
         
Seasonal Influenza Vaccination (annually)   Completed Fall 2018 
  
Recommended annually Due Fall 2019 Pneumococcal Vaccination (once after 65)   Pneumovax - 1227 
  
Prevnar 13 - 2016 
  
Both recommended once over the age of 72 Completed 
  
Completed Hepatitis B Vaccinations (if medium/high risk) Medium/high risk factors: End-stage renal disease, Hemophiliacs who received Factor VIII or IX concentrates, Clients of institutions for the mentally retarded, Persons who live in the same house as a HepB virus carrier, Homosexual men, Illicit injectable drug abusers. N/A N/A Screening Mammography (biennial age 54-69)? Annually (age 36 or over) Never completed Declines Screening Pap Tests and Pelvic Examination (up to age 79 and after 79 if unknown history or abnormal study last 10 years) Every 24 months except high risk Completed 9/2010 
  
Recommended every 2 years Declines Ultrasound Screening for Abdominal Aortic Aneurysm (AAA) (once) Patient must be referred through IPPE and not have had a screening for abdominal aortic aneurysm before under Medicare. Limited to patients who meet one of the following criteria: 
- Men who are 73-68 years old and have smoked more than 100 cigarettes in their lifetime. 
-Anyone with a FH of AAA -Anyone recommended for screening by USPSTF N/A N/A  
  
Please bring in a copy of your advanced directive to your next office visit so we can have a copy on file.

## 2019-04-24 LAB
ALBUMIN SERPL-MCNC: 4.3 G/DL (ref 3.5–4.7)
ALBUMIN/GLOB SERPL: 2 {RATIO} (ref 1.2–2.2)
ALP SERPL-CCNC: 100 IU/L (ref 39–117)
ALT SERPL-CCNC: 23 IU/L (ref 0–32)
AST SERPL-CCNC: 25 IU/L (ref 0–40)
BILIRUB SERPL-MCNC: 0.3 MG/DL (ref 0–1.2)
BUN SERPL-MCNC: 14 MG/DL (ref 8–27)
BUN/CREAT SERPL: 19 (ref 12–28)
CALCIUM SERPL-MCNC: 9.3 MG/DL (ref 8.7–10.3)
CHLORIDE SERPL-SCNC: 99 MMOL/L (ref 96–106)
CHOLEST SERPL-MCNC: 141 MG/DL (ref 100–199)
CO2 SERPL-SCNC: 28 MMOL/L (ref 20–29)
CREAT SERPL-MCNC: 0.72 MG/DL (ref 0.57–1)
ERYTHROCYTE [DISTWIDTH] IN BLOOD BY AUTOMATED COUNT: 14.4 % (ref 12.3–15.4)
EST. AVERAGE GLUCOSE BLD GHB EST-MCNC: 126 MG/DL
GLOBULIN SER CALC-MCNC: 2.2 G/DL (ref 1.5–4.5)
GLUCOSE SERPL-MCNC: 170 MG/DL (ref 65–99)
HBA1C MFR BLD: 6 % (ref 4.8–5.6)
HCT VFR BLD AUTO: 39.5 % (ref 34–46.6)
HDLC SERPL-MCNC: 56 MG/DL
HGB BLD-MCNC: 12.8 G/DL (ref 11.1–15.9)
LDLC SERPL CALC-MCNC: 68 MG/DL (ref 0–99)
MCH RBC QN AUTO: 31.1 PG (ref 26.6–33)
MCHC RBC AUTO-ENTMCNC: 32.4 G/DL (ref 31.5–35.7)
MCV RBC AUTO: 96 FL (ref 79–97)
PLATELET # BLD AUTO: 236 X10E3/UL (ref 150–379)
POTASSIUM SERPL-SCNC: 3.7 MMOL/L (ref 3.5–5.2)
PROT SERPL-MCNC: 6.5 G/DL (ref 6–8.5)
RBC # BLD AUTO: 4.11 X10E6/UL (ref 3.77–5.28)
SODIUM SERPL-SCNC: 143 MMOL/L (ref 134–144)
TRIGL SERPL-MCNC: 84 MG/DL (ref 0–149)
TSH SERPL DL<=0.005 MIU/L-ACNC: 2.99 UIU/ML (ref 0.45–4.5)
URATE SERPL-MCNC: 6.2 MG/DL (ref 2.5–7.1)
VLDLC SERPL CALC-MCNC: 17 MG/DL (ref 5–40)
WBC # BLD AUTO: 8.3 X10E3/UL (ref 3.4–10.8)

## 2019-05-09 ENCOUNTER — OFFICE VISIT (OUTPATIENT)
Dept: CARDIOLOGY CLINIC | Age: 84
End: 2019-05-09

## 2019-05-09 VITALS
SYSTOLIC BLOOD PRESSURE: 122 MMHG | WEIGHT: 165.6 LBS | OXYGEN SATURATION: 98 % | HEART RATE: 55 BPM | RESPIRATION RATE: 16 BRPM | HEIGHT: 67 IN | DIASTOLIC BLOOD PRESSURE: 68 MMHG | BODY MASS INDEX: 25.99 KG/M2

## 2019-05-09 DIAGNOSIS — I25.83 CORONARY ARTERY DISEASE DUE TO LIPID RICH PLAQUE: Primary | ICD-10-CM

## 2019-05-09 DIAGNOSIS — Z95.820 S/P ANGIOPLASTY WITH STENT: ICD-10-CM

## 2019-05-09 DIAGNOSIS — I10 ESSENTIAL HYPERTENSION, BENIGN: ICD-10-CM

## 2019-05-09 DIAGNOSIS — Z98.61 POSTSURGICAL PERCUTANEOUS TRANSLUMINAL CORONARY ANGIOPLASTY STATUS: ICD-10-CM

## 2019-05-09 DIAGNOSIS — I25.10 ATHEROSCLEROSIS OF NATIVE CORONARY ARTERY OF NATIVE HEART WITHOUT ANGINA PECTORIS: ICD-10-CM

## 2019-05-09 DIAGNOSIS — I25.10 CORONARY ARTERY DISEASE DUE TO LIPID RICH PLAQUE: Primary | ICD-10-CM

## 2019-05-09 DIAGNOSIS — E78.2 MIXED HYPERLIPIDEMIA: ICD-10-CM

## 2019-05-09 RX ORDER — IBUPROFEN 800 MG/1
TABLET ORAL
Refills: 0 | COMMUNITY
Start: 2019-03-27 | End: 2019-08-13

## 2019-05-09 RX ORDER — AMOXICILLIN 500 MG/1
CAPSULE ORAL
Refills: 0 | COMMUNITY
Start: 2019-03-27 | End: 2019-05-09 | Stop reason: ALTCHOICE

## 2019-05-09 RX ORDER — CHLORTHALIDONE 25 MG/1
TABLET ORAL
Qty: 90 TAB | Refills: 4 | Status: SHIPPED | OUTPATIENT
Start: 2019-05-09 | End: 2020-06-24

## 2019-05-09 RX ORDER — ATORVASTATIN CALCIUM 20 MG/1
TABLET, FILM COATED ORAL
Qty: 90 TAB | Refills: 4 | Status: SHIPPED | OUTPATIENT
Start: 2019-05-09 | End: 2020-06-24

## 2019-05-09 RX ORDER — TRAMADOL HYDROCHLORIDE 50 MG/1
TABLET ORAL
Refills: 0 | COMMUNITY
Start: 2019-03-27 | End: 2019-08-13

## 2019-05-09 RX ORDER — METOPROLOL TARTRATE 50 MG/1
TABLET ORAL
Qty: 180 TAB | Refills: 4 | Status: SHIPPED | OUTPATIENT
Start: 2019-05-09 | End: 2020-06-24

## 2019-05-09 NOTE — PROGRESS NOTES
1. Have you been to the ER, urgent care clinic since your last visit? Hospitalized since your last visit? Yes When: on 12/2018 at [facility]    2. Have you seen or consulted any other health care providers outside of the [address] [place] [address] since your last visit? Include any pap smears or colon screening.  No     Chief Complaint   Patient presents with    Cholesterol Problem     annual follow up    Hypertension     annual follow up

## 2019-05-09 NOTE — PROGRESS NOTES
[address] [facility], [address] [facility]  [phone]     Subjective:      Ike Caballero is a 82 y.o. female is here for routine f/u. The patient denies chest pain/ shortness of breath, orthopnea, PND, LE edema, palpitations, syncope, or presyncope. Walks a mile every other day. Does a lot of gardening. Eating healthy. In December, she fell in a pothole and hit her head, causing a small amount of intracranial hemorrhage. She was hospitalized overnight at [address] [place] [address] and Plavix was stopped. Patient Active Problem List    Diagnosis Date Noted    Diabetes mellitus without complication ([facility] [address] 82/33/2331    Left carotid bruit 01/16/2015    Closed bimalleolar fracture of right ankle 10/18/2013    Gout 06/11/2013    Mitral valve disorders(424.0) 05/08/2012    Mixed hyperlipidemia 03/07/2012    Essential hypertension, benign 03/07/2012    Postsurgical percutaneous transluminal coronary angioplasty status 03/07/2012    S/P Stent LAD (LISSA) 03/07/2012    Coronary atherosclerosis of native coronary artery 03/07/2012    Borderline diabetes 03/07/2012    Hypertension 09/16/2009    Hyperlipidemia 09/16/2009    Diabetes ([facility] [address] 09/16/2009    CAD (coronary artery disease) 09/16/2009      Russell Pinto MD  Past Medical History:   Diagnosis Date    CAD (coronary artery disease) 9/16/2009    EF=65-70%; Mild-mod MR; Dr Bard Alfaro    Diabetes [facility]) 9/16/2009    boarder line controlle by diet and exercise.      Hyperlipidemia 9/16/2009    Hypertension 9/16/2009      Past Surgical History:   Procedure Laterality Date    CARDIAC SURG PROCEDURE UNLIST  2008    cardiac stent    HX ORTHOPAEDIC  10/2013    right ankle    HX ORTHOPAEDIC  2/20/14    INCISION AND DRAINAGE RIGHT ANKLE AND HARDWARE REMOVAL     Allergies   Allergen Reactions    Crestor [Rosuvastatin] Unknown (comments)      Family History   Problem Relation Age of Onset    Hypertension Mother     Heart Disease Mother    [facility] Hypertension Father     Heart Disease Father       Social History     Socioeconomic History    Marital status:      Spouse name: Not on file    Number of children: Not on file    Years of education: Not on file    Highest education level: Not on file   Occupational History    Not on file   Social Needs    Financial resource strain: Not on file    Food insecurity:     Worry: Not on file     Inability: Not on file    Transportation needs:     Medical: Not on file     Non-medical: Not on file   Tobacco Use    Smoking status: Never Smoker    Smokeless tobacco: Never Used   Substance and Sexual Activity    Alcohol use: No    Drug use: No    Sexual activity: Yes     Partners: Male     Birth control/protection: None   Lifestyle    Physical activity:     Days per week: Not on file     Minutes per session: Not on file    Stress: Not on file   Relationships    Social connections:     Talks on phone: Not on file     Gets together: Not on file     Attends [employer] service: Not on file     Active member of club or organization: Not on file     Attends meetings of clubs or organizations: Not on file     Relationship status: Not on file    Intimate partner violence:     Fear of current or ex partner: Not on file     Emotionally abused: Not on file     Physically abused: Not on file     Forced sexual activity: Not on file   Other Topics Concern    Not on file   Social History Narrative    Not on file      Current Outpatient Medications   Medication Sig    ASPIRIN LOW DOSE PO Take  by mouth daily.  varicella-zoster recombinant, PF, (SHINGRIX, PF,) 50 mcg/0.5 mL susr injection 0.5mL by IntraMUSCular route once now and then repeat in 2-6 months    amLODIPine-benazepril (LOTREL) 5-20 mg per capsule TAKE ONE CAPSULE BY MOUTH ONCE DAILY.     atorvastatin (LIPITOR) 20 mg tablet TAKE 1 TABLET BY MOUTH EVERY NIGHT AT BEDTIME    metoprolol tartrate (LOPRESSOR) 50 mg tablet TAKE 1 TABLET BY MOUTH TWICE DAILY  chlorthalidone (HYGROTEN) 25 mg tablet TAKE 1 TABLET BY MOUTH DAILY.  MULTIVITAMIN PO Take  by mouth daily as needed.  ibuprofen (MOTRIN) 800 mg tablet TK 1 T PO Q 8 H MDD 4 TS    traMADol (ULTRAM) 50 mg tablet TK 1 T PO Q 6 H PRN FOR PAIN MDD 10 TS    allopurinol (ZYLOPRIM) 100 mg tablet TAKE 1 TABLET BY MOUTH DAILY     No current facility-administered medications for this visit. Review of Symptoms:  11 systems reviewed, negative other than as stated in the HPI    Physical ExamPhysical Exam:    Vitals:    05/09/19 0904 05/09/19 0919   BP: 132/70 122/68   Pulse: (!) 55    Resp: 16    SpO2: 98%    Weight: 165 lb 9.6 oz (75.1 kg)    Height: 5' 7\" (1.702 m)      Body mass index is 25.94 kg/m². General PE   Gen:  NAD  Mental Status - Alert. General Appearance - Not in acute distress. Chest and Lung Exam   Inspection: Accessory muscles - No use of accessory muscles in breathing. Auscultation:   Breath sounds: - Normal.   Cardiovascular   Inspection: Jugular vein - Bilateral - Inspection Normal.   Palpation/Percussion:   Apical Impulse: - Normal.   Auscultation: Rhythm - Regular. Heart Sounds - S1 WNL and S2 WNL. No S3 or S4. Murmurs & Other Heart Sounds: Auscultation of the heart reveals - No Murmurs. Peripheral Vascular   Upper Extremity: Inspection - Bilateral - No Cyanotic nailbeds or Digital clubbing. Lower Extremity:   Palpation: Edema - Bilateral - No edema. Abdomen:   Soft, non-tender, bowel sounds are active.   Neuro: A&O times 3, CN and motor grossly WNL    Labs:   Lab Results   Component Value Date/Time    Cholesterol, total 141 04/23/2019 08:17 AM    Cholesterol, total 153 09/04/2018 09:18 AM    Cholesterol, total 146 10/24/2017 08:26 AM    Cholesterol, total 151 01/13/2017 09:03 AM    Cholesterol, total 147 07/12/2016 10:02 AM    HDL Cholesterol 56 04/23/2019 08:17 AM    HDL Cholesterol 60 09/04/2018 09:18 AM    HDL Cholesterol 56 10/24/2017 08:26 AM    HDL Cholesterol 62 01/13/2017 09:03 AM    HDL Cholesterol 63 07/12/2016 10:02 AM    LDL, calculated 68 04/23/2019 08:17 AM    LDL, calculated 74 09/04/2018 09:18 AM    LDL, calculated 67 10/24/2017 08:26 AM    LDL, calculated 73 01/13/2017 09:03 AM    LDL, calculated 64 07/12/2016 10:02 AM    Triglyceride 84 04/23/2019 08:17 AM    Triglyceride 95 09/04/2018 09:18 AM    Triglyceride 115 10/24/2017 08:26 AM    Triglyceride 82 01/13/2017 09:03 AM    Triglyceride 102 07/12/2016 10:02 AM    CHOL/HDL Ratio 2.6 09/15/2010 07:53 AM    CHOL/HDL Ratio 2.8 02/16/2010 08:30 AM    CHOL/HDL Ratio 3.2 09/09/2009 08:12 AM     Lab Results   Component Value Date/Time    CK 52 02/23/2014 03:35 AM     Lab Results   Component Value Date/Time    Sodium 143 04/23/2019 08:17 AM    Potassium 3.7 04/23/2019 08:17 AM    Chloride 99 04/23/2019 08:17 AM    CO2 28 04/23/2019 08:17 AM    Anion gap 9 12/29/2018 10:10 PM    Glucose 170 (H) 04/23/2019 08:17 AM    BUN 14 04/23/2019 08:17 AM    Creatinine 0.72 04/23/2019 08:17 AM    BUN/Creatinine ratio 19 04/23/2019 08:17 AM    GFR est AA 90 04/23/2019 08:17 AM    GFR est non-AA 78 04/23/2019 08:17 AM    Calcium 9.3 04/23/2019 08:17 AM    Bilirubin, total 0.3 04/23/2019 08:17 AM    AST (SGOT) 25 04/23/2019 08:17 AM    Alk. phosphatase 100 04/23/2019 08:17 AM    Protein, total 6.5 04/23/2019 08:17 AM    Albumin 4.3 04/23/2019 08:17 AM    Globulin 4.0 12/29/2018 10:10 PM    A-G Ratio 2.0 04/23/2019 08:17 AM    ALT (SGPT) 23 04/23/2019 08:17 AM       EKG:  SR     Assessment:        1. Coronary artery disease due to lipid rich plaque    2. Mixed hyperlipidemia    3. Essential hypertension, benign    4. Postsurgical percutaneous transluminal coronary angioplasty status    5. S/P Stent LAD (LISSA)    6.  Atherosclerosis of native coronary artery of native heart without angina pectoris        Orders Placed This Encounter    AMB POC EKG ROUTINE W/ 12 LEADS, INTER & REP     Order Specific Question:   Reason for Exam:     Answer: Routine    ibuprofen (MOTRIN) 800 mg tablet     Sig: TK 1 T PO Q 8 H MDD 4 TS     Refill:  0    traMADol (ULTRAM) 50 mg tablet     Sig: TK 1 T PO Q 6 H PRN FOR PAIN MDD 10 TS     Refill:  0    DISCONTD: amoxicillin (AMOXIL) 500 mg capsule     Sig: TK 1 C PO TID UNTIL GONE     Refill:  0    ASPIRIN LOW DOSE PO     Sig: Take  by mouth daily. Plan:     Patient presents for follow up, feeling well and stable from cardiac standpoint. 12/18 fell in a pothole and had intracranial bleeding. Normal stress test in 01/15. Echo      CAD:  Doing well with no cardiac symptoms.  Walks a mile every other day. Eats healthy. Continue current care including ASA. Had previously been on Plavix due to history of Cypher stent, but discontinued it due to fall with a small amount of intracranial hemorrhage December 2018. History of ground-level fall 12/18 with a small amount of intracranial hemorrhage:  Off Plavix, continuing aspirin 81 without sequelae.     HLD  Noted FLP with LDL at 68 in April 2019. Continue statin.     HTN:  Normotensive, EKG NSR.        Hx Mild hypokalemia:  Last CMP with K at 3.7  Being followed as she is on Chlorthalidone     Counseled on diet and exercise- eventual goal of 30-60 minutes 5-7 times a week as per AHA guidelines. Exercising regularly.     Doing well with no cardiac symptoms. Continue current care and f/u in 12 months.     Matthew Greenberg MD

## 2019-07-15 RX ORDER — AMLODIPINE AND BENAZEPRIL HYDROCHLORIDE 5; 20 MG/1; MG/1
CAPSULE ORAL
Qty: 90 CAP | Refills: 0 | Status: SHIPPED | OUTPATIENT
Start: 2019-07-15 | End: 2019-10-07 | Stop reason: SDUPTHER

## 2019-08-13 ENCOUNTER — OFFICE VISIT (OUTPATIENT)
Dept: INTERNAL MEDICINE CLINIC | Age: 84
End: 2019-08-13

## 2019-08-13 ENCOUNTER — HOSPITAL ENCOUNTER (OUTPATIENT)
Dept: LAB | Age: 84
Discharge: HOME | End: 2019-08-13
Payer: MEDICARE

## 2019-08-13 VITALS
HEART RATE: 55 BPM | BODY MASS INDEX: 26.21 KG/M2 | OXYGEN SATURATION: 97 % | TEMPERATURE: 98.4 F | RESPIRATION RATE: 16 BRPM | DIASTOLIC BLOOD PRESSURE: 56 MMHG | WEIGHT: 167 LBS | SYSTOLIC BLOOD PRESSURE: 126 MMHG | HEIGHT: 67 IN

## 2019-08-13 DIAGNOSIS — I10 ESSENTIAL HYPERTENSION: ICD-10-CM

## 2019-08-13 DIAGNOSIS — I25.83 CORONARY ARTERY DISEASE DUE TO LIPID RICH PLAQUE: ICD-10-CM

## 2019-08-13 DIAGNOSIS — E11.9 DIABETES MELLITUS WITHOUT COMPLICATION (HCC): Primary | ICD-10-CM

## 2019-08-13 DIAGNOSIS — M1A.9XX0 CHRONIC GOUT WITHOUT TOPHUS, UNSPECIFIED CAUSE, UNSPECIFIED SITE: ICD-10-CM

## 2019-08-13 DIAGNOSIS — E78.2 MIXED HYPERLIPIDEMIA: ICD-10-CM

## 2019-08-13 DIAGNOSIS — I25.10 CORONARY ARTERY DISEASE DUE TO LIPID RICH PLAQUE: ICD-10-CM

## 2019-08-13 PROCEDURE — 82043 UR ALBUMIN QUANTITATIVE: CPT

## 2019-08-13 PROCEDURE — 36415 COLL VENOUS BLD VENIPUNCTURE: CPT

## 2019-08-13 PROCEDURE — 83036 HEMOGLOBIN GLYCOSYLATED A1C: CPT

## 2019-08-13 PROCEDURE — 80048 BASIC METABOLIC PNL TOTAL CA: CPT

## 2019-08-13 NOTE — PATIENT INSTRUCTIONS
Office Policies Phone calls/patient messages: Please allow up to 24 hours for someone in the office to contact you about your call or message. Be mindful your provider may be out of the office or your message may require further review. We encourage you to use [employer] for your messages as this is a faster, more efficient way to communicate with our office Medication Refills: 
         
Prescription medications require 48-72 business hours to process. We encourage you to use [employer] for your refills. For controlled medications: Please allow 72 business hours to process. Certain medications may require you to  a written prescription at our office. NO narcotic/controlled medications will be prescribed after 4pm Monday through Friday or on weekends Form/Paperwork Completion: 
         
Please note a $25 fee may incur for all paperwork for completed by our providers. We ask that you allow 7-10 business days. Pre-payment is due prior to picking up/faxing the completed form. You may also download your forms to [employer] to have your doctor print off.

## 2019-08-13 NOTE — PROGRESS NOTES
HISTORY OF PRESENT ILLNESS  William Guerra is a 82 y.o. female. HPI   Last here 4/23/19. Pt is here to f/u on chronic conditions. Pt brought in all of her pill bottles - reviewed.      BP is 145/56-- repeat 126/56  BP at home 130/61 this AM   Continues on chlorthalidone 25mg daily, metoprolol 50mg BID, and lotrel 5-20mg daily  Pt took her meds this AM      Pt has not been checking her BS recently  No longer needs to  Sugars higher when she was 190lb many years ago now prediabetic range     Wt today is 167 lbs- down 2 lbs x lov   Discussed diet and w/l   Really at goal for her work on wt maintenance     Reviewed labs 4/19   Will get labs today       Pt follows with Dr. Daly Phan (cardio)   Last visit was 5/9/19: Patient presents for follow up, feeling well and stable from cardiac standpoint. 12/18 fell in a pothole and had intracranial bleeding. Normal stress test in 01/15. Echo   CAD:  Doing well with no cardiac symptoms.  Walks a mile every other day. Eats healthy. Continue current care including ASA. Had previously been on Plavix due to history of Cypher stent, but discontinued it due to fall with a small amount of intracranial hemorrhage December 2018. History of ground-level fall 12/18 with a small amount of intracranial hemorrhage:  Off Plavix, continuing aspirin 81 without sequelae. HLD  Noted FLP with LDL at 68 in April 2019. Continue statin. HTN:  Normotensive, EKG NSR.     Hx Mild hypokalemia:  Last CMP with K at 3.7  Being followed as she is on Chlorthalidone  Counseled on diet and exercise- eventual goal of 30-60 minutes 5-7 times a week as per AHA guidelines.  Exercising regularly. Doing well with no cardiac symptoms. Continue current care and f/u in 12 months.   Her plavix was stopped during hospitalization, continues off of this   Continues on ASA 81mg   No signs sx of cad stable      Pt previously followed with Dr. Jony Gross (derm) for eczema  Pt will only f/u with this physician prn No recent flares or itching stable       Continues on lipitor 20mg daily for cholesterol  Recall could not tolerate crestor     No longer on allopurinol 100mg for gout prevention  She has changed her diet which helped  Pt has had no recent flares   Has avoided dietary triggers such as shrimp   Last uric acid level ok             ACP not on file. SDMs are her  Juan Fiore), son and daughter-in-law. Provided information in the past.      PREVENTIVE:    Colonoscopy: declines further  Pap: 9/2010, declines further  Mammogram: declines further  Dexa: declines further  Tdap: 12/2018  Pneumovax: 11/18/2014  Trzejbl84: 4/28/2016  Shingrix: both rounds completed   Flu shot: 01/04/19   Foot exam: 04/23/19   Microalbumin: 9/18  A1c: 9/15 5.9, 1/16 6.1, 7/16 6.1, 1/17 5.9, 4/17 6.0, 10/17 5.8, 4/18 6.0, 9/18 6.5, 4/19 6.0   Vainupea 50, ~4/19 or 5/19   Lipids: 4/19  WIG 85     Patient Active Problem List    Diagnosis Date Noted    Diabetes mellitus without complication ([facility] [address] 82/18/8720    Left carotid bruit 01/16/2015    Gout 06/11/2013    Mitral valve disorders(424.0) 05/08/2012    Mixed hyperlipidemia 03/07/2012    S/P Stent LAD (LISSA) 03/07/2012    Hypertension 09/16/2009    CAD (coronary artery disease) 09/16/2009     Current Outpatient Medications   Medication Sig Dispense Refill    amLODIPine-benazepril (LOTREL) 5-20 mg per capsule TAKE 1 CAPSULE BY MOUTH EVERY DAY 90 Cap 0    ibuprofen (MOTRIN) 800 mg tablet TK 1 T PO Q 8 H MDD 4 TS  0    traMADol (ULTRAM) 50 mg tablet TK 1 T PO Q 6 H PRN FOR PAIN MDD 10 TS  0    ASPIRIN LOW DOSE PO Take  by mouth daily.  atorvastatin (LIPITOR) 20 mg tablet TAKE 1 TABLET BY MOUTH EVERY NIGHT AT BEDTIME 90 Tab 4    metoprolol tartrate (LOPRESSOR) 50 mg tablet TAKE 1 TABLET BY MOUTH TWICE DAILY 180 Tab 4    chlorthalidone (HYGROTEN) 25 mg tablet TAKE 1 TABLET BY MOUTH DAILY.  90 Tab 4    varicella-zoster recombinant, PF, (SHINGRIX, PF,) 50 mcg/0.5 mL susr injection 0.5mL by IntraMUSCular route once now and then repeat in 2-6 months 0.5 mL 1    allopurinol (ZYLOPRIM) 100 mg tablet TAKE 1 TABLET BY MOUTH DAILY 90 Tab 3    MULTIVITAMIN PO Take  by mouth daily as needed. Past Surgical History:   Procedure Laterality Date    CARDIAC SURG PROCEDURE UNLIST  2008    cardiac stent    HX ORTHOPAEDIC  10/2013    right ankle    HX ORTHOPAEDIC  2/20/14    INCISION AND DRAINAGE RIGHT ANKLE AND HARDWARE REMOVAL      Lab Results   Component Value Date/Time    WBC 8.3 04/23/2019 08:17 AM    HGB 12.8 04/23/2019 08:17 AM    HCT 39.5 04/23/2019 08:17 AM    PLATELET 902 07/99/1586 08:17 AM    MCV 96 04/23/2019 08:17 AM     Lab Results   Component Value Date/Time    Cholesterol, total 141 04/23/2019 08:17 AM    HDL Cholesterol 56 04/23/2019 08:17 AM    LDL, calculated 68 04/23/2019 08:17 AM    Triglyceride 84 04/23/2019 08:17 AM    CHOL/HDL Ratio 2.6 09/15/2010 07:53 AM     Lab Results   Component Value Date/Time    GFR est non-AA 78 04/23/2019 08:17 AM    GFR est AA 90 04/23/2019 08:17 AM    Creatinine 0.72 04/23/2019 08:17 AM    BUN 14 04/23/2019 08:17 AM    Sodium 143 04/23/2019 08:17 AM    Potassium 3.7 04/23/2019 08:17 AM    Chloride 99 04/23/2019 08:17 AM    CO2 28 04/23/2019 08:17 AM        Review of Systems   Respiratory: Negative for shortness of breath. Cardiovascular: Negative for chest pain. Physical Exam   Constitutional: She is oriented to person, place, and time. She appears well-developed and well-nourished. No distress. HENT:   Head: Normocephalic and atraumatic. Mouth/Throat: Oropharynx is clear and moist. No oropharyngeal exudate. Eyes: Conjunctivae and EOM are normal. Right eye exhibits no discharge. Left eye exhibits no discharge. Neck: Normal range of motion. Neck supple. Cardiovascular: Normal rate, regular rhythm and normal heart sounds. Exam reveals no gallop and no friction rub.    No murmur heard.  Pulmonary/Chest: Effort normal and breath sounds normal. No respiratory distress. She has no wheezes. She has no rales. She exhibits no tenderness. Musculoskeletal: Normal range of motion. She exhibits no edema, tenderness or deformity. Lymphadenopathy:     She has no cervical adenopathy. Neurological: She is alert and oriented to person, place, and time. Coordination normal.   Skin: Skin is warm and dry. No rash noted. She is not diaphoretic. No erythema. No pallor. Psychiatric: She has a normal mood and affect. Her behavior is normal.       ASSESSMENT and PLAN    ICD-10-CM ICD-9-CM    1. Diabetes mellitus without complication ([facility] [address]              Remains more in the prediabetic range repeat a1c today minimally elevated last check  E11.9 250.00    2. Essential hypertension            Controlled on chlorthalidone metropolol and lotrel continue  I10 401.9    3. Coronary artery disease due to lipid rich plaque              utd with Dr Melida William from 5/19 medically managed stable will f/u in a year  I25.10 414.00     I25.83 414.3    4. Mixed hyperlipidemia              Controlled on lipitor in 4/19 no change to dose  E78.2 272.2    5. Chronic gout without tophus, unspecified cause, unspecified site            Uric acid at goal no longer on allapurinol  M1A. 9XX0 274.02    Depression screen negative      Scribed by Mis Salazar of [address] [place] [address] [address], as dictated by Dr. Tarry Holstein. Current diagnosis and concerns discussed with pt at length. Pt understands risks and benefits or current treatment plan and medications, and accepts the treatment and medication with any possible risks. Pt asks appropriate questions, which were answered. Pt was instructed to call with any concerns or problems. I have reviewed the note documented by the scribe. The services provided are my own.   The documentation is accurate

## 2019-08-14 LAB
ALBUMIN/CREAT UR: 6.5 MG/G CREAT (ref 0–30)
BUN SERPL-MCNC: 19 MG/DL (ref 8–27)
BUN/CREAT SERPL: 24 (ref 12–28)
CALCIUM SERPL-MCNC: 9.8 MG/DL (ref 8.7–10.3)
CHLORIDE SERPL-SCNC: 98 MMOL/L (ref 96–106)
CO2 SERPL-SCNC: 28 MMOL/L (ref 20–29)
CREAT SERPL-MCNC: 0.8 MG/DL (ref 0.57–1)
CREAT UR-MCNC: 97.8 MG/DL
EST. AVERAGE GLUCOSE BLD GHB EST-MCNC: 131 MG/DL
GLUCOSE SERPL-MCNC: 111 MG/DL (ref 65–99)
HBA1C MFR BLD: 6.2 % (ref 4.8–5.6)
MICROALBUMIN UR-MCNC: 6.4 UG/ML
POTASSIUM SERPL-SCNC: 3.9 MMOL/L (ref 3.5–5.2)
SODIUM SERPL-SCNC: 143 MMOL/L (ref 134–144)

## 2019-10-07 RX ORDER — AMLODIPINE AND BENAZEPRIL HYDROCHLORIDE 5; 20 MG/1; MG/1
CAPSULE ORAL
Qty: 90 CAP | Refills: 0 | Status: SHIPPED | OUTPATIENT
Start: 2019-10-07 | End: 2020-01-03

## 2019-10-07 NOTE — TELEPHONE ENCOUNTER
PCP: Joshua Maurice MD    Last appt: 8/13/2019  Future Appointments   Date Time Provider Guido Goldstein   2/14/2020  8:45 AM Joshua Maurice MD [address]   5/11/2020  9:00 AM Lea Hatfield MD [address] [facility]       Requested Prescriptions     Pending Prescriptions Disp Refills    amLODIPine-benazepril (LOTREL) 5-20 mg per capsule 90 Cap 0     Sig: TAKE 1 CAPSULE BY MOUTH EVERY DAY

## 2019-10-07 NOTE — TELEPHONE ENCOUNTER
Pt would like a refill on her script before she goes out of town on Thursday 10/10/19. Please call about 10 am so the patient will be home.      Best # [phone]

## 2020-01-03 RX ORDER — AMLODIPINE AND BENAZEPRIL HYDROCHLORIDE 5; 20 MG/1; MG/1
CAPSULE ORAL
Qty: 90 CAP | Refills: 0 | Status: SHIPPED | OUTPATIENT
Start: 2020-01-03 | End: 2020-03-26 | Stop reason: SDUPTHER

## 2020-02-13 ENCOUNTER — HOSPITAL ENCOUNTER (OUTPATIENT)
Dept: LAB | Age: 85
Discharge: HOME | End: 2020-02-13
Payer: MEDICARE

## 2020-02-13 ENCOUNTER — OFFICE VISIT (OUTPATIENT)
Dept: INTERNAL MEDICINE CLINIC | Age: 85
End: 2020-02-13

## 2020-02-13 VITALS
TEMPERATURE: 97.8 F | DIASTOLIC BLOOD PRESSURE: 66 MMHG | RESPIRATION RATE: 16 BRPM | BODY MASS INDEX: 26.53 KG/M2 | OXYGEN SATURATION: 99 % | SYSTOLIC BLOOD PRESSURE: 118 MMHG | HEIGHT: 67 IN | HEART RATE: 73 BPM | WEIGHT: 169 LBS

## 2020-02-13 DIAGNOSIS — I10 ESSENTIAL HYPERTENSION: ICD-10-CM

## 2020-02-13 DIAGNOSIS — E11.9 DIABETES MELLITUS WITHOUT COMPLICATION (HCC): Primary | ICD-10-CM

## 2020-02-13 DIAGNOSIS — E78.2 MIXED HYPERLIPIDEMIA: ICD-10-CM

## 2020-02-13 DIAGNOSIS — I25.83 CORONARY ARTERY DISEASE DUE TO LIPID RICH PLAQUE: ICD-10-CM

## 2020-02-13 DIAGNOSIS — E66.3 OVERWEIGHT: ICD-10-CM

## 2020-02-13 DIAGNOSIS — R21 RASH: ICD-10-CM

## 2020-02-13 DIAGNOSIS — I25.10 CORONARY ARTERY DISEASE DUE TO LIPID RICH PLAQUE: ICD-10-CM

## 2020-02-13 DIAGNOSIS — M1A.9XX0 CHRONIC GOUT WITHOUT TOPHUS, UNSPECIFIED CAUSE, UNSPECIFIED SITE: ICD-10-CM

## 2020-02-13 PROCEDURE — 84443 ASSAY THYROID STIM HORMONE: CPT

## 2020-02-13 PROCEDURE — 80061 LIPID PANEL: CPT

## 2020-02-13 PROCEDURE — 36415 COLL VENOUS BLD VENIPUNCTURE: CPT

## 2020-02-13 PROCEDURE — 83036 HEMOGLOBIN GLYCOSYLATED A1C: CPT

## 2020-02-13 PROCEDURE — 85027 COMPLETE CBC AUTOMATED: CPT

## 2020-02-13 PROCEDURE — 80053 COMPREHEN METABOLIC PANEL: CPT

## 2020-02-13 RX ORDER — TRIAMCINOLONE ACETONIDE 1 MG/G
CREAM TOPICAL 2 TIMES DAILY
Qty: 15 G | Refills: 0 | Status: SHIPPED | OUTPATIENT
Start: 2020-02-13 | End: 2020-07-24

## 2020-02-14 LAB
ALBUMIN SERPL-MCNC: 4.4 G/DL (ref 3.6–4.6)
ALBUMIN/GLOB SERPL: 2 {RATIO} (ref 1.2–2.2)
ALP SERPL-CCNC: 88 IU/L (ref 39–117)
ALT SERPL-CCNC: 18 IU/L (ref 0–32)
AST SERPL-CCNC: 21 IU/L (ref 0–40)
BILIRUB SERPL-MCNC: 0.4 MG/DL (ref 0–1.2)
BUN SERPL-MCNC: 23 MG/DL (ref 8–27)
BUN/CREAT SERPL: 29 (ref 12–28)
CALCIUM SERPL-MCNC: 9.5 MG/DL (ref 8.7–10.3)
CHLORIDE SERPL-SCNC: 99 MMOL/L (ref 96–106)
CHOLEST SERPL-MCNC: 162 MG/DL (ref 100–199)
CO2 SERPL-SCNC: 27 MMOL/L (ref 20–29)
CREAT SERPL-MCNC: 0.8 MG/DL (ref 0.57–1)
ERYTHROCYTE [DISTWIDTH] IN BLOOD BY AUTOMATED COUNT: 12.8 % (ref 11.7–15.4)
EST. AVERAGE GLUCOSE BLD GHB EST-MCNC: 131 MG/DL
GLOBULIN SER CALC-MCNC: 2.2 G/DL (ref 1.5–4.5)
GLUCOSE SERPL-MCNC: 117 MG/DL (ref 65–99)
HBA1C MFR BLD: 6.2 % (ref 4.8–5.6)
HCT VFR BLD AUTO: 40.6 % (ref 34–46.6)
HDLC SERPL-MCNC: 59 MG/DL
HGB BLD-MCNC: 13.4 G/DL (ref 11.1–15.9)
LDLC SERPL CALC-MCNC: 81 MG/DL (ref 0–99)
MCH RBC QN AUTO: 30.7 PG (ref 26.6–33)
MCHC RBC AUTO-ENTMCNC: 33 G/DL (ref 31.5–35.7)
MCV RBC AUTO: 93 FL (ref 79–97)
PLATELET # BLD AUTO: 236 X10E3/UL (ref 150–450)
POTASSIUM SERPL-SCNC: 3.9 MMOL/L (ref 3.5–5.2)
PROT SERPL-MCNC: 6.6 G/DL (ref 6–8.5)
RBC # BLD AUTO: 4.36 X10E6/UL (ref 3.77–5.28)
SODIUM SERPL-SCNC: 144 MMOL/L (ref 134–144)
TRIGL SERPL-MCNC: 108 MG/DL (ref 0–149)
TSH SERPL DL<=0.005 MIU/L-ACNC: 2.53 UIU/ML (ref 0.45–4.5)
VLDLC SERPL CALC-MCNC: 22 MG/DL (ref 5–40)
WBC # BLD AUTO: 8 X10E3/UL (ref 3.4–10.8)

## 2020-03-26 DIAGNOSIS — I10 ESSENTIAL HYPERTENSION: Primary | ICD-10-CM

## 2020-03-26 RX ORDER — AMLODIPINE AND BENAZEPRIL HYDROCHLORIDE 5; 20 MG/1; MG/1
1 CAPSULE ORAL DAILY
Qty: 90 CAP | Refills: 0 | Status: SHIPPED | OUTPATIENT
Start: 2020-03-26 | End: 2020-06-25

## 2020-03-26 NOTE — TELEPHONE ENCOUNTER
CP: Ann Green MD    Last appt: 2/13/2020  Future Appointments   Date Time Provider Guido Goldstein   5/11/2020  1:15 PM Juliette Hernandez MD Located within [facility]   6/11/2020  8:45 AM Ann Green MD [facility] [address]       Requested Prescriptions     Pending Prescriptions Disp Refills    amLODIPine-benazepril (LOTREL) 5-20 mg per capsule 90 Cap 0     Sig: Take 1 Cap by mouth daily.

## 2020-03-28 ENCOUNTER — HOSPITAL ENCOUNTER (EMERGENCY)
Age: 85
Discharge: HOME | End: 2020-03-28
Attending: EMERGENCY MEDICINE
Payer: MEDICARE

## 2020-03-28 VITALS
SYSTOLIC BLOOD PRESSURE: 153 MMHG | HEIGHT: 66 IN | DIASTOLIC BLOOD PRESSURE: 65 MMHG | BODY MASS INDEX: 27.48 KG/M2 | OXYGEN SATURATION: 100 % | TEMPERATURE: 98 F | HEART RATE: 79 BPM | RESPIRATION RATE: 18 BRPM | WEIGHT: 171 LBS

## 2020-03-28 DIAGNOSIS — Z71.1 CONCERN ABOUT SEXUALLY TRANSMITTED DISEASE IN FEMALE WITHOUT DIAGNOSIS: Primary | ICD-10-CM

## 2020-03-28 PROCEDURE — 99282 EMERGENCY DEPT VISIT SF MDM: CPT

## 2020-03-28 NOTE — DISCHARGE INSTRUCTIONS
Patient Education        Unknown Diagnosis: Care Instructions  Your Care Instructions    One or more doctors have given you a physical exam, reviewed your symptoms, and asked about your past medical problems. You have had tests as needed. At this time, the doctors feel it is safe for you to go home. It is very important that you follow up with your doctor as directed. If you continue to have symptoms, you may need more tests or treatment. The doctor has checked you carefully, but problems can develop later. If you notice any problems or new symptoms,  get medical treatment right away. Follow-up care is a key part of your treatment and safety. Be sure to make and go to all appointments, and call your doctor if you are having problems. It's also a good idea to know your test results and keep a list of the medicines you take. How can you care for yourself at home? · Keep track of any new symptoms or changes in your symptoms. · Rest until you feel better. · Be safe with medicines. Take your medicines exactly as prescribed. Call your doctor if you think you are having a problem with your medicine. · Do not drive after taking a prescription pain medicine. When should you call for help? Call 911 anytime you think you may need emergency care. For example, call if:    · You passed out (lost consciousness).    Call your doctor now or seek immediate medical care if:    · You have new symptoms like fever, difficulty breathing, vomiting, or rash.     · You have new or different pain.     · You are confused and are having trouble thinking clearly.     · Your symptoms are getting worse.    Watch closely for changes in your health, and be sure to contact your doctor if:    · You do not get better as expected. Where can you learn more? Go to http://kailyn-yogesh.info/  Enter R754 in the search box to learn more about \"Unknown Diagnosis: Care Instructions. \"  Current as of: June 26, 2019Content Version: 12.4  © 7991-0431 [employer]. Care instructions adapted under license by [facility] (which disclaims liability or warranty for this information). If you have questions about a medical condition or this instruction, always ask your healthcare professional. [address] any warranty or liability for your use of this information.

## 2020-03-28 NOTE — ED PROVIDER NOTES
EMERGENCY DEPARTMENT HISTORY AND PHYSICAL EXAM      Date: 3/28/2020  Patient Name: Jody Renae    History of Presenting Illness     Chief Complaint   Patient presents with    Vaginal Pain     pt c/o vaginal sore,denies discharge,urinary sx,pt reported her  had penile discharge. Pt with him 52 years. History Provided By: Patient    HPI: Jody Renae, 82 y.o. female presents to the ED with concern for sexually transmitted disease. Patient's  is currently being evaluated in the ED with a chief complaint of \"bumps on his penis. \"  These bumps are reportedly tender to touch. Patient is concerned because she had intercourse with her  2 nights ago before the onset of his bumps. Patient denies symptoms at this time. She does admit that she had dry irritated skin to her inner thigh last week, she is unsure if this is related. She denies vaginal lesions, dysuria, urethral discharge, pelvic pain. She has been monogamous with her  for 47 years and believes that he has been faithful. There are no other complaints, changes, or physical findings at this time. PCP: Neelam Delgado MD    No current facility-administered medications on file prior to encounter. Current Outpatient Medications on File Prior to Encounter   Medication Sig Dispense Refill    amLODIPine-benazepril (LOTREL) 5-20 mg per capsule Take 1 Cap by mouth daily. 90 Cap 0    triamcinolone acetonide (KENALOG) 0.1 % topical cream Apply  to affected area two (2) times a day. use thin layer 15 g 0    atorvastatin (LIPITOR) 20 mg tablet TAKE 1 TABLET BY MOUTH EVERY NIGHT AT BEDTIME 90 Tab 4    metoprolol tartrate (LOPRESSOR) 50 mg tablet TAKE 1 TABLET BY MOUTH TWICE DAILY 180 Tab 4    chlorthalidone (HYGROTEN) 25 mg tablet TAKE 1 TABLET BY MOUTH DAILY. 90 Tab 4    ASPIRIN LOW DOSE PO Take  by mouth daily.          Past History     Past Medical History:  Past Medical History:   Diagnosis Date    CAD (coronary artery disease) 9/16/2009    EF=65-70%; Mild-mod MR; Dr Roseanne Herzog    Diabetes [facility]) 9/16/2009    boarder line controlle by diet and exercise.  Hyperlipidemia 9/16/2009    Hypertension 9/16/2009       Past Surgical History:  Past Surgical History:   Procedure Laterality Date    CARDIAC SURG PROCEDURE UNLIST  2008    cardiac stent    HX ORTHOPAEDIC  10/2013    right ankle    HX ORTHOPAEDIC  2/20/14    INCISION AND DRAINAGE RIGHT ANKLE AND HARDWARE REMOVAL       Family History:  Family History   Problem Relation Age of Onset    Hypertension Mother     Heart Disease Mother     Hypertension Father     Heart Disease Father        Social History:  Social History     Tobacco Use    Smoking status: Never Smoker    Smokeless tobacco: Never Used   Substance Use Topics    Alcohol use: No    Drug use: No       Allergies: Allergies   Allergen Reactions    Crestor [Rosuvastatin] Unknown (comments)         Review of Systems   Review of Systems   Constitutional: Negative for fever. Genitourinary: Negative for dysuria. Skin: Negative for rash. Physical Exam   Physical Exam  Vitals signs and nursing note reviewed. Exam conducted with a chaperone present. Constitutional:       General: She is not in acute distress. Appearance: Normal appearance. She is well-developed. She is not toxic-appearing. HENT:      Head: Normocephalic and atraumatic. Mouth/Throat:      Mouth: Mucous membranes are moist.   Eyes:      General: Lids are normal.      Extraocular Movements: Extraocular movements intact. Conjunctiva/sclera: Conjunctivae normal.   Neck:      Musculoskeletal: Normal range of motion and neck supple. Cardiovascular:      Rate and Rhythm: Normal rate and regular rhythm. Pulmonary:      Effort: Pulmonary effort is normal.      Breath sounds: Normal breath sounds. Abdominal:      Palpations: Abdomen is soft. Tenderness: There is no abdominal tenderness.    Genitourinary:     General: Normal vulva. Labia:         Right: No rash, tenderness or lesion. Left: No rash, tenderness or lesion. Musculoskeletal: Normal range of motion. Skin:     General: Skin is warm and dry. Neurological:      General: No focal deficit present. Mental Status: She is alert and oriented to person, place, and time. Psychiatric:         Behavior: Behavior normal. Behavior is cooperative. Comments: Slightly anxious          Diagnostic Study Results     Labs -   No results found for this or any previous visit (from the past 12 hour(s)). Radiologic Studies -   No orders to display     CT Results  (Last 48 hours)    None        CXR Results  (Last 48 hours)    None            Medical Decision Making   I am the first provider for this patient. I reviewed the vital signs, available nursing notes, past medical history, past surgical history, family history and social history. Vital Signs-Reviewed the patient's vital signs. Patient Vitals for the past 12 hrs:   Temp Pulse Resp BP SpO2   03/28/20 1153 98 °F (36.7 °C) 79 18 153/65 100 %       Records Reviewed: Nursing Notes    Provider Notes (Medical Decision Making):   External genital exam unremarkable. No concerning external lesions. Patient declines internal  exam.  I did not evaluate patient's . Recommended that patient follow-up with her primary care doctor for comprehensive STI testing if desired. Patient voices understanding and agreement with this plan. ED Course:   Initial assessment performed. The patients presenting problems have been discussed, and they are in agreement with the care plan formulated and outlined with them. I have encouraged them to ask questions as they arise throughout their visit. Critical Care Time: None    Disposition:  D/c    PLAN:  1. Discharge Medication List as of 3/28/2020 12:31 PM        2.    Follow-up Information     Follow up With Specialties Details Why Contact Info    [facility] Emergency Medicine  As needed, If symptoms worsen [address] [facility]    Ann Green MD Internal Medicine  For follow up [address] [facility] [address]. [phone]          Return to ED if worse     Diagnosis     Clinical Impression:   1. Concern about sexually transmitted disease in female without diagnosis          Please note that this dictation was completed with [employer], the computer voice recognition software. Quite often unanticipated grammatical, syntax, homophones, and other interpretive errors are inadvertently transcribed by the computer software. Please disregards these errors. Please excuse any errors that have escaped final proofreading. This note will not be viewable in [address]

## 2020-03-28 NOTE — ED NOTES
Pt presents ambulatory to ED complaining of vaginal discomfort x1 day. Pt reports she has been in a monogomous marriage for 47 years. Reports she had sexual intercourse with her  2 nights ago and her  now has discharge and \"bumps\" on his penis. Pt denies vaginal discharge, denies odor. Pt is alert and oriented x 4, RR even and unlabored, skin is warm and dry. Assesment completed and pt updated on plan of care. Emergency Department Nursing Plan of Care       The Nursing Plan of Care is developed from the Nursing assessment and Emergency Department Attending provider initial evaluation. The plan of care may be reviewed in the ED Provider note.     The Plan of Care was developed with the following considerations:   Patient / Family readiness to learn indicated by:verbalized understanding  Persons(s) to be included in education: patient  Barriers to Learning/Limitations:No    Signed     Kirsten Dhillon RN    3/28/2020   12:10 PM

## 2020-06-03 NOTE — TELEPHONE ENCOUNTER
Called, spoke to [facility] (HIPAA). Two pt identifiers confirmed. Man Ask states pt has swelling in R foot, painful. Pt has trouble walking. [id number]. Man Ask states that the swelling is not going down-even after elevating her ankles. Man Ask states TTP and warm to touch and red in color. H/o ankle surgery on R foot about 5yrs ago. [facility]. Man Ask wants an appt for pt. Man Ask offered and accepted appt for 7/16/18 [id number]. Man Ask advised if sx worsen, go to ED. Man Ask verbalized understanding of information discussed w/ no further questions at this time. PRE-OP DIAGNOSIS:  Peripheral vascular disease 03-Jun-2020 10:50:43  Mansoor Barber

## 2020-06-30 NOTE — PROGRESS NOTES
HISTORY OF PRESENT ILLNESS  Kim Miranda is a 82 y.o. female. HPI     Last here 2/13/20. Pt is here to f/u on chronic conditions. This is an established visit completed with telemedicine was completed with video assist  the patient acknowledges and agrees to this method of visitation doxyme      BP is controlled runs 119/67, 119/76,  131/75,  128/68,  Low of 98/80 when she forgot to eat once --brought up her bp after eating some steak  Continues on chlorthalidone 25mg daily, metoprolol 50mg BID, and lotrel 5-20mg daily  Pt took her meds this AM      Pt has not been checking her BS recently  No longer needs to  Sugars higher when she was 190lb many years ago now prediabetic range     Wt was 169 lbs--  Really at goal for her work on wt maintenance  She is walking a lot      Reviewed labs 2/20      Pt follows with Dr. Kade Bellamy (cardio)   Last visit was 5/9/19:  Her plavix was stopped during hospitalization, continues off of this   Continues on ASA 81mg   Has f/u in 8/20   No signs sx of cad stable        Pt previously followed with Dr. Edith Rhoades (derm) for eczema  Pt will only f/u with this physician prn   No recent flares or itching stable  Eczema has been good, not needing cream recently certain triggers make it worse       Continues on lipitor 20mg daily for cholesterol  Recall could not tolerate crestor     No longer on allopurinol 100mg for gout prevention  She has changed her diet which helped  Pt has had no recent flares   Has avoided dietary triggers such as shrimp   Last uric acid level ok      Pt was in ED 3/28/20 for vaginal pain        ACP not on file. SDMs are her  Daryn Pastor, son and daughter-in-law.   Provided information in resent      PREVENTIVE:    Colonoscopy: declines further  Pap: 9/2010, declines further  Mammogram: declines further  Dexa: declines further  Tdap: 12/2018  Pneumovax: 11/18/2014  Ytgazoe24: 4/28/2016  Shingrix: both rounds completed   Flu shot: 9/19   Foot exam: 04/23/19   Microalbumin: 8/19   A1c: , 4/18 6.0, 9/18 6.5, 4/19 6.0 8/19 6.2 2/20 6.2  Eye exam: [facility], 6/6/20   Lipids: 2/20 LDL 82    Patient Active Problem List    Diagnosis Date Noted    Diabetes mellitus without complication ([facility] [address] 01/88/5407    Left carotid bruit 01/16/2015    Gout 06/11/2013    Mitral valve disorders(424.0) 05/08/2012    Mixed hyperlipidemia 03/07/2012    S/P Stent LAD (LISSA) 03/07/2012    Hypertension 09/16/2009    CAD (coronary artery disease) 09/16/2009     Current Outpatient Medications   Medication Sig Dispense Refill    amLODIPine-benazepril (LOTREL) 5-20 mg per capsule TAKE 1 CAPSULE BY MOUTH DAILY 90 Cap 0    atorvastatin (LIPITOR) 20 mg tablet TAKE 1 TABLET BY MOUTH EVERY NIGHT AT BEDTIME 90 Tab 4    chlorthalidone (HYGROTEN) 25 mg tablet TAKE 1 TABLET BY MOUTH DAILY 90 Tab 4    metoprolol tartrate (LOPRESSOR) 50 mg tablet TAKE 1 TABLET BY MOUTH TWICE DAILY 180 Tab 4    triamcinolone acetonide (KENALOG) 0.1 % topical cream Apply  to affected area two (2) times a day. use thin layer 15 g 0    ASPIRIN LOW DOSE PO Take  by mouth daily.        Past Surgical History:   Procedure Laterality Date    CARDIAC SURG PROCEDURE UNLIST  2008    cardiac stent    HX ORTHOPAEDIC  10/2013    right ankle    HX ORTHOPAEDIC  2/20/14    INCISION AND DRAINAGE RIGHT ANKLE AND HARDWARE REMOVAL      Lab Results   Component Value Date/Time    WBC 8.0 02/13/2020 09:53 AM    HGB 13.4 02/13/2020 09:53 AM    HCT 40.6 02/13/2020 09:53 AM    PLATELET 217 90/38/7686 09:53 AM    MCV 93 02/13/2020 09:53 AM     Lab Results   Component Value Date/Time    Cholesterol, total 162 02/13/2020 09:53 AM    HDL Cholesterol 59 02/13/2020 09:53 AM    LDL, calculated 81 02/13/2020 09:53 AM    Triglyceride 108 02/13/2020 09:53 AM    CHOL/HDL Ratio 2.6 09/15/2010 07:53 AM     Lab Results   Component Value Date/Time    GFR est non-AA 69 02/13/2020 09:53 AM    GFR est AA 79 02/13/2020 09:53 AM Creatinine 0.80 02/13/2020 09:53 AM    BUN 23 02/13/2020 09:53 AM    Sodium 144 02/13/2020 09:53 AM    Potassium 3.9 02/13/2020 09:53 AM    Chloride 99 02/13/2020 09:53 AM    CO2 27 02/13/2020 09:53 AM        Review of Systems   Respiratory: Negative for shortness of breath. Cardiovascular: Negative for chest pain. Physical Exam  Constitutional:       Appearance: Normal appearance. She is not toxic-appearing or diaphoretic. HENT:      Head: Normocephalic and atraumatic. Eyes:      Conjunctiva/sclera: Conjunctivae normal.      Pupils: Pupils are equal, round, and reactive to light. Neurological:      Mental Status: She is alert and oriented to person, place, and time. Mental status is at baseline. Gait: Gait normal.   Psychiatric:         Mood and Affect: Mood normal.         Behavior: Behavior normal.         ASSESSMENT and PLAN    ICD-10-CM ICD-9-CM    1. Diabetes mellitus without complication ([facility]) N42.0 250.00 LIPID PANEL       Diet controlled    Check Z1M   METABOLIC PANEL, COMPREHENSIVE      CBC W/O DIFF      HEMOGLOBIN A1C WITH EAG      TSH 3RD GENERATION   2. Essential hypertension      Continues on chlorthalidone 25mg daily, metoprolol 50mg BID, and lotrel 5-20mg daily    Home readings good    No change to dose I10 401.9 LIPID PANEL      METABOLIC PANEL, COMPREHENSIVE      CBC W/O DIFF      HEMOGLOBIN A1C WITH EAG      TSH 3RD GENERATION   3. Coronary artery disease due to lipid rich plaque    Med manadged    On asa    Has f/u [facility] pending I25.10 414.00 LIPID PANEL    Y79.81 957.8 METABOLIC PANEL, COMPREHENSIVE      CBC W/O DIFF      HEMOGLOBIN A1C WITH EAG      TSH 3RD GENERATION   4. Mixed hyperlipidemia      Controlled on lipitor     Check lipids no change to dose E78.2 272.2 LIPID PANEL      METABOLIC PANEL, COMPREHENSIVE      CBC W/O DIFF      HEMOGLOBIN A1C WITH EAG      TSH 3RD GENERATION   5. Chronic gout without tophus, unspecified cause, unspecified site M1A. 9XX0 274.02 LIPID PANEL   No recent flares    Avoids triggers   METABOLIC PANEL, COMPREHENSIVE      CBC W/O DIFF      HEMOGLOBIN A1C WITH EAG      TSH 3RD GENERATION   6. Medicare annual wellness visit, subsequent Z00.00 V70.0 LIPID PANEL      METABOLIC PANEL, COMPREHENSIVE      CBC W/O DIFF      HEMOGLOBIN A1C WITH EAG      TSH 3RD GENERATION   7. S/P Stent LAD (LISSA)    Follows [facility] stable  Z95.820 V45.89 LIPID PANEL      METABOLIC PANEL, COMPREHENSIVE      CBC W/O DIFF      HEMOGLOBIN A1C WITH EAG      TSH 3RD GENERATION          Alice Nazarioulises, who was evaluated through a synchronous (real-time) audio-video encounter, and/or her healthcare decision maker, is aware that it is a billable service, with coverage as determined by her insurance carrier. She provided verbal consent to proceed: Yes, and patient identification was verified. It was conducted pursuant to the emergency declaration under the [facility], [address] [place] [address] [facility] authority and the [employer] General Act. A caregiver was present when appropriate. Ability to conduct physical exam was limited. I was at home. The patient was at home.

## 2020-07-01 ENCOUNTER — VIRTUAL VISIT (OUTPATIENT)
Dept: INTERNAL MEDICINE CLINIC | Age: 85
End: 2020-07-01

## 2020-07-01 DIAGNOSIS — M1A.9XX0 CHRONIC GOUT WITHOUT TOPHUS, UNSPECIFIED CAUSE, UNSPECIFIED SITE: ICD-10-CM

## 2020-07-01 DIAGNOSIS — Z95.820 S/P ANGIOPLASTY WITH STENT: ICD-10-CM

## 2020-07-01 DIAGNOSIS — I25.10 CORONARY ARTERY DISEASE DUE TO LIPID RICH PLAQUE: ICD-10-CM

## 2020-07-01 DIAGNOSIS — E11.9 DIABETES MELLITUS WITHOUT COMPLICATION (HCC): Primary | ICD-10-CM

## 2020-07-01 DIAGNOSIS — E78.2 MIXED HYPERLIPIDEMIA: ICD-10-CM

## 2020-07-01 DIAGNOSIS — I25.83 CORONARY ARTERY DISEASE DUE TO LIPID RICH PLAQUE: ICD-10-CM

## 2020-07-01 DIAGNOSIS — I10 ESSENTIAL HYPERTENSION: ICD-10-CM

## 2020-07-01 DIAGNOSIS — Z00.00 MEDICARE ANNUAL WELLNESS VISIT, SUBSEQUENT: ICD-10-CM

## 2020-07-01 NOTE — PATIENT INSTRUCTIONS
Medicare Wellness Visit, Female The best way to live healthy is to have a lifestyle where you eat a well-balanced diet, exercise regularly, limit alcohol use, and quit all forms of tobacco/nicotine, if applicable. Regular preventive services are another way to keep healthy. Preventive services (vaccines, screening tests, monitoring & exams) can help personalize your care plan, which helps you manage your own care. Screening tests can find health problems at the earliest stages, when they are easiest to treat. [facility] follows the current, evidence-based guidelines published by the [facility] Por[facility] ([facility]) when recommending preventive services for our patients. Because we follow these guidelines, sometimes recommendations change over time as research supports it. (For example, mammograms used to be recommended annually. Even though Medicare will still pay for an annual mammogram, the newer guidelines recommend a mammogram every two years for women of average risk). Of course, you and your doctor may decide to screen more often for some diseases, based on your risk and your co-morbidities (chronic disease you are already diagnosed with). Preventive services for you include: - Medicare offers their members a free annual wellness visit, which is time for you and your primary care provider to discuss and plan for your preventive service needs. Take advantage of this benefit every year! 
-All adults over the age of 72 should receive the recommended pneumonia vaccines. Current USPSTF guidelines recommend a series of two vaccines for the best pneumonia protection.  
-All adults should have a flu vaccine yearly and a tetanus vaccine every 10 years.  
-All adults age 48 and older should receive the shingles vaccines (series of two vaccines). -All adults age 38-68 who are overweight should have a diabetes screening test once every three years. -All adults born between [birth year] and 1963 should be screened once for Hepatitis C. 
-Other screening tests and preventive services for persons with diabetes include: an eye exam to screen for diabetic retinopathy, a kidney function test, a foot exam, and stricter control over your cholesterol.  
-Cardiovascular screening for adults with routine risk involves an electrocardiogram (ECG) at intervals determined by your doctor.  
-Colorectal cancer screenings should be done for adults age 54-65 with no increased risk factors for colorectal cancer. There are a number of acceptable methods of screening for this type of cancer. Each test has its own benefits and drawbacks. Discuss with your doctor what is most appropriate for you during your annual wellness visit. The different tests include: colonoscopy (considered the best screening method), a fecal occult blood test, a fecal DNA test, and sigmoidoscopy. 
 
-A bone mass density test is recommended when a woman turns 65 to screen for osteoporosis. This test is only recommended one time, as a screening. Some providers will use this same test as a disease monitoring tool if you already have osteoporosis. -Breast cancer screenings are recommended every other year for women of normal risk, age 54-69. 
-Cervical cancer screenings for women over age 72 are only recommended with certain risk factors. Here is a list of your current Health Maintenance items (your personalized list of preventive services) with a due date: 
Health Maintenance Due Topic Date Due Jessa Post Eye Exam  09/10/2015 Jessa Post Annual Well Visit  04/23/2020 Jessa Post Diabetic Foot Care  04/23/2020  Glaucoma Screening   05/01/2020

## 2020-07-01 NOTE — PROGRESS NOTES
This is the Subsequent Medicare Annual Wellness Exam, performed 12 months or more after the Initial AWV or the last Subsequent AWV    I have reviewed the patient's medical history in detail and updated the computerized patient record. History     Patient Active Problem List   Diagnosis Code    Hypertension I10    CAD (coronary artery disease) I25.10    Mixed hyperlipidemia E78.2    S/P Stent LAD (LISSA) Z95.820    Mitral valve disorders(424.0) I05.9    Gout M10.9    Left carotid bruit R09.89    Diabetes mellitus without complication (HCC) F54.2     Past Medical History:   Diagnosis Date    CAD (coronary artery disease) 9/16/2009    EF=65-70%; Mild-mod MR; Dr Chris Pittman    Diabetes [facility]) 9/16/2009    boarder line controlle by diet and exercise.  Hyperlipidemia 9/16/2009    Hypertension 9/16/2009      Past Surgical History:   Procedure Laterality Date    CARDIAC SURG PROCEDURE UNLIST  2008    cardiac stent    HX ORTHOPAEDIC  10/2013    right ankle    HX ORTHOPAEDIC  2/20/14    INCISION AND DRAINAGE RIGHT ANKLE AND HARDWARE REMOVAL     Current Outpatient Medications   Medication Sig Dispense Refill    amLODIPine-benazepril (LOTREL) 5-20 mg per capsule TAKE 1 CAPSULE BY MOUTH DAILY 90 Cap 0    atorvastatin (LIPITOR) 20 mg tablet TAKE 1 TABLET BY MOUTH EVERY NIGHT AT BEDTIME 90 Tab 4    chlorthalidone (HYGROTEN) 25 mg tablet TAKE 1 TABLET BY MOUTH DAILY 90 Tab 4    metoprolol tartrate (LOPRESSOR) 50 mg tablet TAKE 1 TABLET BY MOUTH TWICE DAILY 180 Tab 4    triamcinolone acetonide (KENALOG) 0.1 % topical cream Apply  to affected area two (2) times a day. use thin layer 15 g 0    ASPIRIN LOW DOSE PO Take  by mouth daily.        Allergies   Allergen Reactions    Crestor [Rosuvastatin] Unknown (comments)       Family History   Problem Relation Age of Onset    Hypertension Mother     Heart Disease Mother     Hypertension Father     Heart Disease Father      Social History     Tobacco Use    Smoking status: Never Smoker    Smokeless tobacco: Never Used   Substance Use Topics    Alcohol use: No       Depression Risk Factor Screening:     3 most recent PHQ Screens 7/1/2020   Little interest or pleasure in doing things Not at all   Feeling down, depressed, irritable, or hopeless Not at all   Total Score PHQ 2 0       Alcohol Risk Factor Screening:   Do you average 1 drink per night or more than 7 drinks a week:  No    On any one occasion in the past three months have you have had more than 3 drinks containing alcohol:  No  rare    Functional Ability and Level of Safety:   Hearing: Hearing is good. Activities of Daily Living: The home contains: no safety equipment. Patient does total self care     Ambulation: with mild difficulty     Fall Risk:  Fall Risk Assessment, last 12 mths 7/1/2020   Able to walk? Yes   Fall in past 12 months?  No   Fall with injury? -   Number of falls in past 12 months -   Fall Risk Score -     Abuse Screen:  Patient is not abused   safe lives w     Cognitive Screening   Has your family/caregiver stated any concerns about your memory: no    Cognitive Screening: Normal - Mini Cog Test      No memory concerns   Pt knows the month, day and year   Can recall 3/3 objects   Patient Care Team   Patient Care Team:  Chris White MD as PCP - General (Internal Medicine)  Chris White MD as PCP - REHABILITATION [facility] Empaneled Provider  Héctor Wilcox  (Ophthalmology)  Yusra Cantu (Inactive) (Dermatology)  Carlotta Mckeon MD (Cardiology)  Joseph Joyce MD (Infectious Diseases)  barber (Ophthalmology)  Kelvin Guillermo MD (Neurosurgery)     updated    Assessment/Plan   Education and counseling provided:  Are appropriate based on today's review and evaluation  End-of-Life planning (with patient's consent)  Screening Mammography  Screening Pap and pelvic (covered once every 2 years)  Cardiovascular screening blood test  Bone mass measurement (DEXA)  Screening for glaucoma    Diagnoses and all orders for this visit:    1. Diabetes mellitus without complication ([facility] [address]    2. Essential hypertension    3. Coronary artery disease due to lipid rich plaque    4. Mixed hyperlipidemia    5. Chronic gout without tophus, unspecified cause, unspecified site    6. Medicare annual wellness visit, subsequent        Health Maintenance Due   Topic Date Due    Eye Exam Retinal or Dilated  09/10/2015    Medicare Yearly Exam  04/23/2020    Foot Exam Q1  04/23/2020    GLAUCOMA SCREENING Q2Y  05/01/2020       ACP not on file. SDMs are her  Shayla Butcher), son and daughter-in-law. Provided information in the past.--will resend        Colonoscopy: declines further  Pap: 9/2010, declines further  Mammogram: declines further  Dexa: declines further      Tdap: 12/2018  Pneumovax: 11/18/2014  [id number]: 4/28/2016  Shingrix: both rounds completed 2019   Flu shot: 9/19       Lipids: 2/20 LDL 82 annual   A1c:  2/20 6.2  Due     Eye exam: [facility] [place], 6/6/20 annual         Jose Odalys, who was evaluated through a synchronous (real-time) audio-video encounter, and/or her healthcare decision maker, is aware that it is a billable service, with coverage as determined by her insurance carrier. She provided verbal consent to proceed: Yes, and patient identification was verified. It was conducted pursuant to the emergency declaration under the [facility], [address] [place] [address] authority and the [employer] Act. A caregiver was present when appropriate. Ability to conduct physical exam was limited. I was at home. The patient was at home.     Ousmane Farah MD

## 2020-07-07 ENCOUNTER — HOSPITAL ENCOUNTER (OUTPATIENT)
Dept: LAB | Age: 85
Discharge: HOME | End: 2020-07-07
Payer: MEDICARE

## 2020-07-07 PROCEDURE — 80053 COMPREHEN METABOLIC PANEL: CPT

## 2020-07-07 PROCEDURE — 84443 ASSAY THYROID STIM HORMONE: CPT

## 2020-07-07 PROCEDURE — 85027 COMPLETE CBC AUTOMATED: CPT

## 2020-07-07 PROCEDURE — 83036 HEMOGLOBIN GLYCOSYLATED A1C: CPT

## 2020-07-07 PROCEDURE — 36415 COLL VENOUS BLD VENIPUNCTURE: CPT

## 2020-07-07 PROCEDURE — 80061 LIPID PANEL: CPT

## 2020-07-08 LAB
ALBUMIN SERPL-MCNC: 4.4 G/DL (ref 3.6–4.6)
ALBUMIN/GLOB SERPL: 1.7 {RATIO} (ref 1.2–2.2)
ALP SERPL-CCNC: 98 IU/L (ref 39–117)
ALT SERPL-CCNC: 21 IU/L (ref 0–32)
AST SERPL-CCNC: 27 IU/L (ref 0–40)
BILIRUB SERPL-MCNC: 0.5 MG/DL (ref 0–1.2)
BUN SERPL-MCNC: 20 MG/DL (ref 8–27)
BUN/CREAT SERPL: 21 (ref 12–28)
CALCIUM SERPL-MCNC: 9.6 MG/DL (ref 8.7–10.3)
CHLORIDE SERPL-SCNC: 99 MMOL/L (ref 96–106)
CHOLEST SERPL-MCNC: 158 MG/DL (ref 100–199)
CO2 SERPL-SCNC: 26 MMOL/L (ref 20–29)
CREAT SERPL-MCNC: 0.96 MG/DL (ref 0.57–1)
ERYTHROCYTE [DISTWIDTH] IN BLOOD BY AUTOMATED COUNT: 12.8 % (ref 11.7–15.4)
EST. AVERAGE GLUCOSE BLD GHB EST-MCNC: 137 MG/DL
GLOBULIN SER CALC-MCNC: 2.6 G/DL (ref 1.5–4.5)
GLUCOSE SERPL-MCNC: 109 MG/DL (ref 65–99)
HBA1C MFR BLD: 6.4 % (ref 4.8–5.6)
HCT VFR BLD AUTO: 38 % (ref 34–46.6)
HDLC SERPL-MCNC: 61 MG/DL
HGB BLD-MCNC: 13.1 G/DL (ref 11.1–15.9)
LDLC SERPL CALC-MCNC: 80 MG/DL (ref 0–99)
MCH RBC QN AUTO: 31.3 PG (ref 26.6–33)
MCHC RBC AUTO-ENTMCNC: 34.5 G/DL (ref 31.5–35.7)
MCV RBC AUTO: 91 FL (ref 79–97)
PLATELET # BLD AUTO: 238 X10E3/UL (ref 150–450)
POTASSIUM SERPL-SCNC: 4.2 MMOL/L (ref 3.5–5.2)
PROT SERPL-MCNC: 7 G/DL (ref 6–8.5)
RBC # BLD AUTO: 4.19 X10E6/UL (ref 3.77–5.28)
SODIUM SERPL-SCNC: 142 MMOL/L (ref 134–144)
TRIGL SERPL-MCNC: 84 MG/DL (ref 0–149)
TSH SERPL DL<=0.005 MIU/L-ACNC: 1.81 UIU/ML (ref 0.45–4.5)
VLDLC SERPL CALC-MCNC: 17 MG/DL (ref 5–40)
WBC # BLD AUTO: 10.3 X10E3/UL (ref 3.4–10.8)

## 2020-07-24 ENCOUNTER — OFFICE VISIT (OUTPATIENT)
Dept: CARDIOLOGY CLINIC | Age: 85
End: 2020-07-24

## 2020-07-24 VITALS
BODY MASS INDEX: 27.11 KG/M2 | HEART RATE: 52 BPM | RESPIRATION RATE: 16 BRPM | OXYGEN SATURATION: 98 % | DIASTOLIC BLOOD PRESSURE: 60 MMHG | HEIGHT: 66 IN | WEIGHT: 168.7 LBS | SYSTOLIC BLOOD PRESSURE: 140 MMHG

## 2020-07-24 DIAGNOSIS — I25.10 ASHD (ARTERIOSCLEROTIC HEART DISEASE): Primary | ICD-10-CM

## 2020-07-24 DIAGNOSIS — I10 ESSENTIAL HYPERTENSION, BENIGN: ICD-10-CM

## 2020-07-24 DIAGNOSIS — Z98.61 POSTSURGICAL PERCUTANEOUS TRANSLUMINAL CORONARY ANGIOPLASTY STATUS: ICD-10-CM

## 2020-07-24 DIAGNOSIS — Z95.820 S/P ANGIOPLASTY WITH STENT: ICD-10-CM

## 2020-07-24 DIAGNOSIS — E78.2 MIXED HYPERLIPIDEMIA: ICD-10-CM

## 2020-07-24 NOTE — PROGRESS NOTES
Chief Complaint   Patient presents with    Follow-up    Coronary Artery Disease    Cholesterol Problem    Hypertension     1. Have you been to the ER, urgent care clinic since your last visit? Hospitalized since your last visit? Yes 3/2020    2. Have you seen or consulted any other health care providers outside of the [address] [place] [address] since your last visit? Include any pap smears or colon screening.   Dentist 6/2020 & Eye exam  5/2020

## 2020-07-24 NOTE — PROGRESS NOTES
[address], [address] [facility]  [phone]     Subjective:      Yasmine Hernandez is a 82 y.o. female is here for routine f/u. Last seen by us in 5/19  She remains physically active, walks about 2 miles everyday in the morning. Denies any cardiac symptoms. She cuts her own yard and does her housework with no issues. The patient denies chest pain/ shortness of breath, orthopnea, PND, LE edema, palpitations, syncope, or presyncope. Patient Active Problem List    Diagnosis Date Noted    Diabetes mellitus without complication ([facility] [address] 67/10/8430    Left carotid bruit 01/16/2015    Gout 06/11/2013    Mitral valve disorders(424.0) 05/08/2012    Mixed hyperlipidemia 03/07/2012    S/P Stent LAD (LISSA) 03/07/2012    Hypertension 09/16/2009    CAD (coronary artery disease) 09/16/2009      Kenneth Monk MD  Past Medical History:   Diagnosis Date    CAD (coronary artery disease) 9/16/2009    EF=65-70%; Mild-mod MR; Dr Javier Ferrara    Diabetes [facility]) 9/16/2009    boarder line controlle by diet and exercise.      Hyperlipidemia 9/16/2009    Hypertension 9/16/2009      Past Surgical History:   Procedure Laterality Date    CARDIAC SURG PROCEDURE UNLIST  2008    cardiac stent    HX ORTHOPAEDIC  10/2013    right ankle    HX ORTHOPAEDIC  2/20/14    INCISION AND DRAINAGE RIGHT ANKLE AND HARDWARE REMOVAL     Allergies   Allergen Reactions    Crestor [Rosuvastatin] Unknown (comments)      Family History   Problem Relation Age of Onset    Hypertension Mother     Heart Disease Mother     Hypertension Father     Heart Disease Father       Social History     Socioeconomic History    Marital status:      Spouse name: Not on file    Number of children: Not on file    Years of education: Not on file    Highest education level: Not on file   Occupational History    Not on file   Social Needs    Financial resource strain: Not on file    Food insecurity     Worry: Not on file     Inability: Not on file    Transportation needs     Medical: Not on file     Non-medical: Not on file   Tobacco Use    Smoking status: Never Smoker    Smokeless tobacco: Never Used   Substance and Sexual Activity    Alcohol use: No    Drug use: No    Sexual activity: Yes     Partners: Male     Birth control/protection: None   Lifestyle    Physical activity     Days per week: Not on file     Minutes per session: Not on file    Stress: Not on file   Relationships    Social connections     Talks on phone: Not on file     Gets together: Not on file     Attends Anabaptist service: Not on file     Active member of club or organization: Not on file     Attends meetings of clubs or organizations: Not on file     Relationship status: Not on file    Intimate partner violence     Fear of current or ex partner: Not on file     Emotionally abused: Not on file     Physically abused: Not on file     Forced sexual activity: Not on file   Other Topics Concern    Not on file   Social History Narrative    Not on file      Current Outpatient Medications   Medication Sig    metoprolol tartrate (LOPRESSOR) 50 mg tablet TAKE 1 TABLET BY MOUTH TWICE DAILY    atorvastatin (LIPITOR) 20 mg tablet TAKE 1 TABLET BY MOUTH EVERY NIGHT AT BEDTIME    chlorthalidone (HYGROTEN) 25 mg tablet TAKE 1 TABLET BY MOUTH DAILY    amLODIPine-benazepril (LOTREL) 5-20 mg per capsule TAKE 1 CAPSULE BY MOUTH DAILY    ASPIRIN LOW DOSE PO Take  by mouth daily. No current facility-administered medications for this visit. Review of Symptoms:  11 systems reviewed, negative other than as stated in the HPI    Physical ExamPhysical Exam:    Vitals:    07/24/20 0916 07/24/20 0917   BP: 140/70 140/60   Pulse: (!) 52    Resp: 16    SpO2: 98%    Weight: 168 lb 11.2 oz (76.5 kg)    Height: 5' 6\" (1.676 m)      Body mass index is 27.23 kg/m². General PE  Gen:  NAD  Mental Status - Alert. General Appearance - Not in acute distress. HEENT:  PERRL, no carotid bruits or JVD  Chest and Lung Exam   Inspection: Accessory muscles - No use of accessory muscles in breathing. Auscultation:   Breath sounds: - Normal.   Cardiovascular   Inspection: Jugular vein - Bilateral - Inspection Normal.   Palpation/Percussion:   Apical Impulse: - Normal.   Auscultation: Rhythm - Regular. Heart Sounds - S1 WNL and S2 WNL. No S3 or S4. Murmurs & Other Heart Sounds: Auscultation of the heart reveals - No Murmurs. Peripheral Vascular   Upper Extremity: Inspection - Bilateral - No Cyanotic nailbeds or Digital clubbing. Lower Extremity:   Palpation: Edema - Bilateral - No edema. Abdomen:   Soft, non-tender, bowel sounds are active.   Neuro: A&O times 3, CN and motor grossly WNL    Labs:   Lab Results   Component Value Date/Time    Cholesterol, total 158 07/07/2020 09:55 AM    Cholesterol, total 162 02/13/2020 09:53 AM    Cholesterol, total 141 04/23/2019 08:17 AM    Cholesterol, total 153 09/04/2018 09:18 AM    Cholesterol, total 146 10/24/2017 08:26 AM    HDL Cholesterol 61 07/07/2020 09:55 AM    HDL Cholesterol 59 02/13/2020 09:53 AM    HDL Cholesterol 56 04/23/2019 08:17 AM    HDL Cholesterol 60 09/04/2018 09:18 AM    HDL Cholesterol 56 10/24/2017 08:26 AM    LDL, calculated 80 07/07/2020 09:55 AM    LDL, calculated 81 02/13/2020 09:53 AM    LDL, calculated 68 04/23/2019 08:17 AM    LDL, calculated 74 09/04/2018 09:18 AM    LDL, calculated 67 10/24/2017 08:26 AM    Triglyceride 84 07/07/2020 09:55 AM    Triglyceride 108 02/13/2020 09:53 AM    Triglyceride 84 04/23/2019 08:17 AM    Triglyceride 95 09/04/2018 09:18 AM    Triglyceride 115 10/24/2017 08:26 AM    CHOL/HDL Ratio 2.6 09/15/2010 07:53 AM    CHOL/HDL Ratio 2.8 02/16/2010 08:30 AM    CHOL/HDL Ratio 3.2 09/09/2009 08:12 AM     Lab Results   Component Value Date/Time    CK 52 02/23/2014 03:35 AM     Lab Results   Component Value Date/Time    Sodium 142 07/07/2020 09:55 AM    Potassium 4.2 07/07/2020 09:55 AM    Chloride 99 07/07/2020 09:55 AM    CO2 26 07/07/2020 09:55 AM    Anion gap 9 12/29/2018 10:10 PM    Glucose 109 (H) 07/07/2020 09:55 AM    BUN 20 07/07/2020 09:55 AM    Creatinine 0.96 07/07/2020 09:55 AM    BUN/Creatinine ratio 21 07/07/2020 09:55 AM    GFR est AA 63 07/07/2020 09:55 AM    GFR est non-AA 55 (L) 07/07/2020 09:55 AM    Calcium 9.6 07/07/2020 09:55 AM    Bilirubin, total 0.5 07/07/2020 09:55 AM    Alk. phosphatase 98 07/07/2020 09:55 AM    Protein, total 7.0 07/07/2020 09:55 AM    Albumin 4.4 07/07/2020 09:55 AM    Globulin 4.0 12/29/2018 10:10 PM    A-G Ratio 1.7 07/07/2020 09:55 AM    ALT (SGPT) 21 07/07/2020 09:55 AM       EKG:  SB     Assessment:     Assessment:      1. ASHD (arteriosclerotic heart disease)    2. Mixed hyperlipidemia    3. Essential hypertension, benign    4. S/P Stent LAD (LISSA)    5. Postsurgical percutaneous transluminal coronary angioplasty status        Orders Placed This Encounter    AMB POC EKG ROUTINE W/ 12 LEADS, INTER & REP     Order Specific Question:   Reason for Exam:     Answer:   routine        Plan:     Patient presents for follow up. Last seen by us in 5/19  She remains physically active, walks about 2 miles everyday in the morning. Denies any cardiac symptoms. She cuts her own yard and does her housework with no issues. CAD:  Normal stress test in 01/15. Continue current care including ASA. Had previously been on Plavix due to history of Cypher stent, but discontinued it due to fall with a small amount of intracranial hemorrhage December 2018.   On BB, statin      Normal EF mild-mod MR per echo in 2012    HTN  Controlled with current therapy  Stable kidney fxn 7/2020     HLD  7/2020 LDL at 80 On statin Labs and lipids per PCP  LDL at 68 in April 2019.         Hx Mild hypokalemia:  Last CMP with K at 4.2 in 7/2020  Being followed as she is on Chlorthalidone    History of ground-level fall 12/18 with a small amount of intracranial hemorrhage:  Off Plavix, continuing aspirin 81 without sequelae.       Counseled on diet and exercise- eventual goal of 30-60 minutes 5-7 times a week as per AHA guidelines.  Exercising regularly.       Continue current care and f/u in 12 months.       Artur Dempsey MD

## 2020-09-22 DIAGNOSIS — I10 ESSENTIAL HYPERTENSION: ICD-10-CM

## 2020-09-22 RX ORDER — AMLODIPINE AND BENAZEPRIL HYDROCHLORIDE 5; 20 MG/1; MG/1
CAPSULE ORAL
Qty: 90 CAP | Refills: 0 | Status: SHIPPED | OUTPATIENT
Start: 2020-09-22 | End: 2020-12-24

## 2020-10-09 RX ORDER — ATORVASTATIN CALCIUM 20 MG/1
TABLET, FILM COATED ORAL
Qty: 90 TAB | Refills: 0 | OUTPATIENT
Start: 2020-10-09

## 2020-10-09 RX ORDER — METOPROLOL TARTRATE 50 MG/1
TABLET ORAL
Qty: 180 TAB | Refills: 0 | OUTPATIENT
Start: 2020-10-09

## 2020-10-29 NOTE — TELEPHONE ENCOUNTER
Did clarify that patient is taking 20 mg of atorvastatin daily and metoprolol 50 mg twice daily  Does need both refilled.

## 2020-10-30 RX ORDER — ATORVASTATIN CALCIUM 20 MG/1
20 TABLET, FILM COATED ORAL DAILY
Qty: 90 TAB | Refills: 2 | Status: SHIPPED | OUTPATIENT
Start: 2020-10-30 | End: 2021-08-18 | Stop reason: SDUPTHER

## 2020-10-30 RX ORDER — METOPROLOL TARTRATE 50 MG/1
50 TABLET ORAL 2 TIMES DAILY
Qty: 180 TAB | Refills: 2 | Status: SHIPPED | OUTPATIENT
Start: 2020-10-30 | End: 2021-08-25

## 2020-11-24 NOTE — PROGRESS NOTES
HISTORY OF PRESENT ILLNESS  Elizabeth Dong is a 82 y.o. female. HPI     Last here vv 7/1/20. Pt is here to f/u on chronic conditions.      BP today is 155/70, repeat 137/88  BP at home 129/67 131/61 121/76  Continues on chlorthalidone 25mg daily, metoprolol 50mg BID, and lotrel 5-20mg daily - took these this am  She has some wch      Pt has not been checking her BS recently, No longer needs to   Sugars higher when she was 190lb many years ago now prediabetic range     Wt is 171 lbs - up 2 lbs x lov   She is walking a lot (every other day) and watching her diet     Reviewed labs  Will get labs today        Pt follows with Dr. Chris Salinas (cardio)   Reviewed note 7/24/20: Patient presents for follow up. Last seen by us in 5/19  She remains physically active, walks about 2 miles everyday in the morning. Denies any cardiac symptoms. She cuts her own yard and does her housework with no issues. CAD:  Normal stress test in 01/15.    Continue current care including ASA.  Had previously been on Plavix due to history of Cypher stent, but discontinued it due to fall with a small amount of intracranial hemorrhage December 2018. On BB, statin  Normal EF mild-mod MR per echo in 2012  HTN  Controlled with current therapy  Stable kidney fxn 7/2020   HLD  7/2020 LDL at 80 On statin Labs and lipids per PCP  LDL at 68 in April 2019. Hx Mild hypokalemia:  Last CMP with K at 4.2 in 7/2020  Being followed as she is on Chlorthalidone  History of ground-level fall 12/18 with a small amount of intracranial hemorrhage:  Off Plavix, continuing aspirin 81 without sequelae. Counseled on diet and exercise- eventual goal of 30-60 minutes 5-7 times a week as per AHA guidelines.  Exercising regularly. Continue current care and f/u in 12 months.   Continues off plavix  Continues on ASA 81mg   Has f/u in 8/20   No signs sx of cad stable        Pt previously followed with Dr. Viviane Perez (derm) for eczema  Pt will only f/u with this physician prn   No recent flares or itching stable  Eczema has been good, not needing cream recently certain triggers make it worse       Continues on lipitor 20mg daily for cholesterol  Recall could not tolerate crestor     No longer on allopurinol 100mg for gout prevention  She has changed her diet which helped  Pt has had no recent flares 12/20     ACP not on file. SDMs are her  Piyush Garcia, son and daughter-in-law. Provided information in resent      PREVENTIVE:    Colonoscopy: declines further  Pap: 9/2010, declines further  Mammogram: declines further  Dexa: declines further  Tdap: 12/2018  Pneumovax: 11/18/2014  [id number]: 4/28/2016  Shingrix: both rounds completed   Flu shot: 9/19, will get today 12/01/20  Foot exam: 12/01/20  Microalbumin: 8/19   A1c: , 4/18 6.0, 9/18 6.5, 4/19 6.0 8/19 6.2 2/20 6.2 7/20 6.4  Eye exam: [facility], 6/6/20   Lipids: 7/20 LDL 80    Patient Active Problem List    Diagnosis Date Noted    Diabetes mellitus without complication ([facility] [address] 87/16/8253    Left carotid bruit 01/16/2015    Gout 06/11/2013    Mitral valve disorders(424.0) 05/08/2012    Mixed hyperlipidemia 03/07/2012    S/P Stent LAD (LISSA) 03/07/2012    Hypertension 09/16/2009    CAD (coronary artery disease) 09/16/2009     Current Outpatient Medications   Medication Sig Dispense Refill    atorvastatin (LIPITOR) 20 mg tablet Take 1 Tab by mouth daily. 90 Tab 2    metoprolol tartrate (LOPRESSOR) 50 mg tablet Take 1 Tab by mouth two (2) times a day. 180 Tab 2    chlorthalidone (HYGROTEN) 25 mg tablet TAKE 1 TABLET BY MOUTH DAILY 90 Tab 2    amLODIPine-benazepril (LOTREL) 5-20 mg per capsule TAKE 1 CAPSULE BY MOUTH DAILY 90 Cap 0    ASPIRIN LOW DOSE PO Take  by mouth daily.        Past Surgical History:   Procedure Laterality Date    CARDIAC SURG PROCEDURE UNLIST  2008    cardiac stent    HX ORTHOPAEDIC  10/2013    right ankle    HX ORTHOPAEDIC  2/20/14    INCISION AND DRAINAGE RIGHT ANKLE AND HARDWARE REMOVAL      Lab Results   Component Value Date/Time    WBC 10.3 07/07/2020 09:55 AM    HGB 13.1 07/07/2020 09:55 AM    HCT 38.0 07/07/2020 09:55 AM    PLATELET 590 22/89/5799 09:55 AM    MCV 91 07/07/2020 09:55 AM     Lab Results   Component Value Date/Time    Cholesterol, total 158 07/07/2020 09:55 AM    HDL Cholesterol 61 07/07/2020 09:55 AM    LDL, calculated 80 07/07/2020 09:55 AM    Triglyceride 84 07/07/2020 09:55 AM    CHOL/HDL Ratio 2.6 09/15/2010 07:53 AM     Lab Results   Component Value Date/Time    GFR est non-AA 55 (L) 07/07/2020 09:55 AM    GFR est AA 63 07/07/2020 09:55 AM    Creatinine 0.96 07/07/2020 09:55 AM    BUN 20 07/07/2020 09:55 AM    Sodium 142 07/07/2020 09:55 AM    Potassium 4.2 07/07/2020 09:55 AM    Chloride 99 07/07/2020 09:55 AM    CO2 26 07/07/2020 09:55 AM        Review of Systems   Respiratory: Negative for shortness of breath. Cardiovascular: Negative for chest pain. Physical Exam  Constitutional:       General: She is not in acute distress. Appearance: Normal appearance. She is not ill-appearing, toxic-appearing or diaphoretic. HENT:      Head: Normocephalic and atraumatic. Right Ear: External ear normal.      Left Ear: External ear normal.   Eyes:      General:         Right eye: No discharge. Left eye: No discharge. Conjunctiva/sclera: Conjunctivae normal.      Pupils: Pupils are equal, round, and reactive to light. Neck:      Musculoskeletal: Normal range of motion and neck supple. Cardiovascular:      Rate and Rhythm: Normal rate and regular rhythm. Pulses: Normal pulses. Heart sounds: Normal heart sounds. No murmur. No friction rub. No gallop. Pulmonary:      Effort: No respiratory distress. Breath sounds: Normal breath sounds. No wheezing or rales. Chest:      Chest wall: No tenderness. Musculoskeletal: Normal range of motion. Skin:     General: Skin is warm and dry.    Neurological:      Mental Status: She is alert and oriented to person, place, and time. Mental status is at baseline. Coordination: Coordination normal.      Gait: Gait normal.      Comments: Sensory exam of the foot is normal, tested with the monofilament. Good pulses, no lesions or ulcers, good peripheral pulses. Bunions and hammer toes bilaterally   Psychiatric:         Mood and Affect: Mood normal.         Behavior: Behavior normal.         ASSESSMENT and PLAN    ICD-10-CM ICD-9-CM    1. Diabetes mellitus without complication ([facility] [address]         Diet controlled check A1c E11.9 250.00    2. Essential hypertension       Initially elevated repeat improved    Stable at home on chlorthalidone metoprolol and Lotrel continue no change dose I10 401.9    3. Coronary artery disease due to lipid rich plaque  I25.10 414.00    Up-to-date with cardiology medically managed continues on aspirin     I25.83 414.3    4. Mixed hyperlipidemia         Controlled Lipitor 20 mg no change to dose check lipids today E78.2 272.2    5. Chronic gout without tophus, unspecified cause, unspecified site         No recent flares previously was on allopurinol doing well avoids dietary triggers M1A. 9XX0 274.02    6. Intrinsic eczema       Triamcinolone as needed L20.84 691.8       Depression screen reviewed and negative      Scribed by [address], as dictated by Dr. Naima Tang. Current diagnosis and concerns discussed with pt at length. Pt understands risks and benefits or current treatment plan and medications, and accepts the treatment and medication with any possible risks. Pt asks appropriate questions, which were answered. Pt was instructed to call with any concerns or problems. I have reviewed the note documented by the scribe. The services provided are my own.   The documentation is accurate

## 2020-12-01 ENCOUNTER — HOSPITAL ENCOUNTER (OUTPATIENT)
Dept: LAB | Age: 85
Discharge: HOME | End: 2020-12-01
Payer: MEDICARE

## 2020-12-01 ENCOUNTER — OFFICE VISIT (OUTPATIENT)
Dept: INTERNAL MEDICINE CLINIC | Age: 85
End: 2020-12-01
Payer: MEDICARE

## 2020-12-01 VITALS
HEIGHT: 66 IN | BODY MASS INDEX: 27.61 KG/M2 | RESPIRATION RATE: 16 BRPM | SYSTOLIC BLOOD PRESSURE: 137 MMHG | HEART RATE: 64 BPM | DIASTOLIC BLOOD PRESSURE: 88 MMHG | WEIGHT: 171.8 LBS | TEMPERATURE: 97.8 F | OXYGEN SATURATION: 99 %

## 2020-12-01 DIAGNOSIS — E78.2 MIXED HYPERLIPIDEMIA: ICD-10-CM

## 2020-12-01 DIAGNOSIS — M1A.9XX0 CHRONIC GOUT WITHOUT TOPHUS, UNSPECIFIED CAUSE, UNSPECIFIED SITE: ICD-10-CM

## 2020-12-01 DIAGNOSIS — Z23 NEEDS FLU SHOT: ICD-10-CM

## 2020-12-01 DIAGNOSIS — I25.83 CORONARY ARTERY DISEASE DUE TO LIPID RICH PLAQUE: ICD-10-CM

## 2020-12-01 DIAGNOSIS — I10 ESSENTIAL HYPERTENSION: ICD-10-CM

## 2020-12-01 DIAGNOSIS — I25.10 CORONARY ARTERY DISEASE DUE TO LIPID RICH PLAQUE: ICD-10-CM

## 2020-12-01 DIAGNOSIS — E11.9 DIABETES MELLITUS WITHOUT COMPLICATION (HCC): Primary | ICD-10-CM

## 2020-12-01 DIAGNOSIS — L20.84 INTRINSIC ECZEMA: ICD-10-CM

## 2020-12-01 PROCEDURE — 90694 VACC AIIV4 NO PRSRV 0.5ML IM: CPT | Performed by: INTERNAL MEDICINE

## 2020-12-01 PROCEDURE — 1090F PRES/ABSN URINE INCON ASSESS: CPT | Performed by: INTERNAL MEDICINE

## 2020-12-01 PROCEDURE — G8752 SYS BP LESS 140: HCPCS | Performed by: INTERNAL MEDICINE

## 2020-12-01 PROCEDURE — G0463 HOSPITAL OUTPT CLINIC VISIT: HCPCS | Performed by: INTERNAL MEDICINE

## 2020-12-01 PROCEDURE — G8427 DOCREV CUR MEDS BY ELIG CLIN: HCPCS | Performed by: INTERNAL MEDICINE

## 2020-12-01 PROCEDURE — G8419 CALC BMI OUT NRM PARAM NOF/U: HCPCS | Performed by: INTERNAL MEDICINE

## 2020-12-01 PROCEDURE — G8400 PT W/DXA NO RESULTS DOC: HCPCS | Performed by: INTERNAL MEDICINE

## 2020-12-01 PROCEDURE — 82043 UR ALBUMIN QUANTITATIVE: CPT

## 2020-12-01 PROCEDURE — 80053 COMPREHEN METABOLIC PANEL: CPT

## 2020-12-01 PROCEDURE — G8510 SCR DEP NEG, NO PLAN REQD: HCPCS | Performed by: INTERNAL MEDICINE

## 2020-12-01 PROCEDURE — 36415 COLL VENOUS BLD VENIPUNCTURE: CPT

## 2020-12-01 PROCEDURE — 99214 OFFICE O/P EST MOD 30 MIN: CPT | Performed by: INTERNAL MEDICINE

## 2020-12-01 PROCEDURE — 83036 HEMOGLOBIN GLYCOSYLATED A1C: CPT

## 2020-12-01 PROCEDURE — G8754 DIAS BP LESS 90: HCPCS | Performed by: INTERNAL MEDICINE

## 2020-12-01 PROCEDURE — 1101F PT FALLS ASSESS-DOCD LE1/YR: CPT | Performed by: INTERNAL MEDICINE

## 2020-12-01 PROCEDURE — G8536 NO DOC ELDER MAL SCRN: HCPCS | Performed by: INTERNAL MEDICINE

## 2020-12-01 NOTE — PROGRESS NOTES
Thierry Mitchell is a 82 y.o. female who presents for routine immunizations. She denies any symptoms , reactions or allergies that would exclude them from being immunized today. Risks and adverse reactions were discussed and the VIS was given to them. All questions were addressed. She was observed for 5 min post injection. There were no reactions observed.     Sarahy Jean Baptiste

## 2020-12-02 LAB
ALBUMIN SERPL-MCNC: 4.4 G/DL (ref 3.6–4.6)
ALBUMIN/CREAT UR: 4 MG/G CREAT (ref 0–29)
ALBUMIN/GLOB SERPL: 2 {RATIO} (ref 1.2–2.2)
ALP SERPL-CCNC: 111 IU/L (ref 39–117)
ALT SERPL-CCNC: 16 IU/L (ref 0–32)
AST SERPL-CCNC: 24 IU/L (ref 0–40)
BILIRUB SERPL-MCNC: 0.3 MG/DL (ref 0–1.2)
BUN SERPL-MCNC: 14 MG/DL (ref 8–27)
BUN/CREAT SERPL: 18 (ref 12–28)
CALCIUM SERPL-MCNC: 9.6 MG/DL (ref 8.7–10.3)
CHLORIDE SERPL-SCNC: 97 MMOL/L (ref 96–106)
CO2 SERPL-SCNC: 30 MMOL/L (ref 20–29)
CREAT SERPL-MCNC: 0.77 MG/DL (ref 0.57–1)
CREAT UR-MCNC: 125 MG/DL
EST. AVERAGE GLUCOSE BLD GHB EST-MCNC: 128 MG/DL
GLOBULIN SER CALC-MCNC: 2.2 G/DL (ref 1.5–4.5)
GLUCOSE SERPL-MCNC: 151 MG/DL (ref 65–99)
HBA1C MFR BLD: 6.1 % (ref 4.8–5.6)
MICROALBUMIN UR-MCNC: 5.3 UG/ML
POTASSIUM SERPL-SCNC: 4 MMOL/L (ref 3.5–5.2)
PROT SERPL-MCNC: 6.6 G/DL (ref 6–8.5)
SODIUM SERPL-SCNC: 139 MMOL/L (ref 134–144)

## 2020-12-24 DIAGNOSIS — I10 ESSENTIAL HYPERTENSION: ICD-10-CM

## 2020-12-24 RX ORDER — AMLODIPINE AND BENAZEPRIL HYDROCHLORIDE 5; 20 MG/1; MG/1
CAPSULE ORAL
Qty: 90 CAP | Refills: 0 | Status: SHIPPED | OUTPATIENT
Start: 2020-12-24 | End: 2021-03-23

## 2021-03-22 DIAGNOSIS — I10 ESSENTIAL HYPERTENSION: ICD-10-CM

## 2021-03-23 RX ORDER — AMLODIPINE AND BENAZEPRIL HYDROCHLORIDE 5; 20 MG/1; MG/1
CAPSULE ORAL
Qty: 90 CAP | Refills: 0 | Status: SHIPPED | OUTPATIENT
Start: 2021-03-23 | End: 2021-04-29

## 2021-04-21 ENCOUNTER — OFFICE VISIT (OUTPATIENT)
Dept: INTERNAL MEDICINE CLINIC | Age: 86
End: 2021-04-21
Payer: MEDICARE

## 2021-04-21 ENCOUNTER — TELEPHONE (OUTPATIENT)
Dept: INTERNAL MEDICINE CLINIC | Age: 86
End: 2021-04-21

## 2021-04-21 VITALS
HEIGHT: 66 IN | SYSTOLIC BLOOD PRESSURE: 153 MMHG | BODY MASS INDEX: 26.84 KG/M2 | TEMPERATURE: 98.4 F | RESPIRATION RATE: 16 BRPM | WEIGHT: 167 LBS | HEART RATE: 68 BPM | OXYGEN SATURATION: 100 % | DIASTOLIC BLOOD PRESSURE: 66 MMHG

## 2021-04-21 DIAGNOSIS — E11.9 DIABETES MELLITUS WITHOUT COMPLICATION (HCC): Primary | ICD-10-CM

## 2021-04-21 DIAGNOSIS — I25.83 CORONARY ARTERY DISEASE DUE TO LIPID RICH PLAQUE: ICD-10-CM

## 2021-04-21 DIAGNOSIS — I25.10 CORONARY ARTERY DISEASE DUE TO LIPID RICH PLAQUE: ICD-10-CM

## 2021-04-21 DIAGNOSIS — E78.2 MIXED HYPERLIPIDEMIA: ICD-10-CM

## 2021-04-21 DIAGNOSIS — M1A.9XX0 CHRONIC GOUT WITHOUT TOPHUS, UNSPECIFIED CAUSE, UNSPECIFIED SITE: ICD-10-CM

## 2021-04-21 DIAGNOSIS — I10 ESSENTIAL HYPERTENSION: ICD-10-CM

## 2021-04-21 DIAGNOSIS — Z95.820 S/P ANGIOPLASTY WITH STENT: ICD-10-CM

## 2021-04-21 LAB
ALBUMIN SERPL-MCNC: 3.9 G/DL (ref 3.5–5)
ALBUMIN/GLOB SERPL: 1.1 {RATIO} (ref 1.1–2.2)
ALP SERPL-CCNC: 125 U/L (ref 45–117)
ALT SERPL-CCNC: 24 U/L (ref 12–78)
ANION GAP SERPL CALC-SCNC: 6 MMOL/L (ref 5–15)
AST SERPL-CCNC: 21 U/L (ref 15–37)
BILIRUB SERPL-MCNC: 0.5 MG/DL (ref 0.2–1)
BUN SERPL-MCNC: 19 MG/DL (ref 6–20)
BUN/CREAT SERPL: 25 (ref 12–20)
CALCIUM SERPL-MCNC: 9.3 MG/DL (ref 8.5–10.1)
CHLORIDE SERPL-SCNC: 100 MMOL/L (ref 97–108)
CHOLEST SERPL-MCNC: 136 MG/DL
CO2 SERPL-SCNC: 31 MMOL/L (ref 21–32)
COMMENT, HOLDF: NORMAL
CREAT SERPL-MCNC: 0.77 MG/DL (ref 0.55–1.02)
ERYTHROCYTE [DISTWIDTH] IN BLOOD BY AUTOMATED COUNT: 13.7 % (ref 11.5–14.5)
EST. AVERAGE GLUCOSE BLD GHB EST-MCNC: 131 MG/DL
GLOBULIN SER CALC-MCNC: 3.4 G/DL (ref 2–4)
GLUCOSE SERPL-MCNC: 140 MG/DL (ref 65–100)
HBA1C MFR BLD: 6.2 % (ref 4–5.6)
HCT VFR BLD AUTO: 38.2 % (ref 35–47)
HDLC SERPL-MCNC: 66 MG/DL
HDLC SERPL: 2.1 {RATIO} (ref 0–5)
HGB BLD-MCNC: 12 G/DL (ref 11.5–16)
LDLC SERPL CALC-MCNC: 54.4 MG/DL (ref 0–100)
LIPID PROFILE,FLP: NORMAL
MCH RBC QN AUTO: 30.1 PG (ref 26–34)
MCHC RBC AUTO-ENTMCNC: 31.4 G/DL (ref 30–36.5)
MCV RBC AUTO: 95.7 FL (ref 80–99)
NRBC # BLD: 0 K/UL (ref 0–0.01)
NRBC BLD-RTO: 0 PER 100 WBC
PLATELET # BLD AUTO: 274 K/UL (ref 150–400)
PMV BLD AUTO: 12.1 FL (ref 8.9–12.9)
POTASSIUM SERPL-SCNC: 3.9 MMOL/L (ref 3.5–5.1)
PROT SERPL-MCNC: 7.3 G/DL (ref 6.4–8.2)
RBC # BLD AUTO: 3.99 M/UL (ref 3.8–5.2)
SAMPLES BEING HELD,HOLD: NORMAL
SODIUM SERPL-SCNC: 137 MMOL/L (ref 136–145)
TRIGL SERPL-MCNC: 78 MG/DL (ref ?–150)
TSH SERPL DL<=0.05 MIU/L-ACNC: 1.59 UIU/ML (ref 0.36–3.74)
URATE SERPL-MCNC: 8.2 MG/DL (ref 2.6–6)
VLDLC SERPL CALC-MCNC: 15.6 MG/DL
WBC # BLD AUTO: 11.7 K/UL (ref 3.6–11)

## 2021-04-21 PROCEDURE — G8536 NO DOC ELDER MAL SCRN: HCPCS | Performed by: INTERNAL MEDICINE

## 2021-04-21 PROCEDURE — G8427 DOCREV CUR MEDS BY ELIG CLIN: HCPCS | Performed by: INTERNAL MEDICINE

## 2021-04-21 PROCEDURE — 1090F PRES/ABSN URINE INCON ASSESS: CPT | Performed by: INTERNAL MEDICINE

## 2021-04-21 PROCEDURE — G0463 HOSPITAL OUTPT CLINIC VISIT: HCPCS | Performed by: INTERNAL MEDICINE

## 2021-04-21 PROCEDURE — 99214 OFFICE O/P EST MOD 30 MIN: CPT | Performed by: INTERNAL MEDICINE

## 2021-04-21 PROCEDURE — G8400 PT W/DXA NO RESULTS DOC: HCPCS | Performed by: INTERNAL MEDICINE

## 2021-04-21 PROCEDURE — G8753 SYS BP > OR = 140: HCPCS | Performed by: INTERNAL MEDICINE

## 2021-04-21 PROCEDURE — 1101F PT FALLS ASSESS-DOCD LE1/YR: CPT | Performed by: INTERNAL MEDICINE

## 2021-04-21 PROCEDURE — G8510 SCR DEP NEG, NO PLAN REQD: HCPCS | Performed by: INTERNAL MEDICINE

## 2021-04-21 PROCEDURE — G8754 DIAS BP LESS 90: HCPCS | Performed by: INTERNAL MEDICINE

## 2021-04-21 PROCEDURE — G8419 CALC BMI OUT NRM PARAM NOF/U: HCPCS | Performed by: INTERNAL MEDICINE

## 2021-04-21 RX ORDER — PREDNISONE 20 MG/1
TABLET ORAL
Qty: 14 TAB | Refills: 0 | Status: SHIPPED | OUTPATIENT
Start: 2021-04-21 | End: 2021-05-05 | Stop reason: ALTCHOICE

## 2021-04-21 RX ORDER — DIPHENHYDRAMINE HCL 25 MG
25 TABLET ORAL
COMMUNITY

## 2021-04-21 NOTE — PROGRESS NOTES
HISTORY OF PRESENT ILLNESS  Kenneth Powers is a 82 y.o. female. HPI     Last here vv 12/1/20 Pt is here to f/u on chronic conditions.      She c/o gout flare in R x 2 weeks  She ate some fish   It has gone away and come back   She used to take allopurinol, stopped because wasn't having issues -- this is the first flare in 2 years  She has been taking tylenol for pain   On exam: tender to light tough, swelling in base of great toes bilaterally   Will check labs and determine if she needs to go back on allopurinol   Of note she had last moderna 2 weeks ago   Will give prednisone    BP today is 153/73, repeat 153/66 - in pain, will have her monitor at home  BP at home 129/78, it has been higher with pain   Continues on chlorthalidone 25mg daily, metoprolol 50mg BID, and lotrel 5-20mg daily - took these this am  She has some wch      Pt has not been checking her BS recently, No longer needs to      Wt is 167 lbs - down 4 lbs x lov    She is walking a lot (every other day) and watching her diet     Reviewed labs, will get labs today     Pt follows with Dr. Evelin Freeman (cardio) for CAD  Last there 7/24/20  Continues off plavix  Continues on ASA 81mg   Has f/u in 8/20   No signs sx of cad stable        Pt previously followed with Dr. Yudy Mejia (derm) for eczema  Pt will only f/u with this physician prn   No recent flares or itching stable  Eczema has been good, not needing cream recently certain triggers make it worse       Continues on lipitor 20mg daily for cholesterol  Recall could not tolerate crestor     No longer on allopurinol 100mg for gout prevention  She has changed her diet which helped  Pt currently has gout flare -- first flare in 2 years     ACP not on file. SDMs are her  Elina Cruz), son and daughter-in-law.   Provided information in resent      PREVENTIVE:    Colonoscopy: declines further  Pap: 9/2010, declines further  Mammogram: declines further  Dexa: declines further  Tdap: 12/2018  Pneumovax: 11/18/2014  [id number]: 4/28/2016  Shingrix: both rounds completed   Flu shot:  12/01/20  Foot exam: 12/01/20  Microalbumin:12/20  A1c: , 4/18 6.0, 9/18 6.5, 4/19 6.0 8/19 6.2 2/20 6.2 7/20 6.4 12/20 6.1  Eye exam: [facility], 6/6/20   Lipids: 7/20 LDL 80  Covid: both complete (moderna)    Patient Active Problem List    Diagnosis Date Noted    Diabetes mellitus without complication ([facility] [address] 50/08/3201    Left carotid bruit 01/16/2015    Gout 06/11/2013    Mitral valve disorders(424.0) 05/08/2012    Mixed hyperlipidemia 03/07/2012    S/P Stent LAD (LISSA) 03/07/2012    Hypertension 09/16/2009    CAD (coronary artery disease) 09/16/2009     Current Outpatient Medications   Medication Sig Dispense Refill    diphenhydrAMINE (Benadryl Allergy) 25 mg tablet Take 25 mg by mouth every six (6) hours as needed.  predniSONE (DELTASONE) 20 mg tablet 60mg po daily for 2days, 40mg po daily for 2 days, 20mg po daily for 4days then stop 14 Tab 0    amLODIPine-benazepril (LOTREL) 5-20 mg per capsule TAKE 1 CAPSULE BY MOUTH DAILY 90 Cap 0    atorvastatin (LIPITOR) 20 mg tablet Take 1 Tab by mouth daily. 90 Tab 2    metoprolol tartrate (LOPRESSOR) 50 mg tablet Take 1 Tab by mouth two (2) times a day. 180 Tab 2    chlorthalidone (HYGROTEN) 25 mg tablet TAKE 1 TABLET BY MOUTH DAILY 90 Tab 2    ASPIRIN LOW DOSE PO Take  by mouth daily.        Past Surgical History:   Procedure Laterality Date    HX ORTHOPAEDIC  10/2013    right ankle    HX ORTHOPAEDIC  2/20/14    INCISION AND DRAINAGE RIGHT ANKLE AND HARDWARE REMOVAL    CA CARDIAC SURG PROCEDURE UNLIST  2008    cardiac stent      Lab Results   Component Value Date/Time    WBC 10.3 07/07/2020 09:55 AM    HGB 13.1 07/07/2020 09:55 AM    HCT 38.0 07/07/2020 09:55 AM    PLATELET 998 42/15/5639 09:55 AM    MCV 91 07/07/2020 09:55 AM     Lab Results   Component Value Date/Time    Cholesterol, total 158 07/07/2020 09:55 AM    HDL Cholesterol 61 07/07/2020 09:55 AM LDL, calculated 80 07/07/2020 09:55 AM    Triglyceride 84 07/07/2020 09:55 AM    CHOL/HDL Ratio 2.6 09/15/2010 07:53 AM     Lab Results   Component Value Date/Time    GFR est non-AA 72 12/01/2020 08:39 AM    GFR est AA 83 12/01/2020 08:39 AM    Creatinine 0.77 12/01/2020 08:39 AM    BUN 14 12/01/2020 08:39 AM    Sodium 139 12/01/2020 08:39 AM    Potassium 4.0 12/01/2020 08:39 AM    Chloride 97 12/01/2020 08:39 AM    CO2 30 (H) 12/01/2020 08:39 AM        Review of Systems   Respiratory: Negative for shortness of breath. Cardiovascular: Negative for chest pain. Musculoskeletal: Positive for joint pain. Physical Exam  Constitutional:       General: She is not in acute distress. Appearance: Normal appearance. She is not ill-appearing, toxic-appearing or diaphoretic. HENT:      Head: Normocephalic and atraumatic. Right Ear: External ear normal.      Left Ear: External ear normal.   Eyes:      General:         Right eye: No discharge. Left eye: No discharge. Conjunctiva/sclera: Conjunctivae normal.      Pupils: Pupils are equal, round, and reactive to light. Neck:      Musculoskeletal: Normal range of motion and neck supple. Cardiovascular:      Rate and Rhythm: Normal rate and regular rhythm. Pulses: Normal pulses. Heart sounds: Normal heart sounds. No murmur. No friction rub. No gallop. Pulmonary:      Effort: No respiratory distress. Breath sounds: Normal breath sounds. No wheezing or rales. Chest:      Chest wall: No tenderness. Musculoskeletal: Normal range of motion. General: Swelling and tenderness present. Comments:  tender to light tough, swelling in base of great toes bilaterally    Skin:     General: Skin is warm and dry. Neurological:      Mental Status: She is alert and oriented to person, place, and time. Mental status is at baseline.       Coordination: Coordination normal.      Gait: Gait normal.      Comments: Sensory exam of the foot is normal, tested with the monofilament. Good pulses, no lesions or ulcers, good peripheral pulses. Psychiatric:         Mood and Affect: Mood normal.         Behavior: Behavior normal.         ASSESSMENT and PLAN    ICD-10-CM ICD-9-CM    1. Diabetes mellitus without complication ([facility] [address]       Really more of a prediabetic check A1c continue with diet no medication needed for now     E11.9 250.00 LIPID PANEL      METABOLIC PANEL, COMPREHENSIVE      CBC W/O DIFF      TSH 3RD GENERATION      HEMOGLOBIN A1C WITH EAG      URIC ACID   2. Essential hypertension  I10 401.9 LIPID PANEL      METABOLIC PANEL, COMPREHENSIVE   Blood pressure normally well controlled patient is having significant pain in her feet which is likely contributing to her blood pressure elevation at home her readings are good    Continue chlorthalidone metoprolol and Lotrel    Has follow-up in June and will reassess blood pressure control at that time   CBC W/O DIFF      TSH 3RD GENERATION      HEMOGLOBIN A1C WITH EAG      URIC ACID   3. Coronary artery disease due to lipid rich plaque  I25.10 414.00 LIPID PANEL    I22.98 076.5 METABOLIC PANEL, COMPREHENSIVE   Medically managed on aspirin has follow-up with cardiology this summer   CBC W/O DIFF      TSH 3RD GENERATION      HEMOGLOBIN A1C WITH EAG      URIC ACID   4. Mixed hyperlipidemia  E78.2 272.2 LIPID PANEL      METABOLIC PANEL, COMPREHENSIVE   Controlled on Lipitor in the past check lipids adjust dose as needed   CBC W/O DIFF      TSH 3RD GENERATION      HEMOGLOBIN A1C WITH EAG      URIC ACID   5. Chronic gout without tophus, unspecified cause, unspecified site  M1A. 9XX0 274.02 LIPID PANEL   Patient with acute gout flare we will treat with prednisone taper    Previously was on allopurinol but has been off of this for several years has not had a gout flare in several years    Check uric acid level    If having recurrent gout flares will consider restarting allopurinol but not right now during an acute flare   METABOLIC PANEL, COMPREHENSIVE      CBC W/O DIFF      TSH 3RD GENERATION      HEMOGLOBIN A1C WITH EAG      URIC ACID   6. S/P Stent LAD (LISSA)  Z95.820 V45.89 LIPID PANEL      METABOLIC PANEL, COMPREHENSIVE   Now on aspirin follows with cardiology   CBC W/O DIFF      TSH 3RD GENERATION      HEMOGLOBIN A1C WITH EAG      URIC ACID      Depression screen reviewed and negative      Scribed by Evan Rossi of [address] [place] [address] [address], as dictated by Dr. Melissa Eaton. Current diagnosis and concerns discussed with pt at length. Pt understands risks and benefits or current treatment plan and medications, and accepts the treatment and medication with any possible risks. Pt asks appropriate questions, which were answered. Pt was instructed to call with any concerns or problems. I have reviewed the note documented by the scribe. The services provided are my own.   The documentation is accurate

## 2021-04-21 NOTE — TELEPHONE ENCOUNTER
Called, spoke to pt  Received two pt identifiers  Pt offered and accepted appt for 04/21/21 @ 9  Pt verbalizes understanding of the instructions and has no further questions at this time.

## 2021-04-21 NOTE — TELEPHONE ENCOUNTER
----- Message from Mmee Wing sent at 4/21/2021  7:47 AM EDT -----  Regarding: Dr Duffy/telephone  Pt is calling to get appt for Gout in both feet, can hardy walk, please call pt at [phone], please call back this morning    Message from [facility]

## 2021-04-22 NOTE — PROGRESS NOTES
Called, spoke to pt  Pt informed per Dr. Friddie Lesches uric acid level is high --will discuss need to restart /not restart allopurinol at f/u. Pt informed per Dr. Carolina Guerrero also mild elevated likely d/t gout --will repeat labs at F/u  Rest labs are okay  Pt verbalizes understanding of the instructions and has no further questions at this time.

## 2021-04-22 NOTE — PROGRESS NOTES
Please call patient back about results  Let her know uric acid level is high --will discuss need to restart /not restart allopurinol at f/u  Wbc also mild elevated likely d/t gout --will repeat cbc at f/u   Rest stable

## 2021-04-27 NOTE — TELEPHONE ENCOUNTER
Pt states that gout has come back back and she needs refill of :      predniSONE (DELTASONE) 20 mg tablet        Pt states she also needs something for the pain. Please prescribe something for pain.         States please send to:  [facility] [phone] AT [facility]

## 2021-04-27 NOTE — TELEPHONE ENCOUNTER
#[phone]   Pt states that she is still having the gout problem and needs to have another script of prednisone and a script for pain medication sent to [facility] on file. Please call pt back yet today to let her know what Dr. Carlos Gallo will do for her.

## 2021-04-28 ENCOUNTER — HOSPITAL ENCOUNTER (OUTPATIENT)
Dept: NON INVASIVE DIAGNOSTICS | Age: 86
Discharge: HOME | DRG: 392 | End: 2021-04-28
Attending: NURSE PRACTITIONER
Payer: MEDICARE

## 2021-04-28 ENCOUNTER — APPOINTMENT (OUTPATIENT)
Dept: NUCLEAR MEDICINE | Age: 86
DRG: 392 | End: 2021-04-28
Attending: NURSE PRACTITIONER
Payer: MEDICARE

## 2021-04-28 ENCOUNTER — APPOINTMENT (OUTPATIENT)
Dept: GENERAL RADIOLOGY | Age: 86
DRG: 392 | End: 2021-04-28
Attending: STUDENT IN AN ORGANIZED HEALTH CARE EDUCATION/TRAINING PROGRAM
Payer: MEDICARE

## 2021-04-28 ENCOUNTER — HOSPITAL ENCOUNTER (INPATIENT)
Age: 86
LOS: 1 days | Discharge: HOME | DRG: 392 | End: 2021-04-29
Attending: EMERGENCY MEDICINE | Admitting: INTERNAL MEDICINE
Payer: MEDICARE

## 2021-04-28 DIAGNOSIS — I25.83 CORONARY ARTERY DISEASE DUE TO LIPID RICH PLAQUE: ICD-10-CM

## 2021-04-28 DIAGNOSIS — N17.9 AKI (ACUTE KIDNEY INJURY) (HCC): ICD-10-CM

## 2021-04-28 DIAGNOSIS — R07.9 ACUTE CHEST PAIN: ICD-10-CM

## 2021-04-28 DIAGNOSIS — E87.6 HYPOKALEMIA: Primary | ICD-10-CM

## 2021-04-28 DIAGNOSIS — M1A.9XX0 CHRONIC GOUT WITHOUT TOPHUS, UNSPECIFIED CAUSE, UNSPECIFIED SITE: ICD-10-CM

## 2021-04-28 DIAGNOSIS — Z95.820 S/P ANGIOPLASTY WITH STENT: ICD-10-CM

## 2021-04-28 DIAGNOSIS — I25.10 CORONARY ARTERY DISEASE DUE TO LIPID RICH PLAQUE: ICD-10-CM

## 2021-04-28 DIAGNOSIS — I10 ESSENTIAL HYPERTENSION: ICD-10-CM

## 2021-04-28 DIAGNOSIS — E78.2 MIXED HYPERLIPIDEMIA: ICD-10-CM

## 2021-04-28 DIAGNOSIS — E11.9 DIABETES MELLITUS WITHOUT COMPLICATION (HCC): ICD-10-CM

## 2021-04-28 PROBLEM — I20.0 UNSTABLE ANGINA (HCC): Status: ACTIVE | Noted: 2021-04-28

## 2021-04-28 LAB
ALBUMIN SERPL-MCNC: 3.3 G/DL (ref 3.5–5)
ALBUMIN/GLOB SERPL: 0.9 {RATIO} (ref 1.1–2.2)
ALP SERPL-CCNC: 104 U/L (ref 45–117)
ALT SERPL-CCNC: 25 U/L (ref 12–78)
ANION GAP SERPL CALC-SCNC: 8 MMOL/L (ref 5–15)
APTT PPP: 25.1 SEC (ref 22.1–31)
APTT PPP: 53.1 SEC (ref 22.1–31)
APTT PPP: 57 SEC (ref 22.1–31)
AST SERPL-CCNC: 14 U/L (ref 15–37)
ATRIAL RATE: 92 BPM
BASOPHILS # BLD: 0 K/UL (ref 0–0.1)
BASOPHILS # BLD: 0 K/UL (ref 0–0.1)
BASOPHILS NFR BLD: 0 % (ref 0–1)
BASOPHILS NFR BLD: 0 % (ref 0–1)
BILIRUB SERPL-MCNC: 0.4 MG/DL (ref 0.2–1)
BNP SERPL-MCNC: 112 PG/ML
BUN SERPL-MCNC: 31 MG/DL (ref 6–20)
BUN/CREAT SERPL: 25 (ref 12–20)
CALCIUM SERPL-MCNC: 9 MG/DL (ref 8.5–10.1)
CALCULATED P AXIS, ECG09: 107 DEGREES
CALCULATED R AXIS, ECG10: 6 DEGREES
CALCULATED T AXIS, ECG11: 141 DEGREES
CHLORIDE SERPL-SCNC: 94 MMOL/L (ref 97–108)
CHOLEST SERPL-MCNC: 117 MG/DL
CO2 SERPL-SCNC: 29 MMOL/L (ref 21–32)
COMMENT, HOLDF: NORMAL
CREAT SERPL-MCNC: 1.25 MG/DL (ref 0.55–1.02)
DIAGNOSIS, 93000: NORMAL
DIFFERENTIAL METHOD BLD: ABNORMAL
DIFFERENTIAL METHOD BLD: ABNORMAL
EOSINOPHIL # BLD: 0 K/UL (ref 0–0.4)
EOSINOPHIL # BLD: 0.3 K/UL (ref 0–0.4)
EOSINOPHIL NFR BLD: 0 % (ref 0–7)
EOSINOPHIL NFR BLD: 2 % (ref 0–7)
ERYTHROCYTE [DISTWIDTH] IN BLOOD BY AUTOMATED COUNT: 13.4 % (ref 11.5–14.5)
ERYTHROCYTE [DISTWIDTH] IN BLOOD BY AUTOMATED COUNT: 13.6 % (ref 11.5–14.5)
GLOBULIN SER CALC-MCNC: 3.7 G/DL (ref 2–4)
GLUCOSE SERPL-MCNC: 174 MG/DL (ref 65–100)
HCT VFR BLD AUTO: 36.3 % (ref 35–47)
HCT VFR BLD AUTO: 37.4 % (ref 35–47)
HDLC SERPL-MCNC: 57 MG/DL
HDLC SERPL: 2.1 {RATIO} (ref 0–5)
HGB BLD-MCNC: 12.2 G/DL (ref 11.5–16)
HGB BLD-MCNC: 12.2 G/DL (ref 11.5–16)
IMM GRANULOCYTES # BLD AUTO: 0.1 K/UL (ref 0–0.04)
IMM GRANULOCYTES # BLD AUTO: 0.1 K/UL (ref 0–0.04)
IMM GRANULOCYTES NFR BLD AUTO: 1 % (ref 0–0.5)
IMM GRANULOCYTES NFR BLD AUTO: 1 % (ref 0–0.5)
LDLC SERPL CALC-MCNC: 40.6 MG/DL (ref 0–100)
LIPASE SERPL-CCNC: 128 U/L (ref 73–393)
LIPID PROFILE,FLP: NORMAL
LYMPHOCYTES # BLD: 0.9 K/UL (ref 0.8–3.5)
LYMPHOCYTES # BLD: 2.4 K/UL (ref 0.8–3.5)
LYMPHOCYTES NFR BLD: 14 % (ref 12–49)
LYMPHOCYTES NFR BLD: 7 % (ref 12–49)
MCH RBC QN AUTO: 30.2 PG (ref 26–34)
MCH RBC QN AUTO: 30.3 PG (ref 26–34)
MCHC RBC AUTO-ENTMCNC: 32.6 G/DL (ref 30–36.5)
MCHC RBC AUTO-ENTMCNC: 33.6 G/DL (ref 30–36.5)
MCV RBC AUTO: 90.3 FL (ref 80–99)
MCV RBC AUTO: 92.6 FL (ref 80–99)
MONOCYTES # BLD: 0.3 K/UL (ref 0–1)
MONOCYTES # BLD: 1.3 K/UL (ref 0–1)
MONOCYTES NFR BLD: 2 % (ref 5–13)
MONOCYTES NFR BLD: 8 % (ref 5–13)
NEUTS SEG # BLD: 11.8 K/UL (ref 1.8–8)
NEUTS SEG # BLD: 13.3 K/UL (ref 1.8–8)
NEUTS SEG NFR BLD: 75 % (ref 32–75)
NEUTS SEG NFR BLD: 90 % (ref 32–75)
NRBC # BLD: 0 K/UL (ref 0–0.01)
NRBC # BLD: 0 K/UL (ref 0–0.01)
NRBC BLD-RTO: 0 PER 100 WBC
NRBC BLD-RTO: 0 PER 100 WBC
P-R INTERVAL, ECG05: 152 MS
PLATELET # BLD AUTO: 258 K/UL (ref 150–400)
PLATELET # BLD AUTO: 277 K/UL (ref 150–400)
PMV BLD AUTO: 11.2 FL (ref 8.9–12.9)
PMV BLD AUTO: 11.4 FL (ref 8.9–12.9)
POTASSIUM SERPL-SCNC: 2.9 MMOL/L (ref 3.5–5.1)
PROT SERPL-MCNC: 7 G/DL (ref 6.4–8.2)
Q-T INTERVAL, ECG07: 404 MS
QRS DURATION, ECG06: 108 MS
QTC CALCULATION (BEZET), ECG08: 499 MS
RBC # BLD AUTO: 4.02 M/UL (ref 3.8–5.2)
RBC # BLD AUTO: 4.04 M/UL (ref 3.8–5.2)
SAMPLES BEING HELD,HOLD: NORMAL
SODIUM SERPL-SCNC: 131 MMOL/L (ref 136–145)
THERAPEUTIC RANGE,PTTT: ABNORMAL SECS (ref 58–77)
THERAPEUTIC RANGE,PTTT: ABNORMAL SECS (ref 58–77)
THERAPEUTIC RANGE,PTTT: NORMAL SECS (ref 58–77)
TRIGL SERPL-MCNC: 97 MG/DL (ref ?–150)
TROPONIN I SERPL-MCNC: <0.05 NG/ML
VENTRICULAR RATE, ECG03: 92 BPM
VLDLC SERPL CALC-MCNC: 19.4 MG/DL
WBC # BLD AUTO: 13.1 K/UL (ref 3.6–11)
WBC # BLD AUTO: 17.5 K/UL (ref 3.6–11)

## 2021-04-28 PROCEDURE — 65660000000 HC RM CCU STEPDOWN

## 2021-04-28 PROCEDURE — 84484 ASSAY OF TROPONIN QUANT: CPT

## 2021-04-28 PROCEDURE — 74011250637 HC RX REV CODE- 250/637: Performed by: STUDENT IN AN ORGANIZED HEALTH CARE EDUCATION/TRAINING PROGRAM

## 2021-04-28 PROCEDURE — 99223 1ST HOSP IP/OBS HIGH 75: CPT | Performed by: INTERNAL MEDICINE

## 2021-04-28 PROCEDURE — 74011250636 HC RX REV CODE- 250/636: Performed by: INTERNAL MEDICINE

## 2021-04-28 PROCEDURE — 74011000250 HC RX REV CODE- 250: Performed by: NURSE PRACTITIONER

## 2021-04-28 PROCEDURE — 74011636637 HC RX REV CODE- 636/637: Performed by: INTERNAL MEDICINE

## 2021-04-28 PROCEDURE — 74011250637 HC RX REV CODE- 250/637: Performed by: INTERNAL MEDICINE

## 2021-04-28 PROCEDURE — 36415 COLL VENOUS BLD VENIPUNCTURE: CPT

## 2021-04-28 PROCEDURE — 80053 COMPREHEN METABOLIC PANEL: CPT

## 2021-04-28 PROCEDURE — 96375 TX/PRO/DX INJ NEW DRUG ADDON: CPT

## 2021-04-28 PROCEDURE — 71045 X-RAY EXAM CHEST 1 VIEW: CPT

## 2021-04-28 PROCEDURE — 74011250636 HC RX REV CODE- 250/636: Performed by: STUDENT IN AN ORGANIZED HEALTH CARE EDUCATION/TRAINING PROGRAM

## 2021-04-28 PROCEDURE — 78452 HT MUSCLE IMAGE SPECT MULT: CPT

## 2021-04-28 PROCEDURE — 93017 CV STRESS TEST TRACING ONLY: CPT

## 2021-04-28 PROCEDURE — 83690 ASSAY OF LIPASE: CPT

## 2021-04-28 PROCEDURE — 83880 ASSAY OF NATRIURETIC PEPTIDE: CPT

## 2021-04-28 PROCEDURE — 93005 ELECTROCARDIOGRAM TRACING: CPT

## 2021-04-28 PROCEDURE — 96365 THER/PROPH/DIAG IV INF INIT: CPT

## 2021-04-28 PROCEDURE — 74011250636 HC RX REV CODE- 250/636: Performed by: NURSE PRACTITIONER

## 2021-04-28 PROCEDURE — 85730 THROMBOPLASTIN TIME PARTIAL: CPT

## 2021-04-28 PROCEDURE — 99285 EMERGENCY DEPT VISIT HI MDM: CPT

## 2021-04-28 PROCEDURE — 80061 LIPID PANEL: CPT

## 2021-04-28 PROCEDURE — 74011250637 HC RX REV CODE- 250/637: Performed by: NURSE PRACTITIONER

## 2021-04-28 PROCEDURE — 85025 COMPLETE CBC W/AUTO DIFF WBC: CPT

## 2021-04-28 RX ORDER — SODIUM CHLORIDE 0.9 % (FLUSH) 0.9 %
10 SYRINGE (ML) INJECTION AS NEEDED
Status: DISCONTINUED | OUTPATIENT
Start: 2021-04-28 | End: 2021-05-02 | Stop reason: HOSPADM

## 2021-04-28 RX ORDER — AMLODIPINE BESYLATE 5 MG/1
5 TABLET ORAL DAILY
Status: DISCONTINUED | OUTPATIENT
Start: 2021-04-28 | End: 2021-04-29 | Stop reason: HOSPADM

## 2021-04-28 RX ORDER — PREDNISONE 20 MG/1
20 TABLET ORAL
Status: DISCONTINUED | OUTPATIENT
Start: 2021-04-28 | End: 2021-04-29 | Stop reason: HOSPADM

## 2021-04-28 RX ORDER — POTASSIUM CHLORIDE 20 MEQ/1
40 TABLET, EXTENDED RELEASE ORAL
Status: COMPLETED | OUTPATIENT
Start: 2021-04-28 | End: 2021-04-28

## 2021-04-28 RX ORDER — LISINOPRIL 20 MG/1
20 TABLET ORAL DAILY
Status: DISCONTINUED | OUTPATIENT
Start: 2021-04-28 | End: 2021-04-29 | Stop reason: HOSPADM

## 2021-04-28 RX ORDER — HEPARIN SODIUM 10000 [USP'U]/100ML
12-25 INJECTION, SOLUTION INTRAVENOUS
Status: DISCONTINUED | OUTPATIENT
Start: 2021-04-28 | End: 2021-04-29

## 2021-04-28 RX ORDER — CHLORTHALIDONE 25 MG/1
25 TABLET ORAL DAILY
Status: DISCONTINUED | OUTPATIENT
Start: 2021-04-28 | End: 2021-04-29 | Stop reason: HOSPADM

## 2021-04-28 RX ORDER — HEPARIN SODIUM 5000 [USP'U]/ML
2000 INJECTION, SOLUTION INTRAVENOUS; SUBCUTANEOUS AS NEEDED
Status: DISCONTINUED | OUTPATIENT
Start: 2021-04-28 | End: 2021-04-29

## 2021-04-28 RX ORDER — HEPARIN SODIUM 5000 [USP'U]/ML
4000 INJECTION, SOLUTION INTRAVENOUS; SUBCUTANEOUS ONCE
Status: COMPLETED | OUTPATIENT
Start: 2021-04-28 | End: 2021-04-28

## 2021-04-28 RX ORDER — MORPHINE SULFATE 2 MG/ML
4 INJECTION, SOLUTION INTRAMUSCULAR; INTRAVENOUS ONCE
Status: COMPLETED | OUTPATIENT
Start: 2021-04-28 | End: 2021-04-28

## 2021-04-28 RX ORDER — METOPROLOL TARTRATE 50 MG/1
50 TABLET ORAL 2 TIMES DAILY
Status: DISCONTINUED | OUTPATIENT
Start: 2021-04-28 | End: 2021-04-29 | Stop reason: HOSPADM

## 2021-04-28 RX ORDER — MORPHINE SULFATE 2 MG/ML
1 INJECTION, SOLUTION INTRAMUSCULAR; INTRAVENOUS
Status: DISCONTINUED | OUTPATIENT
Start: 2021-04-28 | End: 2021-04-29 | Stop reason: HOSPADM

## 2021-04-28 RX ORDER — POTASSIUM CHLORIDE 7.45 MG/ML
10 INJECTION INTRAVENOUS
Status: COMPLETED | OUTPATIENT
Start: 2021-04-28 | End: 2021-04-28

## 2021-04-28 RX ORDER — SODIUM CHLORIDE 0.9 % (FLUSH) 0.9 %
5-40 SYRINGE (ML) INJECTION EVERY 8 HOURS
Status: DISCONTINUED | OUTPATIENT
Start: 2021-04-28 | End: 2021-04-29 | Stop reason: HOSPADM

## 2021-04-28 RX ORDER — SODIUM CHLORIDE 9 MG/ML
75 INJECTION, SOLUTION INTRAVENOUS CONTINUOUS
Status: DISCONTINUED | OUTPATIENT
Start: 2021-04-28 | End: 2021-04-29

## 2021-04-28 RX ORDER — ATORVASTATIN CALCIUM 20 MG/1
20 TABLET, FILM COATED ORAL DAILY
Status: DISCONTINUED | OUTPATIENT
Start: 2021-04-28 | End: 2021-04-29 | Stop reason: HOSPADM

## 2021-04-28 RX ORDER — HEPARIN SODIUM 5000 [USP'U]/ML
4000 INJECTION, SOLUTION INTRAVENOUS; SUBCUTANEOUS AS NEEDED
Status: DISCONTINUED | OUTPATIENT
Start: 2021-04-28 | End: 2021-04-29

## 2021-04-28 RX ORDER — SODIUM CHLORIDE 0.9 % (FLUSH) 0.9 %
5-40 SYRINGE (ML) INJECTION AS NEEDED
Status: DISCONTINUED | OUTPATIENT
Start: 2021-04-28 | End: 2021-04-29 | Stop reason: HOSPADM

## 2021-04-28 RX ORDER — ASPIRIN 81 MG/1
81 TABLET ORAL DAILY
Status: DISCONTINUED | OUTPATIENT
Start: 2021-04-28 | End: 2021-04-29 | Stop reason: HOSPADM

## 2021-04-28 RX ADMIN — PREDNISONE 20 MG: 20 TABLET ORAL at 09:11

## 2021-04-28 RX ADMIN — Medication 10 ML: at 18:41

## 2021-04-28 RX ADMIN — POTASSIUM CHLORIDE 40 MEQ: 1500 TABLET, EXTENDED RELEASE ORAL at 05:11

## 2021-04-28 RX ADMIN — METOPROLOL TARTRATE 50 MG: 50 TABLET, FILM COATED ORAL at 09:11

## 2021-04-28 RX ADMIN — POTASSIUM CHLORIDE 10 MEQ: 10 INJECTION, SOLUTION INTRAVENOUS at 06:16

## 2021-04-28 RX ADMIN — LISINOPRIL 20 MG: 20 TABLET ORAL at 09:11

## 2021-04-28 RX ADMIN — HEPARIN SODIUM 4000 UNITS: 5000 INJECTION INTRAVENOUS; SUBCUTANEOUS at 06:54

## 2021-04-28 RX ADMIN — ATORVASTATIN CALCIUM 20 MG: 20 TABLET, FILM COATED ORAL at 09:11

## 2021-04-28 RX ADMIN — ALUMINUM HYDROXIDE AND MAGNESIUM HYDROXIDE 40 ML: 200; 200 SUSPENSION ORAL at 09:51

## 2021-04-28 RX ADMIN — POTASSIUM CHLORIDE 10 MEQ: 10 INJECTION, SOLUTION INTRAVENOUS at 05:11

## 2021-04-28 RX ADMIN — MORPHINE SULFATE 4 MG: 2 INJECTION, SOLUTION INTRAMUSCULAR; INTRAVENOUS at 04:29

## 2021-04-28 RX ADMIN — METOPROLOL TARTRATE 50 MG: 50 TABLET, FILM COATED ORAL at 18:40

## 2021-04-28 RX ADMIN — NITROGLYCERIN 1 INCH: 20 OINTMENT TOPICAL at 09:12

## 2021-04-28 RX ADMIN — Medication 10 ML: at 13:33

## 2021-04-28 RX ADMIN — SODIUM CHLORIDE 75 ML/HR: 9 INJECTION, SOLUTION INTRAVENOUS at 20:15

## 2021-04-28 RX ADMIN — SODIUM CHLORIDE 75 ML/HR: 9 INJECTION, SOLUTION INTRAVENOUS at 10:23

## 2021-04-28 RX ADMIN — POTASSIUM CHLORIDE 10 MEQ: 10 INJECTION, SOLUTION INTRAVENOUS at 09:21

## 2021-04-28 RX ADMIN — NITROGLYCERIN 1 INCH: 20 OINTMENT TOPICAL at 18:40

## 2021-04-28 RX ADMIN — ASPIRIN 81 MG: 81 TABLET, COATED ORAL at 09:11

## 2021-04-28 RX ADMIN — AMLODIPINE BESYLATE 5 MG: 5 TABLET ORAL at 09:11

## 2021-04-28 RX ADMIN — Medication 10 ML: at 21:39

## 2021-04-28 RX ADMIN — Medication 10 ML: at 09:13

## 2021-04-28 RX ADMIN — HEPARIN SODIUM AND DEXTROSE 12 UNITS/KG/HR: 10000; 5 INJECTION INTRAVENOUS at 06:55

## 2021-04-28 RX ADMIN — REGADENOSON 0.4 MG: 0.08 INJECTION, SOLUTION INTRAVENOUS at 13:33

## 2021-04-28 NOTE — ED NOTES
TRANSFER - OUT REPORT:    Verbal report given to English(name) on Roberto Carlos Verdugo  being transferred to [facility](unit) for routine progression of care       Report consisted of patients Situation, Background, Assessment and   Recommendations(SBAR). Information from the following report(s) SBAR, ED Summary and MAR was reviewed with the receiving nurse. Lines:   Peripheral IV 04/28/21 Left Antecubital (Active)   Site Assessment Clean, dry, & intact 04/28/21 0800   Phlebitis Assessment 0 04/28/21 0800   Infiltration Assessment 0 04/28/21 0800   Dressing Status Clean, dry, & intact 04/28/21 0800   Dressing Type Tape;Transparent 04/28/21 0800   Hub Color/Line Status Green;Capped 04/28/21 0800   Action Taken Blood drawn 04/28/21 0344       Peripheral IV 04/28/21 Right Antecubital (Active)   Site Assessment Clean, dry, & intact 04/28/21 0800   Phlebitis Assessment 0 04/28/21 0800   Infiltration Assessment 0 04/28/21 0800   Dressing Status Clean, dry, & intact 04/28/21 0800   Dressing Type Tape;Transparent 04/28/21 0800   Hub Color/Line Status Pink; Infusing 04/28/21 0800        Opportunity for questions and clarification was provided.       Patient transported with:   Monitor  Registered Nurse

## 2021-04-28 NOTE — ROUTINE PROCESS
TRANSFER - IN REPORT:    Verbal report received from Madyson Fernandez (name) on Brittani Bun  being received from ER 9(unit) for routine progression of care      Report consisted of patients Situation, Background, Assessment and   Recommendations(SBAR). Information from the following report(s) ED Summary and Recent Results was reviewed with the receiving nurse. Opportunity for questions and clarification was provided. Assessment completed upon patients arrival to unit and care assumed.

## 2021-04-28 NOTE — ED PROVIDER NOTES
EMERGENCY DEPARTMENT HISTORY AND PHYSICAL EXAM          Date: 4/28/2021  Patient Name: Rick Arauz  Attending of Record: Nghia Butler    History of Presenting Illness     Chief Complaint   Patient presents with    Chest Pain     Patient has been having mid sternal chest pain since yesterday, she took 324mg of aspirin prior to EMS arrival. Patient called PCP several times yesterday with no response. Patient also being treated for gout per EMS and is complaining of bilateral foot pain. History Provided By: Patient    HPI: Rick Arauz is a 82 y.o. female, with past medical history of CAD, status post PCI greater than 10 years ago, hypertension, hyperlipidemia, diabetes presenting with acute onset chest pain which began yesterday afternoon. Patient presents with family who states that she is began complaining of substernal chest tightness about noon yesterday, states that it has been present with nausea and radiation to her left upper extremity with numbness and tingling of the extremity. Patient states that she did to reach PCP but will has been unable to. Presents today due to continued chest pain which woke her up from sleep. States that she attempted to take 325 aspirin but was nauseous afterwards. Patient denies any cough, fever, chills. Patient has no shortness of breath associated with it. Patient has no abdominal pain, no changes in bowel or bladder habits. Unrelated patient states that she has been dealing with a gout flare of her bilateral first metacarpal joint. Patient states that she has been attempting to reach PCP for medication but has unable to. No other acute complaints at this time. PCP: Adrien Alamo MD    There are no other complaints, changes, or physical findings at this time.      Current Facility-Administered Medications   Medication Dose Route Frequency Provider Last Rate Last Admin    morphine injection 4 mg  4 mg IntraVENous ONCE Benjie Swartz MD         Current Outpatient Medications   Medication Sig Dispense Refill    diphenhydrAMINE (Benadryl Allergy) 25 mg tablet Take 25 mg by mouth every six (6) hours as needed.  predniSONE (DELTASONE) 20 mg tablet 60mg po daily for 2days, 40mg po daily for 2 days, 20mg po daily for 4days then stop 14 Tab 0    amLODIPine-benazepril (LOTREL) 5-20 mg per capsule TAKE 1 CAPSULE BY MOUTH DAILY 90 Cap 0    atorvastatin (LIPITOR) 20 mg tablet Take 1 Tab by mouth daily. 90 Tab 2    metoprolol tartrate (LOPRESSOR) 50 mg tablet Take 1 Tab by mouth two (2) times a day. 180 Tab 2    chlorthalidone (HYGROTEN) 25 mg tablet TAKE 1 TABLET BY MOUTH DAILY 90 Tab 2    ASPIRIN LOW DOSE PO Take  by mouth daily. Past History     Past Medical History:  Past Medical History:   Diagnosis Date    CAD (coronary artery disease) 9/16/2009    EF=65-70%; Mild-mod MR; Dr Yasmine Marcus    Diabetes [facility]) 9/16/2009    boarder line controlle by diet and exercise.  Hyperlipidemia 9/16/2009    Hypertension 9/16/2009       Past Surgical History:  Past Surgical History:   Procedure Laterality Date    HX ORTHOPAEDIC  10/2013    right ankle    HX ORTHOPAEDIC  2/20/14    INCISION AND DRAINAGE RIGHT ANKLE AND HARDWARE REMOVAL    CA CARDIAC SURG PROCEDURE UNLIST  2008    cardiac stent       Family History:  Family History   Problem Relation Age of Onset    Hypertension Mother     Heart Disease Mother     Hypertension Father     Heart Disease Father        Social History:  Social History     Tobacco Use    Smoking status: Never Smoker    Smokeless tobacco: Never Used   Substance Use Topics    Alcohol use: No    Drug use: No       Allergies: Allergies   Allergen Reactions    Crestor [Rosuvastatin] Unknown (comments)         Review of Systems   Review of Systems   Constitutional: Negative for chills, fatigue and fever. HENT: Negative for congestion, rhinorrhea, sinus pain and sore throat. Eyes: Negative for pain.    Respiratory: Negative for cough, chest tightness and shortness of breath. Cardiovascular: Positive for chest pain. Negative for leg swelling. Gastrointestinal: Positive for nausea. Negative for abdominal pain, constipation, diarrhea and vomiting. Genitourinary: Negative for difficulty urinating and flank pain. Musculoskeletal: Negative for arthralgias, back pain, myalgias and neck pain. Skin: Negative for rash. Neurological: Negative for weakness and headaches. Physical Exam   Physical Exam  Vitals signs reviewed. Constitutional:       General: She is not in acute distress. Appearance: She is not ill-appearing. HENT:      Head: Normocephalic and atraumatic. Mouth/Throat:      Mouth: Mucous membranes are moist.   Eyes:      Conjunctiva/sclera: Conjunctivae normal.      Pupils: Pupils are equal, round, and reactive to light. Neck:      Musculoskeletal: Normal range of motion and neck supple. Cardiovascular:      Rate and Rhythm: Normal rate. Heart sounds: Normal heart sounds. Pulmonary:      Effort: Pulmonary effort is normal.      Breath sounds: Normal breath sounds. No wheezing or rales. Abdominal:      Palpations: Abdomen is soft. Tenderness: There is no abdominal tenderness. There is no guarding or rebound. Musculoskeletal:         General: No deformity. Skin:     General: Skin is warm and dry. Findings: Erythema present. Comments: Lateral erythema and exquisite tenderness of the first metacarpal   Neurological:      General: No focal deficit present. Mental Status: She is alert and oriented to person, place, and time.    Psychiatric:         Mood and Affect: Mood normal.         Behavior: Behavior normal.          Diagnostic Study Results     Labs -     Recent Results (from the past 12 hour(s))   EKG, 12 LEAD, INITIAL    Collection Time: 04/28/21  3:36 AM   Result Value Ref Range    Ventricular Rate 92 BPM    Atrial Rate 92 BPM    P-R Interval 152 ms    QRS Duration 108 ms    Q-T Interval 404 ms    QTC Calculation (Bezet) 499 ms    Calculated P Axis 107 degrees    Calculated R Axis 6 degrees    Calculated T Axis 141 degrees    Diagnosis       Sinus rhythm with occasional premature ventricular complexes  Anteroseptal infarct (cited on or before 28-APR-2021)  T wave abnormality, consider lateral ischemia  When compared with ECG of 18-OCT-2013 19:02,  premature ventricular complexes are now present  QRS duration has increased  Serial changes of evolving Anteroseptal infarct present     CBC WITH AUTOMATED DIFF    Collection Time: 04/28/21  3:48 AM   Result Value Ref Range    WBC 17.5 (H) 3.6 - 11.0 K/uL    RBC 4.02 3.80 - 5.20 M/uL    HGB 12.2 11.5 - 16.0 g/dL    HCT 36.3 35.0 - 47.0 %    MCV 90.3 80.0 - 99.0 FL    MCH 30.3 26.0 - 34.0 PG    MCHC 33.6 30.0 - 36.5 g/dL    RDW 13.4 11.5 - 14.5 %    PLATELET 207 011 - 779 K/uL    MPV 11.2 8.9 - 12.9 FL    NRBC 0.0 0  WBC    ABSOLUTE NRBC 0.00 0.00 - 0.01 K/uL    NEUTROPHILS 75 32 - 75 %    LYMPHOCYTES 14 12 - 49 %    MONOCYTES 8 5 - 13 %    EOSINOPHILS 2 0 - 7 %    BASOPHILS 0 0 - 1 %    IMMATURE GRANULOCYTES 1 (H) 0.0 - 0.5 %    ABS. NEUTROPHILS 13.3 (H) 1.8 - 8.0 K/UL    ABS. LYMPHOCYTES 2.4 0.8 - 3.5 K/UL    ABS. MONOCYTES 1.3 (H) 0.0 - 1.0 K/UL    ABS. EOSINOPHILS 0.3 0.0 - 0.4 K/UL    ABS. BASOPHILS 0.0 0.0 - 0.1 K/UL    ABS. IMM. GRANS. 0.1 (H) 0.00 - 0.04 K/UL    DF AUTOMATED     SAMPLES BEING HELD    Collection Time: 04/28/21  3:48 AM   Result Value Ref Range    SAMPLES BEING HELD SST BL     COMMENT        Add-on orders for these samples will be processed based on acceptable specimen integrity and analyte stability, which may vary by analyte. Radiologic Studies -   XR CHEST PORT   Final Result   No acute process identified. CT Results  (Last 48 hours)    None        CXR Results  (Last 48 hours)    None            Medical Decision Making   I am the first provider for this patient.     I reviewed the vital signs, available nursing notes, past medical history, past surgical history, family history and social history. Vital Signs-Reviewed the patient's vital signs. Patient Vitals for the past 12 hrs:   Temp Pulse Resp BP SpO2   04/28/21 0347     98 %   04/28/21 0334 98.7 °F (37.1 °C) 94 10 (!) 166/60 98 %       ECG Interpretation: NSR with ST changes evolving in septal leads, possible ischemia     Records Reviewed: Nursing Notes and Old Medical Records    Provider Notes (Medical Decision Making):   DDx: Patient is a 61-year-old female with history stated above presenting with acute onset chest pain which began yesterday. Patient states that it is tight in her chest, so she with nausea, she did take 325 of aspirin before arrival.  Patient denies any other infectious symptoms, well-appearing on exam but uncomfortable in pain. Vital signs are within normal limits, she also has gout of bilateral first metatarpal, she is very tender to. Patient states that she had stents greater than 10 years ago, has been taking aspirin as directed. Will plan on troponin, chest x-ray, BNP, BMP, CBC to evaluate for causes of chest pain. Obtain labs and imaging and reassess patient     ED Course and Progress Notes:   Initial assessment performed. The patients presenting problems have been discussed, and they are in agreement with the care plan formulated and outlined with them. I have encouraged them to ask questions as they arise throughout their visit. ED Course as of Apr 28 0543   Wed Apr 28, 2021   0386 With mild LOIS and hypokalemia noted will provide with 500 bolus of fluids and potassium repletion. [RN]   9290 Plan on admission for ACS rule out given age, history and Fabián. Secondary pending pending    [RN]      ED Course User Index  [RN] Melvin Marx MD               Diagnosis     Clinical Impression:   1. Hypokalemia    2. LOIS (acute kidney injury) ([address]    3.  Acute chest pain        Disposition: Admit      Resident Signature:   Signed By: Gregory Montes MD     April 28, 2021      Please note that this dictation was completed with the assistance of \"Dragon\", the computer voice recognition software. Quite often unanticipated grammatical, syntax, homophones, and other interpretive errors are inadvertently transcribed by the computer software. Please disregard these errors and any errors that have escaped final proofreading. Thank you.

## 2021-04-28 NOTE — PROGRESS NOTES
Bedside and Verbal shift change report given to Josh Johnson RN by Roberto Powell. Report included the following information SBAR, ED Summary, MAR, Recent Results and Cardiac Rhythm NSR.     0840 Cardiology consult called. [phone] Per Palmira Amaro NP, patient can eat now and be NPO after midnight for remainder or stress test tomorrow.

## 2021-04-28 NOTE — CONSULTS
[address] [facility]     Date of  Admission: 4/28/2021  3:19 AM     Admission type:Emergency    Consult for: unstable angina  Consult by: ED MD     Subjective:   Please note that this patient is well-known to [facility] and we are continuing his/her outpatient care/initiating his current episode of inpatient care. Last seen by Dr. Ian Mcnamara 2020. Micah Chiu is a 82 y.o. female admitted for Chest pain [R07.9]  Unstable angina ([address] [I20.0]. Admitted with c/o intermittend squeezing substernal CP radiating to her left arm with associated tingling and numbness which started 24 hours ago and was worse at 11PM last evening. Diaphoretic, nausea with vomiting. Was seen by PCP on 4/21/2021 and started on Prednisone 20mg with a taper for gout. Continues with midsternal discomfort 5/10 with little no relief from nitropaste, and some relief from morphine. ECG SR with no acute changes  Troponin neg. PMH:  CAD stent in 2007, +HTN, +DM, +dyslipidemia     Previous treatment/evaluation includes Percutaneous Coronary Intervention, echocardiogram and stress thallium . Cardiac risk factors: dyslipidemia, diabetes mellitus, obesity, sedentary life style, hypertension, stress, post-menopausal.    Patient Active Problem List    Diagnosis Date Noted    Chest pain 04/28/2021    Unstable angina ([address] 04/28/2021    Diabetes mellitus without complication ([address] 21/54/8307    Left carotid bruit 01/16/2015    Gout 06/11/2013    Mitral valve disorders(424.0) 05/08/2012    Mixed hyperlipidemia 03/07/2012    S/P Stent LAD (LISSA) 03/07/2012    Hypertension 09/16/2009    CAD (coronary artery disease) 09/16/2009      Louann Maher MD  Past Medical History:   Diagnosis Date    CAD (coronary artery disease) 9/16/2009    EF=65-70%; Mild-mod MR; Dr Ian Mcnamara    Diabetes [facility]) 9/16/2009    boarder line controlle by diet and exercise.      Hyperlipidemia 9/16/2009    Hypertension 9/16/2009      Social History     Socioeconomic History    Marital status:      Spouse name: Not on file    Number of children: Not on file    Years of education: Not on file    Highest education level: Not on file   Tobacco Use    Smoking status: Never Smoker    Smokeless tobacco: Never Used   Substance and Sexual Activity    Alcohol use: No    Drug use: No    Sexual activity: Yes     Partners: Male     Birth control/protection: None     Allergies   Allergen Reactions    Crestor [Rosuvastatin] Unknown (comments)      Family History   Problem Relation Age of Onset    Hypertension Mother     Heart Disease Mother     Hypertension Father     Heart Disease Father       Current Facility-Administered Medications   Medication Dose Route Frequency    aspirin delayed-release tablet 81 mg  81 mg Oral DAILY    atorvastatin (LIPITOR) tablet 20 mg  20 mg Oral DAILY    [Held by provider] chlorthalidone (HYGROTON) tablet 25 mg  25 mg Oral DAILY    metoprolol tartrate (LOPRESSOR) tablet 50 mg  50 mg Oral BID    sodium chloride (NS) flush 5-40 mL  5-40 mL IntraVENous Q8H    sodium chloride (NS) flush 5-40 mL  5-40 mL IntraVENous PRN    nitroglycerin (NITROBID) 2 % ointment 1 Inch  1 Inch Topical BID    heparin 25,000 units in D5W 250 ml infusion  12-25 Units/kg/hr IntraVENous TITRATE    predniSONE (DELTASONE) tablet 20 mg  20 mg Oral DAILY WITH BREAKFAST    morphine injection 1 mg  1 mg IntraVENous Q4H PRN    amLODIPine (NORVASC) tablet 5 mg  5 mg Oral DAILY    And    [Held by provider] lisinopriL (PRINIVIL, ZESTRIL) tablet 20 mg  20 mg Oral DAILY    heparin (porcine) injection 2,000 Units  2,000 Units IntraVENous PRN    Or    heparin (porcine) injection 4,000 Units  4,000 Units IntraVENous PRN    0.9% sodium chloride infusion  75 mL/hr IntraVENous CONTINUOUS     Current Outpatient Medications   Medication Sig    diphenhydrAMINE (Benadryl Allergy) 25 mg tablet Take 25 mg by mouth every six (6) hours as needed.  predniSONE (DELTASONE) 20 mg tablet 60mg po daily for 2days, 40mg po daily for 2 days, 20mg po daily for 4days then stop    amLODIPine-benazepril (LOTREL) 5-20 mg per capsule TAKE 1 CAPSULE BY MOUTH DAILY    atorvastatin (LIPITOR) 20 mg tablet Take 1 Tab by mouth daily.  metoprolol tartrate (LOPRESSOR) 50 mg tablet Take 1 Tab by mouth two (2) times a day.  chlorthalidone (HYGROTEN) 25 mg tablet TAKE 1 TABLET BY MOUTH DAILY    ASPIRIN LOW DOSE PO Take  by mouth daily.         Review of Symptoms:   11 systems reviewed, negative other than as stated in the HPI        Objective:      Visit Vitals  BP (!) 113/54   Pulse 63   Temp 98.7 °F (37.1 °C)   Resp 19   Wt 163 lb (73.9 kg)   SpO2 94%   BMI 26.31 kg/m²       Physical Assessment:   General Appearance:  elderly obese AAF in no acute distress; alert, cooperative, appears stated age  Eyes: sclera anicteric  Mouth/Throat: moist mucous membranes; oral pharynx clear  Neck: supple; no JVD or bruit  Pulmonary:  clear to auscultation bilaterally; good effort  Cardiovascular: regular rate and rhythm; no murmur, click, rub, or gallop  Abdomen: soft, non-tender, non-distended; bowel sounds normal  Musculoskeletal: no swelling or deformity; moves all extremities; both feet tender to touch, no errythemia or warmth  Extremities: no edema; palpable distal pulses   Skin: warm and dry  Neuro: grossly normal  Psych: normal mood and affect given the setting      Data Review:   Recent Labs     04/28/21  0348   WBC 17.5*   HGB 12.2   HCT 36.3        Recent Labs     04/28/21  0348   *   K 2.9*   CL 94*   CO2 29   *   BUN 31*   CREA 1.25*   CA 9.0   ALB 3.3*   TBILI 0.4   ALT 25       Recent Labs     04/28/21  0759 04/28/21  0721 04/28/21  0348   TROIQ <0.05 <0.05 <0.05       No intake or output data in the 24 hours ending 04/28/21 1143     Cardiographics    Telemetry: SR  ECG: SR with no acute changes    Echocardiogram: pending  2012 EF 47%, mild diastolic dysfunction    CXRAY: no acute process       Assessment:       Principal Problem:    Unstable angina ([facility] (4/28/2021)    Active Problems:    Hypertension (9/16/2009)      CAD (coronary artery disease) (9/16/2009)      Overview: Zenon ventura      Mixed hyperlipidemia (3/7/2012)      S/P Stent LAD (LISSA) (3/7/2012)      Overview: CYPHER LISSA's to LAD 2007- reason for long-term plavix unless signs of       bleeding      Gout (6/11/2013)      Diabetes mellitus without complication ([facility] (4/75/4279)      Chest pain (4/28/2021)         Plan:     [place]/Chest pain:  Negative troponin and ECG. Continues with mid sternal chest discomfort  Patient has DM and was recently started on steroids for gout which may be causing increased GERD   She has received GI cocktail and her pain has resolved. With her significant hx of CAD with previous stent, DM, HTN, age will proceed with nuclear lexiscan stress starting today and will complete tomorrow, to evaluate for ischemia  If the stress portion today is positive, will plan for cardiac cath in AM  Continue on ASA, BB, statin. Supplementing K+ with IV. HTN:  Controlled, continue on BB and Norvasc  ACE and Hygroten     DM:  Uncontrolled, likely r/t use of steriod    Thank you for consulting [facility]    Levar Olivares NP    Patient seen and examined by me with the above nurse practitioner. I personally performed all components of the history, physical, and medical decision making and agree with the assessment and plan with minor modifications as noted. Today the patient presents with chest discomfort concerning for angina, but with normal EKG and markers. Majority of chest discomfort was subsequently relieved by a GI cocktail. General PE  Gen:  NAD  Mental Status - Alert. General Appearance - Not in acute distress.    HEENT:  PERRL, no carotid bruits or JVD  Chest and Lung Exam   Inspection: Accessory muscles - No use of accessory muscles in breathing. Auscultation:   Breath sounds: - Normal.   Cardiovascular   Inspection: Jugular vein - Bilateral - Inspection Normal.   Palpation/Percussion:   Apical Impulse: - Normal.   Auscultation: Rhythm - Regular. Heart Sounds - S1 WNL and S2 WNL. No S3 or S4. Murmurs & Other Heart Sounds: Auscultation of the heart reveals - No Murmurs. Peripheral Vascular   Upper Extremity: Inspection - Bilateral - No Cyanotic nailbeds or Digital clubbing. Lower Extremity:   Palpation: Edema - Bilateral - No edema. Abdomen:   Soft, non-tender, bowel sounds are active. Neuro: A&O times 3, CN and motor grossly WNL    Chest discomfort initially concerning for unstable angina was mostly relieved with GI cocktail. Recommend nuclear stress test.  Continue cardioprotective meds.

## 2021-04-28 NOTE — H&P
Hospitalist Admission Note    NAME: Roberto Carlos Verdugo   :  1935   MRN:  [id number]     Date/Time:  2021 5:49 AM    Patient PCP: Michelle Poole MD  ______________________________________________________________________  Given the patient's current clinical presentation, I have a high level of concern for decompensation if discharged from the emergency department. Complex decision making was performed, which includes reviewing the patient's available past medical records, laboratory results, and x-ray films. My assessment of this patient's clinical condition and my plan of care is as follows. Assessment / Plan:  Unstable angina/coronary artery disease POA  -Patient presents with chest pain radiating to the left arm  -She is a history of coronary artery disease status post stenting 89 years ago  -At the time of evaluation she continues to report chest pain  -We will start on nitroglycerin paste  -Start heparin drip as patient is having recurrent chest pain last 24 hours  -Troponin is negative  -Trend troponin  -Resume beta-blocker, ACE inhibitor in statin  -Resume aspirin  -Cardiology consultation    Hypertension  -Continue metoprolol, lisinopril and amlodipine  -Hold chlorthalidone due to hypokalemia    Acute gout  -Patient was noted to have acute gout flareup in both feet about a week ago  -She is currently on prednisone  -Continue prednisone 20 mg for now which patient is supposed to complete in next 2 days    Hypokalemia  -Likely due to the use of chlorthalidone  -Replaced orally and IV    Steroid-induced leukocytosis  -Patient has WBC count of 17,000, it is likely due to steroids that she is currently on due to acute gout flareup    Code Status: Full code  Surrogate Decision Maker: Spouse Abby Pond  DVT Prophylaxis: Started on heparin drip  GI Prophylaxis: not indicated    Baseline:  Independent, ambulatory      Subjective:   CHIEF COMPLAINT: Chest pain    HISTORY OF PRESENT ILLNESS:     Hipolito English is a 82 y.o.  female with past medical history significant for coronary artery disease, prediabetes, hypertension, dyslipidemia and gout who presented to ED with a complaint of chest pain. Patient reports having multiple episodes of intermittent squeezing substernal chest pain radiating to the left arm and associated with diaphoresis in last 24 hours. She reports she tried to call her PCP office twice but did not receive any response and had another episode last night while she was sleeping woke her from sleep. She called EMS and was brought in the ED. Her EKG did not show any acute ST segment T wave changes. Troponins were negative. Patient reports her chest pain at worst was 9 out of 10 intensity, she received IV morphine in the ED and chest pain improved to 6 out of 10 intensity. We were asked to admit for work up and evaluation of the above problems. Past Medical History:   Diagnosis Date    CAD (coronary artery disease) 9/16/2009    EF=65-70%; Mild-mod MR; Dr Torri Hong    Diabetes [facility]) 9/16/2009    boarder line controlle by diet and exercise.  Hyperlipidemia 9/16/2009    Hypertension 9/16/2009        Past Surgical History:   Procedure Laterality Date    HX ORTHOPAEDIC  10/2013    right ankle    HX ORTHOPAEDIC  2/20/14    INCISION AND DRAINAGE RIGHT ANKLE AND HARDWARE REMOVAL    [place] CARDIAC SURG PROCEDURE UNLIST  2008    cardiac stent       Social History     Tobacco Use    Smoking status: Never Smoker    Smokeless tobacco: Never Used   Substance Use Topics    Alcohol use: No        Family History   Problem Relation Age of Onset    Hypertension Mother     Heart Disease Mother     Hypertension Father     Heart Disease Father    Reviewed  Allergies   Allergen Reactions    Crestor [Rosuvastatin] Unknown (comments)        Prior to Admission medications    Medication Sig Start Date End Date Taking?  Authorizing Provider   diphenhydrAMINE (Benadryl Allergy) 25 mg tablet Take 25 mg by mouth every six (6) hours as needed. Provider, Historical   predniSONE (DELTASONE) 20 mg tablet 60mg po daily for 2days, 40mg po daily for 2 days, 20mg po daily for 4days then stop 4/21/21   Kristan Gitelman, MD   amLODIPine-benazepril (LOTREL) 5-20 mg per capsule TAKE 1 CAPSULE BY MOUTH DAILY 3/23/21   Kristan Gitelman, MD   atorvastatin (LIPITOR) 20 mg tablet Take 1 Tab by mouth daily. 10/30/20   Aditi MURRAY NP   metoprolol tartrate (LOPRESSOR) 50 mg tablet Take 1 Tab by mouth two (2) times a day. 10/30/20   Aditi MURRAY NP   chlorthalidone (HYGROTEN) 25 mg tablet TAKE 1 TABLET BY MOUTH DAILY 9/22/20   Bhavik Ponce MD   ASPIRIN LOW DOSE PO Take  by mouth daily. Provider, Historical       REVIEW OF SYSTEMS:     I am not able to complete the review of systems because:    The patient is intubated and sedated    The patient has altered mental status due to his acute medical problems    The patient has baseline aphasia from prior stroke(s)    The patient has baseline dementia and is not reliable historian    The patient is in acute medical distress and unable to provide information           Total of 12 systems reviewed as follows:       POSITIVE= underlined text  Negative = text not underlined  General:  fever, chills, sweats, generalized weakness, weight loss/gain,      loss of appetite   Eyes:    blurred vision, eye pain, loss of vision, double vision  ENT:    rhinorrhea, pharyngitis   Respiratory:   cough, sputum production, SOB, RICKS, wheezing, pleuritic pain   Cardiology:   chest pain, palpitations, orthopnea, PND, edema, syncope   Gastrointestinal:  abdominal pain , N/V, diarrhea, dysphagia, constipation, bleeding   Genitourinary:  frequency, urgency, dysuria, hematuria, incontinence   Muskuloskeletal :  arthralgia, myalgia, back pain  Hematology:  easy bruising, nose or gum bleeding, lymphadenopathy   Dermatological: rash, ulceration, pruritis, color change / jaundice  Endocrine:   hot flashes or polydipsia   Neurological:  headache, dizziness, confusion, focal weakness, paresthesia,     Speech difficulties, memory loss, gait difficulty  Psychological: Feelings of anxiety, depression, agitation    Objective:   VITALS:    Visit Vitals  BP (!) 144/53   Pulse 89   Temp 98.7 °F (37.1 °C)   Resp 20   Wt 73.9 kg (163 lb)   SpO2 97%   BMI 26.31 kg/m²       PHYSICAL EXAM:    General:    Alert, cooperative, no distress, appears stated age. HEENT: Atraumatic, anicteric sclerae, pink conjunctivae     No oral ulcers, mucosa moist, throat clear, dentition fair  Neck:  Supple, symmetrical,  thyroid: non tender  Lungs:   Clear to auscultation bilaterally. No Wheezing or Rhonchi. No rales. Chest wall:  No tenderness  No Accessory muscle use. Heart:   Regular  rhythm,  No  murmur   No edema  Abdomen:   Soft, non-tender. Not distended. Bowel sounds normal  Extremities: No cyanosis. No clubbing,      Skin turgor normal, Capillary refill normal, Radial dial pulse 2+  Skin:     Not pale. Not Jaundiced  No rashes   Psych:  Good insight. Not depressed. Not anxious or agitated. Neurologic: EOMs intact. No facial asymmetry. No aphasia or slurred speech. Symmetrical strength, Sensation grossly intact.  Alert and oriented X 4.     _______________________________________________________________________  Care Plan discussed with:    Comments   Patient x    Family  x   present at bedside   RN     Care Manager                    Consultant:      _______________________________________________________________________  Expected  Disposition:   Home with Family x   HH/PT/OT/RN    SNF/LTC    CHARLENE    ________________________________________________________________________  TOTAL TIME: 39 Minutes    Critical Care Provided     Minutes non procedure based      Comments    x Reviewed previous records   >50% of visit spent in counseling and coordination of care x Discussion with patient and/or family and questions answered       ________________________________________________________________________  Signed: Patricio Montenegro MD    Procedures: see electronic medical records for all procedures/Xrays and details which were not copied into this note but were reviewed prior to creation of Plan. LAB DATA REVIEWED:    Recent Results (from the past 24 hour(s))   EKG, 12 LEAD, INITIAL    Collection Time: 04/28/21  3:36 AM   Result Value Ref Range    Ventricular Rate 92 BPM    Atrial Rate 92 BPM    P-R Interval 152 ms    QRS Duration 108 ms    Q-T Interval 404 ms    QTC Calculation (Bezet) 499 ms    Calculated P Axis 107 degrees    Calculated R Axis 6 degrees    Calculated T Axis 141 degrees    Diagnosis       Sinus rhythm with occasional premature ventricular complexes  Anteroseptal infarct (cited on or before 28-APR-2021)  T wave abnormality, consider lateral ischemia  When compared with ECG of 18-OCT-2013 19:02,  premature ventricular complexes are now present  QRS duration has increased  Serial changes of evolving Anteroseptal infarct present     CBC WITH AUTOMATED DIFF    Collection Time: 04/28/21  3:48 AM   Result Value Ref Range    WBC 17.5 (H) 3.6 - 11.0 K/uL    RBC 4.02 3.80 - 5.20 M/uL    HGB 12.2 11.5 - 16.0 g/dL    HCT 36.3 35.0 - 47.0 %    MCV 90.3 80.0 - 99.0 FL    MCH 30.3 26.0 - 34.0 PG    MCHC 33.6 30.0 - 36.5 g/dL    RDW 13.4 11.5 - 14.5 %    PLATELET 782 599 - 986 K/uL    MPV 11.2 8.9 - 12.9 FL    NRBC 0.0 0  WBC    ABSOLUTE NRBC 0.00 0.00 - 0.01 K/uL    NEUTROPHILS 75 32 - 75 %    LYMPHOCYTES 14 12 - 49 %    MONOCYTES 8 5 - 13 %    EOSINOPHILS 2 0 - 7 %    BASOPHILS 0 0 - 1 %    IMMATURE GRANULOCYTES 1 (H) 0.0 - 0.5 %    ABS. NEUTROPHILS 13.3 (H) 1.8 - 8.0 K/UL    ABS. LYMPHOCYTES 2.4 0.8 - 3.5 K/UL    ABS. MONOCYTES 1.3 (H) 0.0 - 1.0 K/UL    ABS. EOSINOPHILS 0.3 0.0 - 0.4 K/UL    ABS. BASOPHILS 0.0 0.0 - 0.1 K/UL    ABS. IMM.  GRANS. 0.1 (H) 0.00 - 0.04 K/UL    DF AUTOMATED     METABOLIC PANEL, COMPREHENSIVE    Collection Time: 04/28/21  3:48 AM   Result Value Ref Range    Sodium 131 (L) 136 - 145 mmol/L    Potassium 2.9 (L) 3.5 - 5.1 mmol/L    Chloride 94 (L) 97 - 108 mmol/L    CO2 29 21 - 32 mmol/L    Anion gap 8 5 - 15 mmol/L    Glucose 174 (H) 65 - 100 mg/dL    BUN 31 (H) 6 - 20 MG/DL    Creatinine 1.25 (H) 0.55 - 1.02 MG/DL    BUN/Creatinine ratio 25 (H) 12 - 20      GFR est AA 49 (L) >60 ml/min/1.73m2    GFR est non-AA 41 (L) >60 ml/min/1.73m2    Calcium 9.0 8.5 - 10.1 MG/DL    Bilirubin, total 0.4 0.2 - 1.0 MG/DL    ALT (SGPT) 25 12 - 78 U/L    AST (SGOT) 14 (L) 15 - 37 U/L    Alk. phosphatase 104 45 - 117 U/L    Protein, total 7.0 6.4 - 8.2 g/dL    Albumin 3.3 (L) 3.5 - 5.0 g/dL    Globulin 3.7 2.0 - 4.0 g/dL    A-G Ratio 0.9 (L) 1.1 - 2.2     TROPONIN I    Collection Time: 04/28/21  3:48 AM   Result Value Ref Range    Troponin-I, Qt. <0.05 <0.05 ng/mL   SAMPLES BEING HELD    Collection Time: 04/28/21  3:48 AM   Result Value Ref Range    SAMPLES BEING HELD SST BL     COMMENT        Add-on orders for these samples will be processed based on acceptable specimen integrity and analyte stability, which may vary by analyte.    NT-PRO BNP    Collection Time: 04/28/21  3:48 AM   Result Value Ref Range    NT pro- <450 PG/ML   LIPASE    Collection Time: 04/28/21  3:48 AM   Result Value Ref Range    Lipase 128 73 - 393 U/L

## 2021-04-28 NOTE — ED NOTES
Bedside and Verbal shift change report given to July Bentley (oncoming nurse) by Amy Montana (offgoing nurse). Report included the following information SBAR, ED Summary and MAR.

## 2021-04-28 NOTE — PROGRESS NOTES
NUC MED: Lexiscan Cardiac Stress in progress. The Lexiscan stress will be done today. The stress lab will schedule patient. Medicine has been ordered. Please keep pt NPO.

## 2021-04-28 NOTE — PROGRESS NOTES
Nuc Med Lexiscan stress test completed. Pt will need resting scan tomorrow am. Check with Cardiologist regarding diet and prep for tommorow.

## 2021-04-28 NOTE — PROGRESS NOTES
Patient admitted by my partner early in a.m.     Chest pain suspected unstable angina  Hypertension  History of recent gout  Hypokalemia  Steroid-induced leukocytosis    Please see orders

## 2021-04-29 VITALS
SYSTOLIC BLOOD PRESSURE: 130 MMHG | DIASTOLIC BLOOD PRESSURE: 52 MMHG | OXYGEN SATURATION: 98 % | WEIGHT: 156.53 LBS | TEMPERATURE: 97.7 F | HEART RATE: 61 BPM | BODY MASS INDEX: 25.26 KG/M2 | RESPIRATION RATE: 18 BRPM

## 2021-04-29 LAB
ANION GAP SERPL CALC-SCNC: 5 MMOL/L (ref 5–15)
APTT PPP: 73.6 SEC (ref 22.1–31)
BUN SERPL-MCNC: 19 MG/DL (ref 6–20)
BUN/CREAT SERPL: 23 (ref 12–20)
CALCIUM SERPL-MCNC: 8.6 MG/DL (ref 8.5–10.1)
CHLORIDE SERPL-SCNC: 105 MMOL/L (ref 97–108)
CO2 SERPL-SCNC: 26 MMOL/L (ref 21–32)
CREAT SERPL-MCNC: 0.81 MG/DL (ref 0.55–1.02)
D DIMER PPP FEU-MCNC: 0.45 MG/L FEU (ref 0–0.65)
ERYTHROCYTE [DISTWIDTH] IN BLOOD BY AUTOMATED COUNT: 13.8 % (ref 11.5–14.5)
GLUCOSE SERPL-MCNC: 144 MG/DL (ref 65–100)
HCT VFR BLD AUTO: 34.2 % (ref 35–47)
HGB BLD-MCNC: 11.2 G/DL (ref 11.5–16)
MCH RBC QN AUTO: 30.5 PG (ref 26–34)
MCHC RBC AUTO-ENTMCNC: 32.7 G/DL (ref 30–36.5)
MCV RBC AUTO: 93.2 FL (ref 80–99)
NRBC # BLD: 0 K/UL (ref 0–0.01)
NRBC BLD-RTO: 0 PER 100 WBC
PLATELET # BLD AUTO: 225 K/UL (ref 150–400)
PMV BLD AUTO: 11.8 FL (ref 8.9–12.9)
POTASSIUM SERPL-SCNC: 3.7 MMOL/L (ref 3.5–5.1)
RBC # BLD AUTO: 3.67 M/UL (ref 3.8–5.2)
SODIUM SERPL-SCNC: 136 MMOL/L (ref 136–145)
STRESS BASELINE HR: 68 BPM
STRESS ESTIMATED WORKLOAD: 1 METS
STRESS EXERCISE DUR MIN: NORMAL
STRESS PEAK DIAS BP: 64 MMHG
STRESS PEAK SYS BP: 122 MMHG
STRESS PERCENT HR ACHIEVED: 66 %
STRESS POST PEAK HR: 90 BPM
STRESS RATE PRESSURE PRODUCT: NORMAL BPM*MMHG
STRESS TARGET HR: 136 BPM
THERAPEUTIC RANGE,PTTT: ABNORMAL SECS (ref 58–77)
WBC # BLD AUTO: 14.1 K/UL (ref 3.6–11)

## 2021-04-29 PROCEDURE — 74011250637 HC RX REV CODE- 250/637: Performed by: INTERNAL MEDICINE

## 2021-04-29 PROCEDURE — 97535 SELF CARE MNGMENT TRAINING: CPT | Performed by: OCCUPATIONAL THERAPIST

## 2021-04-29 PROCEDURE — 85730 THROMBOPLASTIN TIME PARTIAL: CPT

## 2021-04-29 PROCEDURE — 80048 BASIC METABOLIC PNL TOTAL CA: CPT

## 2021-04-29 PROCEDURE — 74011250637 HC RX REV CODE- 250/637: Performed by: NURSE PRACTITIONER

## 2021-04-29 PROCEDURE — 97116 GAIT TRAINING THERAPY: CPT

## 2021-04-29 PROCEDURE — 97165 OT EVAL LOW COMPLEX 30 MIN: CPT | Performed by: OCCUPATIONAL THERAPIST

## 2021-04-29 PROCEDURE — 99233 SBSQ HOSP IP/OBS HIGH 50: CPT | Performed by: INTERNAL MEDICINE

## 2021-04-29 PROCEDURE — 85379 FIBRIN DEGRADATION QUANT: CPT

## 2021-04-29 PROCEDURE — 74011636637 HC RX REV CODE- 636/637: Performed by: INTERNAL MEDICINE

## 2021-04-29 PROCEDURE — 36415 COLL VENOUS BLD VENIPUNCTURE: CPT

## 2021-04-29 PROCEDURE — 97161 PT EVAL LOW COMPLEX 20 MIN: CPT

## 2021-04-29 PROCEDURE — 74011250636 HC RX REV CODE- 250/636: Performed by: INTERNAL MEDICINE

## 2021-04-29 PROCEDURE — 85027 COMPLETE CBC AUTOMATED: CPT

## 2021-04-29 RX ORDER — AMLODIPINE BESYLATE 5 MG/1
10 TABLET ORAL DAILY
Qty: 60 TAB | Refills: 0 | Status: SHIPPED | OUTPATIENT
Start: 2021-04-30 | End: 2021-05-27 | Stop reason: SDUPTHER

## 2021-04-29 RX ORDER — SENNOSIDES 8.6 MG/1
1 TABLET ORAL
Qty: 15 TAB | Refills: 0 | Status: SHIPPED | OUTPATIENT
Start: 2021-04-29 | End: 2021-05-14

## 2021-04-29 RX ORDER — PREDNISONE 20 MG/1
TABLET ORAL
Qty: 14 TAB | Refills: 0 | OUTPATIENT
Start: 2021-04-29

## 2021-04-29 RX ORDER — HYDROCODONE BITARTRATE AND ACETAMINOPHEN 5; 325 MG/1; MG/1
1 TABLET ORAL
Qty: 15 TAB | Refills: 0 | Status: SHIPPED | OUTPATIENT
Start: 2021-04-29 | End: 2021-05-02

## 2021-04-29 RX ORDER — HYDROCODONE BITARTRATE AND ACETAMINOPHEN 5; 325 MG/1; MG/1
1 TABLET ORAL
Status: DISCONTINUED | OUTPATIENT
Start: 2021-04-29 | End: 2021-04-29 | Stop reason: HOSPADM

## 2021-04-29 RX ORDER — PANTOPRAZOLE SODIUM 40 MG/1
40 TABLET, DELAYED RELEASE ORAL
Status: DISCONTINUED | OUTPATIENT
Start: 2021-04-29 | End: 2021-04-29 | Stop reason: HOSPADM

## 2021-04-29 RX ADMIN — METOPROLOL TARTRATE 50 MG: 50 TABLET, FILM COATED ORAL at 08:19

## 2021-04-29 RX ADMIN — HYDROCODONE BITARTRATE AND ACETAMINOPHEN 1 TABLET: 5; 325 TABLET ORAL at 09:57

## 2021-04-29 RX ADMIN — PANTOPRAZOLE SODIUM 40 MG: 40 TABLET, DELAYED RELEASE ORAL at 11:15

## 2021-04-29 RX ADMIN — METOPROLOL TARTRATE 50 MG: 50 TABLET, FILM COATED ORAL at 16:50

## 2021-04-29 RX ADMIN — AMLODIPINE BESYLATE 5 MG: 5 TABLET ORAL at 08:19

## 2021-04-29 RX ADMIN — HEPARIN SODIUM AND DEXTROSE 14 UNITS/KG/HR: 10000; 5 INJECTION INTRAVENOUS at 07:09

## 2021-04-29 RX ADMIN — NITROGLYCERIN 1 INCH: 20 OINTMENT TOPICAL at 08:19

## 2021-04-29 RX ADMIN — Medication 10 ML: at 06:33

## 2021-04-29 RX ADMIN — ATORVASTATIN CALCIUM 20 MG: 20 TABLET, FILM COATED ORAL at 09:57

## 2021-04-29 RX ADMIN — ASPIRIN 81 MG: 81 TABLET, COATED ORAL at 09:57

## 2021-04-29 RX ADMIN — PREDNISONE 20 MG: 20 TABLET ORAL at 09:57

## 2021-04-29 NOTE — PROGRESS NOTES
Nutrition Education    · Verbally reviewed information with Patient  · Educated on [address] to assist with management of s/s of gout. Pt and  present and verbalized understanding. Provided handouts and sample meal plan to follow post-discharge. · Written educational materials provided. · Contact name and number provided. · Refer to Patient Education activity for more details.     Electronically signed by Danish Anderson RD on 4/29/2021 at 3:46 PM

## 2021-04-29 NOTE — PROGRESS NOTES
Problem: Falls - Risk of  Goal: *Absence of Falls  Description: Document Edis Bolaños Fall Risk and appropriate interventions in the flowsheet.   Outcome: Progressing Towards Goal  Note: Fall Risk Interventions:  Mobility Interventions: Patient to call before getting OOB         Medication Interventions: Patient to call before getting OOB    Elimination Interventions: Call light in reach

## 2021-04-29 NOTE — PROGRESS NOTES
End of Shift Note    Bedside shift change report given to Ann WHITMORE  (oncoming nurse) by Maite Martinez (offgoing nurse). Report included the following information SBAR and Kardex    Shift worked:  [id number]     Shift summary and any significant changes:    0600 PTT was 73.6 - therapeutic. Verified with Stacia Celeste in Pharmacy to keep rate of 14/u/kg/hr and repeat in 6 hrs. Concerns for physician to address: None   Zone phone for oncoming shift:  [phone]     Activity:  Activity Level: Up with Assistance  Number times ambulated in hallways past shift: 0  Number of times OOB to chair past shift: 0    Cardiac:   Cardiac Monitoring: Yes      Cardiac Rhythm: Normal sinus rhythm    Access:   Current line(s): PIV     Genitourinary:   Urinary status: voiding    Respiratory:   O2 Device: None (Room air)  Chronic home O2 use?: NO  Incentive spirometer at bedside: NO     GI:     Current diet:  DIET NPO Except Meds  Passing flatus: YES  Tolerating current diet: YES       Pain Management:   Patient states pain is manageable on current regimen: YES    Skin:  Jaleel Score: 19  Interventions: increase time out of bed    Patient Safety:  Fall Score:  Total Score: 3  Interventions: gripper socks  High Fall Risk: Yes    Length of Stay:  Expected LOS: 1d 21h  Actual LOS: [address]

## 2021-04-29 NOTE — ROUTINE PROCESS
DISCHARGE SUMMARY FROM [facility] NURSE    The patient is stable for discharge. I have reviewed the discharge instructions with the patient and spouse. The patient and spouse verbalized understanding. All questions were fully answered. The patient and spouse verbalized no complaints. Hard scripts and medication handouts were given and reviewed with the patient and spouse. Appropriate educational materials and medication side effects teaching were provided also provided. Cardiac monitor and IV line(s) were removed. The following personal items collected during admission were returned to the patient/family  Home medications: none   Dental Appliance: Dental Appliances: None  Vision: Visual Aid: At home, Glasses  Hearing Aid:    Jewelry: Jewelry: None  Clothing: Clothing: Footwear, Pants, Shirt  Other Valuables: Other Valuables: None  Valuables sent to safe:      OR _________________________________________________________________________________________    There were no personal belongings, valuables or home medications left at patient's bedside,  or safe. patient understands DC information. Clarified blood pressure medication and the need to take her blood pressure at home. She was given the gout diet information by nutrition.  Patient is ready for DC

## 2021-04-29 NOTE — PROGRESS NOTES
[address], [address] [facility]  [phone]      Cardiology Progress Note      4/29/2021 10:24 AM    Admit Date: 4/28/2021    Admit Diagnosis:   Chest pain [R07.9]  Unstable angina ([address] [I20.0]    Subjective:     Dru Fallon has no further chest discomfort, contributes previous discomfort to eating \"too many greens\"  Nuclear lexiscan stress pending. Start protonix  Only c/o is of min foot pain, likely r/t gout      Visit Vitals  /62 (BP 1 Location: Left upper arm, BP Patient Position: At rest)   Pulse 65   Temp 98 °F (36.7 °C)   Resp 18   Wt 156 lb 8.4 oz (71 kg)   SpO2 98%   BMI 25.26 kg/m²       Current Facility-Administered Medications   Medication Dose Route Frequency    HYDROcodone-acetaminophen (NORCO) 5-325 mg per tablet 1 Tab  1 Tab Oral Q6H PRN    pantoprazole (PROTONIX) tablet 40 mg  40 mg Oral ACB    aspirin delayed-release tablet 81 mg  81 mg Oral DAILY    atorvastatin (LIPITOR) tablet 20 mg  20 mg Oral DAILY    [Held by provider] chlorthalidone (HYGROTON) tablet 25 mg  25 mg Oral DAILY    metoprolol tartrate (LOPRESSOR) tablet 50 mg  50 mg Oral BID    sodium chloride (NS) flush 5-40 mL  5-40 mL IntraVENous Q8H    sodium chloride (NS) flush 5-40 mL  5-40 mL IntraVENous PRN    nitroglycerin (NITROBID) 2 % ointment 1 Inch  1 Inch Topical BID    predniSONE (DELTASONE) tablet 20 mg  20 mg Oral DAILY WITH BREAKFAST    morphine injection 1 mg  1 mg IntraVENous Q4H PRN    amLODIPine (NORVASC) tablet 5 mg  5 mg Oral DAILY    And    [Held by provider] lisinopriL (PRINIVIL, ZESTRIL) tablet 20 mg  20 mg Oral DAILY     Facility-Administered Medications Ordered in Other Encounters   Medication Dose Route Frequency    sodium chloride (NS) flush 10 mL  10 mL IntraVENous PRN       Objective:      Physical Exam:  General Appearance:  older AAF in no acute distress  Chest:   Clear  Cardiovascular:  Regular rate and rhythm, no murmur.    Abdomen:   Soft, non-tender, bowel sounds are active. Extremities: no peripheral edema; min feet   Skin:  Warm and dry. Data Review:   Recent Labs     04/29/21  0600 04/28/21  1511 04/28/21  0348   WBC 14.1* 13.1* 17.5*   HGB 11.2* 12.2 12.2   HCT 34.2* 37.4 36.3    277 258     Recent Labs     04/29/21  0600 04/28/21  0348    131*   K 3.7 2.9*    94*   CO2 26 29   * 174*   BUN 19 31*   CREA 0.81 1.25*   CA 8.6 9.0   ALB  --  3.3*   TBILI  --  0.4   ALT  --  25       Recent Labs     04/28/21  0759 04/28/21  0721 04/28/21  0348   TROIQ <0.05 <0.05 <0.05         Intake/Output Summary (Last 24 hours) at 4/29/2021 1024  Last data filed at 4/29/2021 0956  Gross per 24 hour   Intake    Output 950 ml   Net -950 ml        Telemetry: SR      Assessment:     Principal Problem:    Unstable angina ([facility] (4/28/2021)    Active Problems:    Hypertension (9/16/2009)      CAD (coronary artery disease) (9/16/2009)      Overview: [place]      Mixed hyperlipidemia (3/7/2012)      S/P Stent LAD (LISSA) (3/7/2012)      Overview: CYPHER LISSA's to LAD 2007- reason for long-term plavix unless signs of       bleeding      Gout (6/11/2013)      Diabetes mellitus without complication ([facility] [address] (6/07/0697)      Chest pain (4/28/2021)        Plan:     [place]/Chest pain:  Negative troponin and ECG. Patient has DM and was recently started on steroids for gout which may be causing increased GERD   She has received GI cocktail and her pain has resolved. Nuclear lexiscan study completed awaiting final report  Continue on ASA, BB, statin.     HTN:  Controlled, continue on Norvasc and BB  ACE and Hygroten on hold     DM:  Uncontrolled, likely r/t use of steriod     If nuclear stress negative, patient can be discharged to home. F/u with Dr. Jeffy Vidal 2-3 weeks. Bucky Goldman [facility]  Cardiology    Patient seen and examined by me with the above nurse practitioner.   I personally performed all components of the history, physical, and medical decision making and agree with the assessment and plan with minor modifications as noted. Today the patient has resolved CP and low risk stress test.    General PE  Gen:  NAD  Mental Status - Alert. General Appearance - Not in acute distress. HEENT:  PERRL, no carotid bruits or JVD  Chest and Lung Exam   Inspection: Accessory muscles - No use of accessory muscles in breathing. Auscultation:   Breath sounds: - Normal.   Cardiovascular   Inspection: Jugular vein - Bilateral - Inspection Normal.   Palpation/Percussion:   Apical Impulse: - Normal.   Auscultation: Rhythm - Regular. Heart Sounds - S1 WNL and S2 WNL. No S3 or S4. Murmurs & Other Heart Sounds: Auscultation of the heart reveals - No Murmurs. Peripheral Vascular   Upper Extremity: Inspection - Bilateral - No Cyanotic nailbeds or Digital clubbing. Lower Extremity:   Palpation: Edema - Bilateral - No edema. Abdomen:   Soft, non-tender, bowel sounds are active. Neuro: A&O times 3, CN and motor grossly WNL    Today the patient has resolved CP and low risk stress test.  F/u in office in the near future.

## 2021-04-29 NOTE — PROGRESS NOTES
Pharmacist Discharge Medication Reconciliation    Significant PMH:   Past Medical History:   Diagnosis Date    CAD (coronary artery disease) 9/16/2009    EF=65-70%; Mild-mod MR; Dr Krysta Mcmahon    Diabetes [facility]) 9/16/2009    boarder line controlle by diet and exercise. Hyperlipidemia 9/16/2009    Hypertension 9/16/2009     Chief Complaint for this Admission:   Chief Complaint   Patient presents with    Chest Pain     Patient has been having mid sternal chest pain since yesterday, she took 324mg of aspirin prior to EMS arrival. Patient called PCP several times yesterday with no response. Patient also being treated for gout per EMS and is complaining of bilateral foot pain. Allergies: Crestor [rosuvastatin]    Discharge Medications:   Current Discharge Medication List        START taking these medications    Details   amLODIPine (NORVASC) 5 mg tablet Take 2 Tabs by mouth daily for 30 days. Qty: 60 Tab, Refills: 0      HYDROcodone-acetaminophen (NORCO) 5-325 mg per tablet Take 1 Tab by mouth every six (6) hours as needed for Pain for up to 3 days. Max Daily Amount: 4 Tabs. Qty: 15 Tab, Refills: 0    Associated Diagnoses: Chronic gout without tophus, unspecified cause, unspecified site      senna (Senna) 8.6 mg tablet Take 1 Tab by mouth daily as needed for Constipation for up to 15 days. Qty: 15 Tab, Refills: 0           CONTINUE these medications which have NOT CHANGED    Details   ASPIRIN LOW DOSE PO Take  by mouth daily. diphenhydrAMINE (Benadryl Allergy) 25 mg tablet Take 25 mg by mouth every six (6) hours as needed. predniSONE (DELTASONE) 20 mg tablet 60mg po daily for 2days, 40mg po daily for 2 days, 20mg po daily for 4days then stop  Qty: 14 Tab, Refills: 0      atorvastatin (LIPITOR) 20 mg tablet Take 1 Tab by mouth daily. Qty: 90 Tab, Refills: 2      metoprolol tartrate (LOPRESSOR) 50 mg tablet Take 1 Tab by mouth two (2) times a day.   Qty: 180 Tab, Refills: 2      chlorthalidone (HYGROTEN) 25 mg tablet TAKE 1 TABLET BY MOUTH DAILY  Qty: 90 Tab, Refills: 2           STOP taking these medications       amLODIPine-benazepril (LOTREL) 5-20 mg per capsule Comments:   Reason for Stopping:               The patient's chart, MAR and AVS were reviewed by Jenny Negrete [facility].     Discharging Provider: Graham Rowell MD    Thank Betty Negrete, [facility]

## 2021-04-29 NOTE — DISCHARGE SUMMARY
Hospitalist Discharge Summary     Patient ID:  Ny Lizama  [id number]  44 y.o.  1935 4/28/2021    PCP on record: Fausto Nicole MD    Admit date: 4/28/2021  Discharge date and time: 4/29/2021    DISCHARGE DIAGNOSIS:    Chest pain with normal stress test and D dimer  Hypertension  History of recent gout  Hypokalemia  Steroid-induced leukocytosis     CONSULTATIONS:  IP CONSULT TO CARDIOLOGY    Excerpted HPI from H&P of Nasra Alejandra MD:  Bhakti Lion is a 82 y.o.  female with past medical history significant for coronary artery disease, prediabetes, hypertension, dyslipidemia and gout who presented to ED with a complaint of chest pain. Patient reports having multiple episodes of intermittent squeezing substernal chest pain radiating to the left arm and associated with diaphoresis in last 24 hours. She reports she tried to call her PCP office twice but did not receive any response and had another episode last night while she was sleeping woke her from sleep. She called EMS and was brought in the ED. Her EKG did not show any acute ST segment T wave changes. Troponins were negative. Patient reports her chest pain at worst was 9 out of 10 intensity, she received IV morphine in the ED and chest pain improved to 6 out of 10 intensity.        ______________________________________________________________________  DISCHARGE SUMMARY/HOSPITAL COURSE:  for full details see H&P, daily progress notes, labs, consult notes. Patient was admitted to the hospital and acute coronary syndrome was ruled out with 3 sets of troponins. EKG was normal.  Cardiology consultation was placed for further evaluation. Underwent a stress test which is within normal limits. D-dimer was normal.  Was cleared by cardiology to be discharged for outpatient follow-up. Chest x-ray shows no acute process.   Patient was also noted to have recent gout and was continued on home steroids and due to severe pain was prescribed Norco along with as needed laxatives. She was noted to have a normal baseline creatinine but creatinine increased to 1.25 and her home ACE inhibitor was held. With this her creatinine improved. Her home ACE inhibitor is being held and her home Norvasc is increased from 5 to 10 mg and was advised to follow-up with PCP, check her blood pressure and also have BMP in about 1 week's time. Patient seen and examined today, vital signs stable, lab work is at baseline was cleared by cardiology to be discharged for outpatient follow-up. If she had further chest pain, she was advised to follow-up with a primary care physician and be referred to gastroenterology for further work-up of chest pain due to possible gastrointestinal causes. _______________________________________________________________________  Patient seen and examined by me on discharge day. Pertinent Findings:  Gen:    Not in distress  Chest: Clear lungs  CVS:   Regular rhythm. No edema  Abd:  Soft, not distended, not tender  Neuro:  Alert, awake  _______________________________________________________________________  DISCHARGE MEDICATIONS:   Current Discharge Medication List      START taking these medications    Details   amLODIPine (NORVASC) 5 mg tablet Take 2 Tabs by mouth daily for 30 days. Qty: 60 Tab, Refills: 0      HYDROcodone-acetaminophen (NORCO) 5-325 mg per tablet Take 1 Tab by mouth every six (6) hours as needed for Pain for up to 3 days. Max Daily Amount: 4 Tabs. Qty: 15 Tab, Refills: 0    Associated Diagnoses: Chronic gout without tophus, unspecified cause, unspecified site      senna (Senna) 8.6 mg tablet Take 1 Tab by mouth daily as needed for Constipation for up to 15 days. Qty: 15 Tab, Refills: 0         CONTINUE these medications which have NOT CHANGED    Details   ASPIRIN LOW DOSE PO Take  by mouth daily.       diphenhydrAMINE (Benadryl Allergy) 25 mg tablet Take 25 mg by mouth every six (6) hours as needed. predniSONE (DELTASONE) 20 mg tablet 60mg po daily for 2days, 40mg po daily for 2 days, 20mg po daily for 4days then stop  Qty: 14 Tab, Refills: 0      atorvastatin (LIPITOR) 20 mg tablet Take 1 Tab by mouth daily. Qty: 90 Tab, Refills: 2      metoprolol tartrate (LOPRESSOR) 50 mg tablet Take 1 Tab by mouth two (2) times a day. Qty: 180 Tab, Refills: 2      chlorthalidone (HYGROTEN) 25 mg tablet TAKE 1 TABLET BY MOUTH DAILY  Qty: 90 Tab, Refills: 2         STOP taking these medications       amLODIPine-benazepril (LOTREL) 5-20 mg per capsule Comments:   Reason for Stopping:                 Patient Follow Up Instructions:     1. Recommended diet: Cardiac    2. Recommended activity: Activity as tolerated    3. If you experience any of the following symptoms then please call your primary care physician or return to the emergency room if you cannot get hold of your doctor:    4. Wound Care: None    5.  Lab work: Cbc and Bmp in 1 week       Follow-up Information     Follow up With Specialties Details Why Contact Info    Renée Garcia MD Cardiology, [address] [place] [address] Vascular Surgery, Internal Medicine On 5/14/2021 at 9:00 AM at [address] [place] [address] Drive [address] [place] [address]  [address] [facility] Millicent Marquez MD Internal Medicine   . [address]  [place] [phone]          ________________________________________________________________    Risk of deterioration: High    Condition at Discharge:  Stable  __________________________________________________________________    Disposition  Home with family, no needs    ____________________________________________________________________    Code Status: Full Code  ___________________________________________________________________      Total time in minutes spent coordinating this discharge (includes going over instructions, follow-up, prescriptions, and preparing report for sign off to her PCP) :  35  minutes    Signed:  Marcio Liu MD

## 2021-04-29 NOTE — DISCHARGE INSTRUCTIONS
Patient Discharge Instructions    Linda Adames / [id number] : 1935    Admitted 2021 Discharged: 2021         DISCHARGE DIAGNOSIS:   Chest pain with normal stress test and D dimer  Hypertension  History of recent gout  Hypokalemia  Steroid-induced leukocytosis       Take Home Medications     {Medication reconciliation information is now added to the patient's AVS automatically when it is printed. There is no need to use this SmartLink in discharge instructions. Highlight this text and delete it to clear this message}      General drug facts      If you have a very bad allergy, wear an allergy ID at all times.  It is important that you take the medication exactly as they are prescribed.  Keep your medication in the bottles provided by the pharmacist.  [place] a list of all your drugs (prescription, natural products, vitamins, OTC) with you. Give this list to your doctor.  Do not take other medications without consulting your doctor.   Do not share your drugs with others and do not take anyone else's drugs.  Keep all drugs out of the reach of children and pets.   Most drugs may be thrown away in household trash after mixing with coffee grounds or samia litter and sealing in a plastic bag.   Keep a list Call your doctor for help with any side effects. If in the [place] you may also call the FDA at 7-266-CEP-5356     Talk with the doctor before starting any new drug, including OTC, natural products, or vitamins. What to do at Home    1. Recommended diet: Cardiac    2. Recommended activity: Activity as tolerated    3. If you experience any of the following symptoms then please call your primary care physician or return to the emergency room if you cannot get hold of your doctor:    4. Wound Care: None    5. Lab work: Cbc and Bmp in 1 week     6. Bring these papers with you to your follow up appointments.  The papers will help your doctors be sure to continue the care plan from the hospital.      If you have questions regarding the hospital related prescriptions or hospital related issues please call [facility] at [phone]. You can always direct your questions to your primary care doctor if you are unable to reach your hospital physician; your PCP works as an extension of your hospital doctor just like your hospital doctor is an extension of your PCP for your time at the hospital [facility], [facility])      Follow-up with:   PCP: Rao Pérez MD  Follow-up Information     Follow up With Specialties Details Why Contact Info    Vicente Kwon MD Cardiology, [address] [facility] Vascular Surgery, Internal Medicine On 5/14/2021 at 9:00 AM at [id number] [facility] [address] [facility]      Rao Pérez MD Internal Medicine   . [address]  [address]  [phone]             Please call for your own appointment        Information obtained by :  I understand that if any problems occur once I am at home I am to contact my physician. I understand and acknowledge receipt of the instructions indicated above.                                                                                                                                            Physician's or R.N.'s Signature                                                                  Date/Time                                                                                                                                              Patient or Representative Signature                                                          Date/Time

## 2021-04-29 NOTE — PROGRESS NOTES
Ms. Danial Bailey nuclear lexiscan stress study is low-to mild risk for ischemia, reviewed by Dr. Dru Rothman. OK for patient to be discharged.

## 2021-04-29 NOTE — PROGRESS NOTES
Problem: Mobility Impaired (Adult and Pediatric)  Goal: *Acute Goals and Plan of Care (Insert Text)  Description: FUNCTIONAL STATUS PRIOR TO ADMISSION: Patient was independent and active without use of DME. Denies history of falls. Active at baseline. HOME SUPPORT PRIOR TO ADMISSION: The patient lived with  and multiple family members. Physical Therapy Goals  Initiated 4/29/2021  1. Patient will move from supine to sit and sit to supine , scoot up and down, and roll side to side in bed with independence within 7 day(s). 2.  Patient will transfer from bed to chair and chair to bed with modified independence using the least restrictive device within 7 day(s). 3.  Patient will perform sit to stand with modified independence within 7 day(s). 4.  Patient will ambulate with modified independence for 300 feet with the least restrictive device within 7 day(s). Outcome: Progressing Towards Goal   PHYSICAL THERAPY EVALUATION  Patient: Lexus Mosher (71 y.o. female)  Date: 4/29/2021  Primary Diagnosis: Chest pain [R07.9]  Unstable angina ([address] [I20.0]        Precautions:  Fall    ASSESSMENT  Based on the objective data described below, the patient presents with increased pain levels due to gout in BLEs which results in altered gait pattern, mild impairments in higher level balance, and decreased activity tolerance. Ambulation trial occurred with and without support of RW with pt demonstrating significant improvement in gait stability with use of RW. Pt able to ambulate 150ft w/ RW and SBAx1. Pt limited mostly due to pain levels associated with gout flare and should continue to use RW during all OOB mobility/ambulation as a result. Recommend HHPT for home safety evaluation at discharge. Current Level of Function Impacting Discharge (mobility/balance): SBAx1 with use of rolling walker    Functional Outcome Measure:   The patient scored 65/100 on the Barthel Index outcome measure which is indicative of moderate impairment in ADLs and functional mobility. Other factors to consider for discharge: BLE pain secondary to gout     Patient will benefit from skilled therapy intervention to address the above noted impairments. PLAN :  Recommendations and Planned Interventions: bed mobility training, transfer training, gait training, therapeutic exercises, patient and family training/education, and therapeutic activities      Frequency/Duration: Patient will be followed by physical therapy:  3 times a week to address goals. Recommendation for discharge: (in order for the patient to meet his/her long term goals)  Physical therapy at least 2 days/week in the home     This discharge recommendation:  Has been made in collaboration with the attending provider and/or case management    IF patient discharges home will need the following DME: patient owns DME required for discharge, including RW         SUBJECTIVE:   Patient stated This is all because of my gout, my heart is fine, if my doctor would have returned my call, I wouldn't be in this mess.     OBJECTIVE DATA SUMMARY:   HISTORY:    Past Medical History:   Diagnosis Date    CAD (coronary artery disease) 9/16/2009    EF=65-70%; Mild-mod MR; Dr Salazar Cassette    Diabetes [facility]) 9/16/2009    boarder line controlle by diet and exercise.      Hyperlipidemia 9/16/2009    Hypertension 9/16/2009     Past Surgical History:   Procedure Laterality Date    HX ORTHOPAEDIC  10/2013    right ankle    HX ORTHOPAEDIC  2/20/14    INCISION AND DRAINAGE RIGHT ANKLE AND HARDWARE REMOVAL    IN CARDIAC SURG PROCEDURE UNLIST  2008    cardiac stent       Personal factors and/or comorbidities impacting plan of care:     Home Situation  Home Environment: Private residence  # Steps to Enter: 1  One/Two Story Residence: Two story  Lift Chair Available: Yes  Living Alone: No  Support Systems: Spouse/Significant Other/Partner, Child(ghassan), Family member(s)  Patient Expects to be Discharged to[de-identified] Private residence  Current DME Used/Available at Home: Raised toilet seat, Shower chair, Wheelchair, Walker, rolling  Tub or Shower Type: Shower    EXAMINATION/PRESENTATION/DECISION MAKING:   Critical Behavior:  Neurologic State: Alert  Orientation Level: Oriented X4  Cognition: Appropriate for age attention/concentration, Follows commands  Safety/Judgement: Awareness of environment, Insight into deficits  Hearing: Auditory  Auditory Impairment: None  Skin:  intact  Edema: none noted  Range Of Motion:  AROM: Within functional limits                       Strength:    Strength: Generally decreased, functional                    Tone & Sensation:   Tone: Normal              Sensation: Intact               Coordination:  Coordination: Within functional limits  Vision:   Acuity: Within Defined Limits  Corrective Lenses: Glasses  Functional Mobility:  Bed Mobility:           Scooting: Independent  Transfers:  Sit to Stand: Stand-by assistance  Stand to Sit: Stand-by assistance                       Balance:   Sitting: Intact  Standing: Impaired  Standing - Static: Good  Standing - Dynamic : Fair  Ambulation/Gait Training:  Distance (ft): 150 Feet (ft)  Assistive Device: Walker, rolling;Gait belt  Ambulation - Level of Assistance: Stand-by assistance        Gait Abnormalities: Antalgic        Base of Support: Widened     Speed/Kallie: Pace decreased (<100 feet/min)                     Functional Measure:  Barthel Index:    Bathin  Bladder: 10  Bowels: 10  Groomin  Dressin  Feeding: 10  Mobility: 10  Stairs: 5  Toilet Use: 5  Transfer (Bed to Chair and Back): 10  Total: 65/100       The Barthel ADL Index: Guidelines  1. The index should be used as a record of what a patient does, not as a record of what a patient could do. 2. The main aim is to establish degree of independence from any help, physical or verbal, however minor and for whatever reason.   3. The need for supervision renders the patient not independent. 4. A patient's performance should be established using the best available evidence. Asking the patient, friends/relatives and nurses are the usual sources, but direct observation and common sense are also important. However direct testing is not needed. 5. Usually the patient's performance over the preceding 24-48 hours is important, but occasionally longer periods will be relevant. 6. Middle categories imply that the patient supplies over 50 per cent of the effort. 7. Use of aids to be independent is allowed. Nathalia Viramontes., Barthel, DAriellaW. ([id number]). Functional evaluation: the Barthel Index. [facility] (14)2. TAMMIE Oquendo, Brigid Plush., Marilyn Singer.[facility], [address] [place] [address] Ave (1999). Measuring the change indisability after inpatient rehabilitation; comparison of the responsiveness of the Barthel Index and Functional [place] Measure. Journal of Neurology, Neurosurgery, and Psychiatry, 66(4), 961-449. Brianna Mcpherson, N.J.A, LACI Torres, & Kim Triana MOG. (2004.) Assessment of post-stroke quality of life in cost-effectiveness studies: The usefulness of the Barthel Index and the EuroQoL-5D.  Quality of Life Research, 15, 309-23           Physical Therapy Evaluation Charge Determination   History Examination Presentation Decision-Making   MEDIUM  Complexity : 1-2 comorbidities / personal factors will impact the outcome/ POC  MEDIUM Complexity : 3 Standardized tests and measures addressing body structure, function, activity limitation and / or participation in recreation  MEDIUM Complexity : Evolving with changing characteristics  MEDIUM Complexity : FOTO score of 26-74      Based on the above components, the patient evaluation is determined to be of the following complexity level: MEDIUM    Pain Rating:  Reported pain in BLEs due to gout however did not quantify    Activity Tolerance:   Good    After treatment patient left in no apparent distress:   Sitting in chair, Call bell within reach, and Caregiver / family present    COMMUNICATION/EDUCATION:   The patients plan of care was discussed with: Registered nurse. Fall prevention education was provided and the patient/caregiver indicated understanding., Patient/family have participated as able in goal setting and plan of care. , and Patient/family agree to work toward stated goals and plan of care.     Thank you for this referral.  Osmany Smith, PT, DPT   Time Calculation: 16 mins

## 2021-04-29 NOTE — PROGRESS NOTES
Transition of Care Plan:    RUR: 11%  Disposition: Home with follow-up Appointments  Follow up appointments: PCP, Cardiologist  DME needed: None  Transportation at Discharge: Pt's  will transport at d/c.   Josiah Malone or means to access home:    Pt has h   IM Medicare letter: Not needed  Caregiver Contact: [address]  Discharge Caregiver contacted prior to discharge? Yes    Reason for Admission:   Chest pain; Unstable angina                    RUR Score:    12 Low risk for readmission              PCP: First and Last name:   Ramona Chirinos MD     Name of Practice:    Are you a current patient: Yes/No: Yes   Approximate date of last visit: 5/21/2021   Can you participate in a virtual visit if needed: Yes    Do you (patient/family) have any concerns for transition/discharge? None               Plan for utilizing home health:  Pt will not be utilizing home health at d/c. Current Advanced Directive/Advance Care Plan:  Full Code; Pt does not have an ACP on file. Pt is being d/c and did not have the time to address AMD    Advance Care Planning     General Advance Care Planning (ACP) Conversation      Date of Conversation: 4/29/2021  Conducted with: Patient with Decision Making Capacity    Healthcare Decision Maker: Eder Omalley-     Content/Action Overview:   Has NO ACP documents/care preferences - information provided, considering goals and options  Reviewed DNR/DNI and patient elects Full Code (Attempt Resuscitation)   Length of Voluntary ACP Conversation in minutes:  16 minutes          CM met with pt and pt's  at bedside to discuss d/c plan. Pt was alert and oriented. CM verified pt's demographics, insurance and PCP. Pt is a 81 y/o [place] American female admitted to [facility] on 4/18/2021 for chest pain and unstable angina. Pt's sees PCP Dr. Aidee Aguilera. Pt sees PCP twice a year. Pt's uses [address] for Rx.  Pt resides in a two level home with 0 UMA with . Pt is independent with ADL's and IADL's. Pt does drive. Pt has a wheelchair, walker and stair lift. Pt has had home health in the past. No SNF or IPR in the past. Pt is FULL code status. Pt does not have an ACP on file. Pt's  will transport at d/c.     CM discussed with pt about home health. Pt declined HH. Care Management Interventions  PCP Verified by CM: Yes(Pt's sees PCP Dr. Man Boucher. Pt sees PCP twice a year)  Palliative Care Criteria Met (RRAT>21 & CHF Dx)?: No  Mode of Transport at Discharge: Other (see comment)(Pt's  will transport at d/c. )  Transition of Care Consult (CM Consult): Other(Home with Follow-up Appointments)  Discharge Durable Medical Equipment: No(Pt has a wheelchair, walker and stair lift)  Physical Therapy Consult: Yes  Occupational Therapy Consult: Yes  Speech Therapy Consult: Yes  Current Support Network: Lives with Spouse, Own Home(Pt resides in a two level home with 0 UMA with )  Confirm Follow Up Transport: Self(Pt does drive)  [employer] Provided?: No  Discharge Location  Discharge Placement: Home(Home with Follow-up Appointments)    4:07pm-No further CM needs identified. CM notified pt's nurse of d/c. CM will continue to follow patient for discharge planning needs and arrange for services as deemed necessary.     Darryle Chandler, Luite Tee [address] [place] [address]  [phone]

## 2021-04-29 NOTE — ROUTINE PROCESS
Report received from [facility]. SBAR were discussed.     Heydi Olsen RN

## 2021-04-29 NOTE — TELEPHONE ENCOUNTER
PCP: Aguilar Alegria MD    Last appt: 2021  Future Appointments   Date Time Provider Guido Goldstein   2021  8:15 AM Aguilar Alegria MD [facility] BS AMB   2021  9:00 AM Valarie Claudio MD [facility] BS AMB       Requested Prescriptions     Pending Prescriptions Disp Refills    predniSONE (DELTASONE) 20 mg tablet 14 Tab 0     Simg po daily for 2days, 40mg po daily for 2 days, 20mg po daily for 4days then stop

## 2021-04-29 NOTE — PROGRESS NOTES
Problem: Self Care Deficits Care Plan (Adult)  Goal: *Acute Goals and Plan of Care (Insert Text)  Description:     FUNCTIONAL STATUS PRIOR TO ADMISSION: Patient was independent and active without use of DME.    HOME SUPPORT: The patient lives with her . Occupational Therapy Goals  Initiated 4/29/2021  1. Patient will perform grooming standing at sink with independence within 7 day(s). 2.  Patient will perform upper body dressing with independence within 7 day(s). 3.  Patient will perform lower body dressing with independence within 7 day(s). 4.  Patient will perform toilet transfers with modified independence within 7 day(s). 5.  Patient will perform all aspects of toileting with independence within 7 day(s). 6.  Patient will perform ADL setup with independence within 7 day(s). Outcome: Progressing Towards Goal    OCCUPATIONAL THERAPY EVALUATION  Patient: Alden Peralta (30 y.o. female)  Date: 4/29/2021  Primary Diagnosis: Chest pain [R07.9]  Unstable angina ([address] [I20.0]        Precautions:  Fall    ASSESSMENT  Based on the objective data described below, the patient presents with mild GW, B gout related foot pain and decreased standing balance which is impairing impairing her functional independence. The patient is functioning below her independent baseline, now performing ADLs and functional mobility at an independent to CGA level. The patient will benefit from acute OT intervention and will need HHOT, PT and supervision after discharge. Functional Outcome Measure: The patient scored 65/100 on the Barthel Index outcome measure which is indicative of 35% impairment in function.           PLAN :  Recommendations and Planned Interventions: self care training, functional mobility training, balance training, therapeutic activities, endurance activities, patient education, home safety training, and family training/education    Frequency/Duration: Patient will be followed by occupational therapy 3 times a week to address goals. Recommendation for discharge: (in order for the patient to meet his/her long term goals)  Occupational therapy at least 2 days/week in the home AND ensure assist and/or supervision for safety with ADLs and functional mobility    Equipment recommendations for successful discharge (if) home: none, She has needed DME       OBJECTIVE DATA SUMMARY:   HISTORY:   Past Medical History:   Diagnosis Date    CAD (coronary artery disease) 9/16/2009    EF=65-70%; Mild-mod MR; Dr Jennifer Rodriguez    Diabetes [facility]) 9/16/2009    boarder line controlle by diet and exercise. Hyperlipidemia 9/16/2009    Hypertension 9/16/2009     Past Surgical History:   Procedure Laterality Date    HX ORTHOPAEDIC  10/2013    right ankle    HX ORTHOPAEDIC  2/20/14    INCISION AND DRAINAGE RIGHT ANKLE AND HARDWARE REMOVAL    AR CARDIAC SURG PROCEDURE UNLIST  2008    cardiac stent       Expanded or extensive additional review of patient history:     Home Situation  Home Environment: Private residence  One/Two Story Residence: Two story  Living Alone: No  Support Systems: Child(ghassan), Family member(s)  Patient Expects to be Discharged to[de-identified] Private residence  Current DME Used/Available at Home: Raised toilet seat, Shower chair, Walker, rolling, Wheelchair  Tub or Shower Type: Shower    Hand dominance: Right    EXAMINATION OF PERFORMANCE DEFICITS:  Cognitive/Behavioral Status:  Neurologic State: Alert  Orientation Level: Oriented X4  Cognition: Appropriate for age attention/concentration; Follows commands        Safety/Judgement: Awareness of environment; Insight into deficits      Hearing: Auditory  Auditory Impairment: None    Vision/Perceptual:    Acuity: Within Defined Limits    Corrective Lenses: Glasses    Range of Motion:  AROM: Within functional limits    Strength:  Strength: Generally decreased, functional    Coordination:  Coordination: Within functional limits  Fine Motor Skills-Upper: Left Intact; Right Intact    Gross Motor Skills-Upper: Left Intact; Right Intact    Tone & Sensation:  Tone: Normal  Sensation: Intact       Balance:  Sitting: Intact  Standing: Impaired  Standing - Static: Good  Standing - Dynamic : Fair    Functional Mobility and Transfers for ADLs:  Bed Mobility:  Scooting: Independent    Transfers:  Sit to Stand: Stand-by assistance  Stand to Sit: Stand-by assistance  Bathroom Mobility: Contact guard assistance(ambulating without an AD, HHA)  Toilet Transfer : Stand-by assistance    ADL Assessment:  Feeding: Independent    Oral Facial Hygiene/Grooming: Supervision    Bathing: Contact guard assistance    Upper Body Dressing: Supervision;Setup    Lower Body Dressing: Stand-by assistance    Toileting: Stand by assistance                Functional Measure:  Barthel Index:    Bathin  Bladder: 10  Bowels: 10  Groomin  Dressin  Feeding: 10  Mobility: 10  Stairs: 5  Toilet Use: 5  Transfer (Bed to Chair and Back): 10  Total: 65/100        Percentage of impairment   0%   1-19%   20-39%   40-59%   60-79%   80-99%   100%   Barthel Score 0-100 100 99-80 79-60 59-40 20-39 1-19   0     The Barthel ADL Index: Guidelines  1. The index should be used as a record of what a patient does, not as a record of what a patient could do. 2. The main aim is to establish degree of independence from any help, physical or verbal, however minor and for whatever reason. 3. The need for supervision renders the patient not independent. 4. A patient's performance should be established using the best available evidence. Asking the patient, friends/relatives and nurses are the usual sources, but direct observation and common sense are also important. However direct testing is not needed. 5. Usually the patient's performance over the preceding 24-48 hours is important, but occasionally longer periods will be relevant. 6. Middle categories imply that the patient supplies over 50 per cent of the effort.   7. Use of aids to be independent is allowed. John Amador., Barthel, DAriellaW. ([id number]). Functional evaluation: the Barthel Index. [facility] (14)2. TAMMIE Guerrero, Lisa Dougherty., Arlena Cushing., [place], [address] (1999). Measuring the change indisability after inpatient rehabilitation; comparison of the responsiveness of the Barthel Index and Functional [place] Measure. Journal of Neurology, Neurosurgery, and Psychiatry, 66(4), 767-898. BRIE Painting, LACI Torres, & Sofía Mcbride M.A. (2004.) Assessment of post-stroke quality of life in cost-effectiveness studies: The usefulness of the Barthel Index and the EuroQoL-5D. Quality of Life Research, 13, 427-43        Pain Rating:  Mild foot pain 2/2 gout    Activity Tolerance:   VSS and tolerance was good overall      After treatment patient left in no apparent distress:    Sitting in chair, Call bell within reach, and Caregiver / family present    COMMUNICATION/EDUCATION:   The patients plan of care was discussed with: Physical Therapist and Registered Nurse. Home safety education was provided and the patient/caregiver indicated understanding., Patient/family have participated as able in goal setting and plan of care. , and Patient/family agree to work toward stated goals and plan of care. This patients plan of care is appropriate for delegation to [facility] [place] [facility].     Thank you for this referral.  Júnior Martinez, OTR/L  Time Calculation: 20 mins

## 2021-04-30 ENCOUNTER — PATIENT OUTREACH (OUTPATIENT)
Dept: CASE MANAGEMENT | Age: 86
End: 2021-04-30

## 2021-04-30 NOTE — PROGRESS NOTES
Care Transitions Initial Follow Up Call    Call within 2 business days of discharge: Yes     Patient: Roberto Carlos Verdugo Patient : 1935 MRN: [id number]    Last Discharge  [address]       Complaint Diagnosis Description Type Department Provider    21 Chest Pain Hypokalemia . .. ED to Hosp-Admission (Discharged) (ADMIT) Dwight Coy MD; [address] [facility], April. .. Was this an external facility discharge? No     Challenges to be reviewed by the provider   Additional needs identified to be addressed with provider yes  advance care planning- Patient does not have an ACP on file   Hospitalist suggested CBC and BMP check in 1 week  A1C on 21 was 6.2       Method of communication with provider : none    Discussed COVID-19 related testing which was not done at this time. Test results were not done. Patient informed of results, if available? yes     Advance Care Planning:   Does patient have an Advance Directive: decision makers updated     Inpatient Readmission Risk score: Unplanned Readmit Risk Score: 12    Was this a readmission? no   Patient stated reason for the admission: chest pain    Patients top risk factors for readmission: medical condition-[place]   Interventions to address risk factors: Scheduled appointment with PCP-Dr Chelsea Quinones  at 1:15pm Virtually, Scheduled appointment with Specialist-Dr Cleveland  at 9am and Assessment and support for treatment adherence and medication management-Low sodium, low fat and low NA diet, pedications as prescripted on D/C    Care Transition Nurse (CTN) contacted the patient by telephone to perform post hospital discharge assessment. Verified name and  with patient as identifiers. Provided introduction to self, and explanation of the CTN role. CTN reviewed discharge instructions, medical action plan and red flags with patient who verbalized understanding. Were discharge instructions available to patient? yes.  Reviewed appropriate site of care based on symptoms and resources available to patient including: PCP. Patient given an opportunity to ask questions and does not have any further questions or concerns at this time. The patient agrees to contact the PCP office for questions related to their healthcare. Medication reconciliation was performed with patient, who verbalizes understanding of administration of home medications. Referral to Pharm D needed: no     Home Health/Outpatient orders at discharge: none    Durable Medical Equipment ordered at discharge: None    Covid Risk Education    Patient has received both COVID vaccines already    Patient has following risk factors of: diabetes and HTN, gout. Education provided regarding infection prevention, and signs and symptoms of COVID-19 and when to seek medical attention with patient who verbalized understanding. Discussed exposure protocols and quarantine From [facility]: Are you at higher risk for severe illness?  and given an opportunity for questions and concerns. The patient agrees to contact the COVID-19 hotline [phone] or PCP office for questions related to COVID-19. For more information on steps you can take to protect yourself, see CDC's How to Protect Yourself     Was patient discharged with a pulse oximeter? no     Discussed follow-up appointments. If no appointment was previously scheduled, appointment scheduling offered: yes Is follow up appointment scheduled within 7 days of discharge? no   [facility] follow up appointment(s):   Future Appointments   Date Time Provider Guido Goldstein   5/7/2021  1:15 PM David Guy MD [facility] BS AMB   5/14/2021  9:00 AM MD MELINDA Helton BS AMB   6/1/2021  8:15 AM David Guy MD [facility] BS AMB   8/12/2021  9:00 AM MD MELINDA Helton BS [facility]     Non-[facility] follow up appointment(s): none    Plan for follow-up call in 7-10 days based on severity of symptoms and risk factors.   Plan for next call: self management-As below and follow up appointment-As below  CTN provided contact information for future needs. Goals Addressed                 This Visit's Progress     Attends follow-up appointments as directed. 04/30/21   -Patient has an appt with PCP Dr Roro Tatum on 5/7/21 at 1:15pm Virtually  -Patient has an appt with Cardiology Dr Torri Hong on 5/14/21 at [address] [place] [address].      Monitor status of these appt on next call. Harini Christopher MSN, RN, CCM / Care Transition Nurse / [phone]            Understands red flags post discharge. 04/30/21   -Patient states she is feeling well, just came home from shopping.    -Denies C/P, SOB or fatigue.  -Working hard on changing her diet, eating less and low NA, Fat and low Chol.    -Walks 3 X week for 30 minutes. Monitor C/P, SOB, activity tolerance and diet on next call.     Harini Christopher MSN, RN, CCM / Care Transition Nurse / [phone]

## 2021-05-04 NOTE — PROGRESS NOTES
HISTORY OF PRESENT ILLNESS  Dru Fallon is a 82 y.o. female. HPI    Last here vv 4/21/21 Pt is here to f/u on chronic conditions.      Lov, She c/o gout flare, gave prednisone - still having pain but has mostly improved  On exam: Mild hazier erythema base of R toe  She hasn't been able to walk very well   Uric acid level elevated on last labs   She used to be on allopurinal, Will restart   Will give more prednisone     Ms. Erasmo Claudio is a 82y.o. year old female, she is seen today for Transition of Care services following a hospital discharge for unstable angina on 4/28-4/29/21. Our office Nurse Navigator performed an outreach to Ms. Dorinda Grove on 4/30/21 (within 2 business days of discharge) to complete medication reconciliation and a telephonic assessment of her condition. She ate some foods that led to a feeling of tightness in her chest symptoms have resolved    She was in hospital 4/28/21-4/29/21 for unstable angina  Reviewed discharge note : Patient was admitted to the hospital and acute coronary syndrome was ruled out with 3 sets of troponins. EKG was normal.  Cardiology consultation was placed for further evaluation. Underwent a stress test which is within normal limits. D-dimer was normal.  Was cleared by cardiology to be discharged for outpatient follow-up. Chest x-ray shows no acute process. Patient was also noted to have recent gout and was continued on home steroids and due to severe pain was prescribed Norco along with as needed laxatives. She was noted to have a normal baseline creatinine but creatinine increased to 1.25 and her home ACE inhibitor was held. With this her creatinine improved. Her home ACE inhibitor is being held and her home Norvasc is increased from 5 to 10 mg and was advised to follow-up with PCP, check her blood pressure and also have BMP in about 1 week's time.   Patient seen and examined today, vital signs stable, lab work is at baseline was cleared by cardiology to be discharged for outpatient follow-up. If she had further chest pain, she was advised to follow-up with a primary care physician and be referred to gastroenterology for further work-up of chest pain due to possible gastrointestinal causes.     Reviewed consult Dr. Megan Art (cardio) 4/28/21: [place]/Chest pain:  Negative troponin and ECG. Continues with mid sternal chest discomfort  Patient has DM and was recently started on steroids for gout which may be causing increased GERD   She has received GI cocktail and her pain has resolved. With her significant hx of CAD with previous stent, DM, HTN, age will proceed with nuclear lexiscan stress starting today and will complete tomorrow, to evaluate for ischemia  If the stress portion today is positive, will plan for cardiac cath in AM  Continue on ASA, BB, statin. Supplementing K+ with IV. HTN:  Controlled, continue on BB and Norvasc  ACE and [place]   DM:  Uncontrolled, likely r/t use of steriod  Thank you for consulting [facility]    Reviewed cxr 4/28/21: nl  Reviewed stress test 4/28/21: Slight reversibility in the inferior wall is most likely related to diaphragmatic attenuation greater on stress imaging, however myocardium at ischemic risk cannot be excluded with certainty. Left ventricular ejection fraction is 82 %.     Reviewed labs  Started: norvasc 10 mg   Stop: lotrel  k and na low on entering, fine on discharge     No longer having chest pain    BP is controlled 148/62, repeat improved  BP at home 132/67 131/78  She has been anxious with her  going into the hospital   Continues on chlorthalidone 25mg daily, metoprolol 50mg BID, and no longer on lotrel 5-20mg daily     lotrel was stopped in hospital, and norvasc was started and increased from 5 mg to 10 mg was started in hospital   She has some [facility]      Pt has not been checking her BS recently, No longer needs to     Had leukocytosis from prednisone that needed to be followed up on had hypokalemia in the hospital as well     Wt is 161 lbs - down 6 lbs x lov   She is walking a lot (every other day) and watching her diet     Reviewed labs   Ordered labs      Pt follows with Dr. Ryan Garcia (cardio) for CAD  Last there 7/24/20  Continues off plavix  Just had stress test in the hospital  Continues on ASA 81mg   Has f/u in 5/14/21  No signs sx of cad stable        Pt previously followed with Dr. Sussy Morton (derm) for eczema  Pt will only f/u with this physician prn   No recent flares or itching stable  Eczema has been good, not needing cream recently certain triggers make it worse       Continues on lipitor 20mg daily for cholesterol  Recall could not tolerate crestor     No longer on allopurinol 100mg for gout prevention  She has changed her diet which helped  Pt currently has gout flare -- first flare in 2 years getting better but still has some pain in her right great toe she would like to get back on allopurinol for gout prevention uric acid level is elevated on recent labs     ACP not on file. SDMs are her  Vicky Hall), son and daughter-in-law.   Provided information in resent      PREVENTIVE:    Colonoscopy: declines further  Pap: 9/2010, declines further  Mammogram: declines further  Dexa: declines further  Tdap: 12/2018  Pneumovax: 11/18/2014  Avmpkil86: 4/28/2016  Shingrix: both rounds completed   Flu shot:  12/01/20  Foot exam: 12/01/20  Microalbumin:12/20  A1c: 9/18 6.5, 4/19 6.0 8/19 6.2 2/20 6.2 7/20 6.4 12/20 6.1 4/21 6.2  Eye exam: [facility], 6/6/20   Lipids: 4/21 LDL 40  Covid: both complete (moderna)    Patient Active Problem List    Diagnosis Date Noted    Chest pain 04/28/2021    Unstable angina ([facility] [address] 04/28/2021    Diabetes mellitus without complication ([facility] [address] 41/97/0935    Left carotid bruit 01/16/2015    Gout 06/11/2013    Mitral valve disorders(424.0) 05/08/2012    Mixed hyperlipidemia 03/07/2012    S/P Stent LAD (LISSA) 03/07/2012    Hypertension 09/16/2009    CAD (coronary artery disease) 09/16/2009     Current Outpatient Medications   Medication Sig Dispense Refill    HYDROcodone-acetaminophen (NORCO) 5-325 mg per tablet Take  by mouth.  allopurinoL (ZYLOPRIM) 100 mg tablet Take 1 Tab by mouth daily. 30 Tab 1    predniSONE (DELTASONE) 20 mg tablet 60mg po daily for 2days, 40mg po daily for 2 days, 20mg po daily for 2days then stop 12 Tab 0    amLODIPine (NORVASC) 5 mg tablet Take 2 Tabs by mouth daily for 30 days. 60 Tab 0    senna (Senna) 8.6 mg tablet Take 1 Tab by mouth daily as needed for Constipation for up to 15 days. 15 Tab 0    diphenhydrAMINE (Benadryl Allergy) 25 mg tablet Take 25 mg by mouth every six (6) hours as needed.  atorvastatin (LIPITOR) 20 mg tablet Take 1 Tab by mouth daily. 90 Tab 2    metoprolol tartrate (LOPRESSOR) 50 mg tablet Take 1 Tab by mouth two (2) times a day. 180 Tab 2    chlorthalidone (HYGROTEN) 25 mg tablet TAKE 1 TABLET BY MOUTH DAILY 90 Tab 2    ASPIRIN LOW DOSE PO Take  by mouth daily.        Past Surgical History:   Procedure Laterality Date    HX ORTHOPAEDIC  10/2013    right ankle    HX ORTHOPAEDIC  2/20/14    INCISION AND DRAINAGE RIGHT ANKLE AND HARDWARE REMOVAL    [place] CARDIAC SURG PROCEDURE UNLIST  2008    cardiac stent      Lab Results   Component Value Date/Time    WBC 14.1 (H) 04/29/2021 06:00 AM    HGB 11.2 (L) 04/29/2021 06:00 AM    HCT 34.2 (L) 04/29/2021 06:00 AM    PLATELET 617 02/44/1881 06:00 AM    MCV 93.2 04/29/2021 06:00 AM     Lab Results   Component Value Date/Time    Cholesterol, total 117 04/28/2021 03:48 AM    HDL Cholesterol 57 04/28/2021 03:48 AM    LDL, calculated 40.6 04/28/2021 03:48 AM    Triglyceride 97 04/28/2021 03:48 AM    CHOL/HDL Ratio 2.1 04/28/2021 03:48 AM     Lab Results   Component Value Date/Time    GFR est non-AA >60 04/29/2021 06:00 AM    GFR est AA >60 04/29/2021 06:00 AM    Creatinine 0.81 04/29/2021 06:00 AM    BUN 19 04/29/2021 06:00 AM    Sodium 136 04/29/2021 06:00 AM Potassium 3.7 04/29/2021 06:00 AM    Chloride 105 04/29/2021 06:00 AM    CO2 26 04/29/2021 06:00 AM        Review of Systems   Constitutional: Negative for chills and fever. HENT: Negative for hearing loss and tinnitus. Eyes: Negative for blurred vision and double vision. Respiratory: Negative for shortness of breath and wheezing. Cardiovascular: Negative for chest pain and palpitations. Gastrointestinal: Negative for nausea and vomiting. Genitourinary: Negative for dysuria and frequency. Musculoskeletal: Positive for joint pain. Negative for back pain and falls. Skin: Negative for itching and rash. Neurological: Negative for dizziness, loss of consciousness and headaches. Psychiatric/Behavioral: Negative for depression. The patient is not nervous/anxious. Physical Exam  Constitutional:       General: She is not in acute distress. Appearance: Normal appearance. She is not ill-appearing, toxic-appearing or diaphoretic. HENT:      Head: Normocephalic and atraumatic. Right Ear: External ear normal.      Left Ear: External ear normal.   Eyes:      General:         Right eye: No discharge. Left eye: No discharge. Conjunctiva/sclera: Conjunctivae normal.      Pupils: Pupils are equal, round, and reactive to light. Neck:      Musculoskeletal: Normal range of motion and neck supple. Cardiovascular:      Rate and Rhythm: Normal rate and regular rhythm. Pulses: Normal pulses. Heart sounds: Normal heart sounds. No murmur. No friction rub. No gallop. Pulmonary:      Effort: No respiratory distress. Breath sounds: Normal breath sounds. No wheezing or rales. Chest:      Chest wall: No tenderness. Musculoskeletal: Normal range of motion. Right lower leg: No edema. Left lower leg: No edema. Skin:     General: Skin is warm and dry.       Comments:  Mild hazier erythema base of R toe   Neurological:      Mental Status: She is alert and oriented to person, place, and time. Mental status is at baseline. Coordination: Coordination normal.      Gait: Gait normal.   Psychiatric:         Mood and Affect: Mood normal.         Behavior: Behavior normal.         ASSESSMENT and PLAN    ICD-10-CM ICD-9-CM    1. Essential hypertension     Initially blood pressure is elevated repeat improved continue Norvasc 10 mg daily    Continue chlorthalidone--of note she has tolerated this well for years and it does not seem to have triggered gout flares for her    Continue metoprolol twice daily         M77 828.9 METABOLIC PANEL, COMPREHENSIVE      CBC W/O DIFF   2. Chronic gout without tophus, unspecified cause, unspecified site  M1A. 9XX0 219.80 METABOLIC PANEL, COMPREHENSIVE     Restart allopurinol 100 mg daily we will give her another round of prednisone to ensure she does not have gout flare while starting her allopurinol       CBC W/O DIFF   3. Unstable angina (HCC)  V76.3 390.4 METABOLIC PANEL, COMPREHENSIVE         Resolved stress test negative has follow-up with cardiology pending       CBC W/O DIFF   4. Diabetes mellitus without complication (HCC)  H58.3 610.99 METABOLIC PANEL, COMPREHENSIVE       Diet controlled A1c at goal       CBC W/O DIFF   5. Coronary artery disease due to lipid rich plaque  Y15.76 701.53 METABOLIC PANEL, COMPREHENSIVE       Has cardiology follow-up pending negative stress test       I25.83 414.3 CBC W/O DIFF   6. Mixed hyperlipidemia  K22.5 918.7 METABOLIC PANEL, COMPREHENSIVE       Controlled Lipitor       CBC W/O DIFF   7. Hyponatremia  R86.4 090.6 METABOLIC PANEL, COMPREHENSIVE   Hyponatremic on admission resolved by discharge we will follow metabolic panel       CBC W/O DIFF   8.  Hypokalemia  R81.4 282.3 METABOLIC PANEL, COMPREHENSIVE     Significant hypokalemia at admission resolved by discharge repeat metabolic panel     CBC W/O DIFF   9. Leukocytosis, unspecified type  D54.149 190.30 METABOLIC PANEL, COMPREHENSIVE   Related to prednisone monitor blood counts   CBC W/O DIFF           Scribed by Yosvany Cruz, as dictated by Dr. Lena Hollins. Current diagnosis and concerns discussed with pt at length. Pt understands risks and benefits or current treatment plan and medications, and accepts the treatment and medication with any possible risks. Pt asks appropriate questions, which were answered. Pt was instructed to call with any concerns or problems. I have reviewed the note documented by the scribe. The services provided are my own.   The documentation is accurate

## 2021-05-05 ENCOUNTER — OFFICE VISIT (OUTPATIENT)
Dept: INTERNAL MEDICINE CLINIC | Age: 86
End: 2021-05-05
Payer: MEDICARE

## 2021-05-05 ENCOUNTER — HOSPITAL ENCOUNTER (OUTPATIENT)
Dept: LAB | Age: 86
Discharge: HOME | End: 2021-05-05
Payer: MEDICARE

## 2021-05-05 VITALS
DIASTOLIC BLOOD PRESSURE: 62 MMHG | RESPIRATION RATE: 16 BRPM | TEMPERATURE: 98.2 F | SYSTOLIC BLOOD PRESSURE: 128 MMHG | BODY MASS INDEX: 25.91 KG/M2 | HEIGHT: 66 IN | OXYGEN SATURATION: 100 % | HEART RATE: 72 BPM | WEIGHT: 161.2 LBS

## 2021-05-05 DIAGNOSIS — I25.10 CORONARY ARTERY DISEASE DUE TO LIPID RICH PLAQUE: ICD-10-CM

## 2021-05-05 DIAGNOSIS — E87.1 HYPONATREMIA: ICD-10-CM

## 2021-05-05 DIAGNOSIS — D72.829 LEUKOCYTOSIS, UNSPECIFIED TYPE: ICD-10-CM

## 2021-05-05 DIAGNOSIS — I20.0 UNSTABLE ANGINA (HCC): ICD-10-CM

## 2021-05-05 DIAGNOSIS — I10 ESSENTIAL HYPERTENSION: Primary | ICD-10-CM

## 2021-05-05 DIAGNOSIS — E78.2 MIXED HYPERLIPIDEMIA: ICD-10-CM

## 2021-05-05 DIAGNOSIS — I25.83 CORONARY ARTERY DISEASE DUE TO LIPID RICH PLAQUE: ICD-10-CM

## 2021-05-05 DIAGNOSIS — M1A.9XX0 CHRONIC GOUT WITHOUT TOPHUS, UNSPECIFIED CAUSE, UNSPECIFIED SITE: ICD-10-CM

## 2021-05-05 DIAGNOSIS — E11.9 DIABETES MELLITUS WITHOUT COMPLICATION (HCC): ICD-10-CM

## 2021-05-05 DIAGNOSIS — E87.6 HYPOKALEMIA: ICD-10-CM

## 2021-05-05 PROCEDURE — 80053 COMPREHEN METABOLIC PANEL: CPT

## 2021-05-05 PROCEDURE — 99495 TRANSJ CARE MGMT MOD F2F 14D: CPT | Performed by: INTERNAL MEDICINE

## 2021-05-05 PROCEDURE — G8427 DOCREV CUR MEDS BY ELIG CLIN: HCPCS | Performed by: INTERNAL MEDICINE

## 2021-05-05 PROCEDURE — 36415 COLL VENOUS BLD VENIPUNCTURE: CPT

## 2021-05-05 PROCEDURE — 85027 COMPLETE CBC AUTOMATED: CPT

## 2021-05-05 RX ORDER — ALLOPURINOL 100 MG/1
TABLET ORAL
Qty: 90 TAB | Refills: 0 | Status: SHIPPED | OUTPATIENT
Start: 2021-05-05 | End: 2021-08-02

## 2021-05-05 RX ORDER — PREDNISONE 20 MG/1
TABLET ORAL
Qty: 12 TAB | Refills: 0 | Status: SHIPPED | OUTPATIENT
Start: 2021-05-05 | End: 2021-05-14 | Stop reason: ALTCHOICE

## 2021-05-05 RX ORDER — ALLOPURINOL 100 MG/1
100 TABLET ORAL DAILY
Qty: 30 TAB | Refills: 1 | Status: SHIPPED | OUTPATIENT
Start: 2021-05-05 | End: 2021-05-05

## 2021-05-05 RX ORDER — HYDROCODONE BITARTRATE AND ACETAMINOPHEN 5; 325 MG/1; MG/1
TABLET ORAL
COMMUNITY
End: 2021-08-18

## 2021-05-06 LAB
ALBUMIN SERPL-MCNC: 4.2 G/DL (ref 3.6–4.6)
ALBUMIN/GLOB SERPL: 1.7 {RATIO} (ref 1.2–2.2)
ALP SERPL-CCNC: 107 IU/L (ref 39–117)
ALT SERPL-CCNC: 17 IU/L (ref 0–32)
AST SERPL-CCNC: 24 IU/L (ref 0–40)
BILIRUB SERPL-MCNC: 0.5 MG/DL (ref 0–1.2)
BUN SERPL-MCNC: 14 MG/DL (ref 8–27)
BUN/CREAT SERPL: 17 (ref 12–28)
CALCIUM SERPL-MCNC: 9.4 MG/DL (ref 8.7–10.3)
CHLORIDE SERPL-SCNC: 90 MMOL/L (ref 96–106)
CO2 SERPL-SCNC: 27 MMOL/L (ref 20–29)
CREAT SERPL-MCNC: 0.82 MG/DL (ref 0.57–1)
ERYTHROCYTE [DISTWIDTH] IN BLOOD BY AUTOMATED COUNT: 13.1 % (ref 11.7–15.4)
GLOBULIN SER CALC-MCNC: 2.5 G/DL (ref 1.5–4.5)
GLUCOSE SERPL-MCNC: 129 MG/DL (ref 65–99)
HCT VFR BLD AUTO: 38.1 % (ref 34–46.6)
HGB BLD-MCNC: 13 G/DL (ref 11.1–15.9)
MCH RBC QN AUTO: 30.8 PG (ref 26.6–33)
MCHC RBC AUTO-ENTMCNC: 34.1 G/DL (ref 31.5–35.7)
MCV RBC AUTO: 90 FL (ref 79–97)
PLATELET # BLD AUTO: 297 X10E3/UL (ref 150–450)
POTASSIUM SERPL-SCNC: 3.5 MMOL/L (ref 3.5–5.2)
PROT SERPL-MCNC: 6.7 G/DL (ref 6–8.5)
RBC # BLD AUTO: 4.22 X10E6/UL (ref 3.77–5.28)
SODIUM SERPL-SCNC: 134 MMOL/L (ref 134–144)
WBC # BLD AUTO: 12.5 X10E3/UL (ref 3.4–10.8)

## 2021-05-12 ENCOUNTER — PATIENT OUTREACH (OUTPATIENT)
Dept: CASE MANAGEMENT | Age: 86
End: 2021-05-12

## 2021-05-12 NOTE — PROGRESS NOTES
Goals      Attends follow-up appointments as directed. 04/30/21   -Patient has an appt with PCP Dr Sherley Morrissey on 5/7/21 at 1:15pm Virtually  -Patient has an appt with Cardiology Dr Krysta Mcmahon on 5/14/21 at [address] [place] [address].    Monitor status of these appt on next call. Lencho FUNEZ, RN, CCM / Care Transition Nurse / [phone]     05/12/21   -Patient has been seen seen by Dr Sherley Morrissey on 5/5/21.    -Patient still has appt with Dr Krysta Mcmahon on 5/14/21 scheduled. Monitor Cards appt on next call. Lencho FUNEZ, RN, CCM / Care Transition Nurse / [phone]            Understands red flags post discharge. 04/30/21   -Patient states she is feeling well, just came home from shopping.    -Denies C/P, SOB or fatigue.  -Working hard on changing her diet, eating less and low NA, Fat and low Chol.    -Walks 3 X week for 30 minutes. Monitor C/P, SOB, activity tolerance and diet on next call. Lencho FUNEZ, RN, CCM / Care Transition Nurse / [phone]     05/12/21   Patient denies C/P , SOB or fatigue. Only C/O is (R) foot has 1 toe that is \"sore-not pain\". -Maintaining her low fat, JEMAL diet. Monitor for C/P, SOB and foot pain on next call.     Lencho FUNEZ, RN, CCM / Care Transition Nurse / [phone]

## 2021-05-14 ENCOUNTER — OFFICE VISIT (OUTPATIENT)
Dept: CARDIOLOGY CLINIC | Age: 86
End: 2021-05-14
Payer: MEDICARE

## 2021-05-14 VITALS
WEIGHT: 157.3 LBS | RESPIRATION RATE: 18 BRPM | OXYGEN SATURATION: 98 % | HEIGHT: 66 IN | DIASTOLIC BLOOD PRESSURE: 64 MMHG | HEART RATE: 68 BPM | BODY MASS INDEX: 25.28 KG/M2 | SYSTOLIC BLOOD PRESSURE: 122 MMHG

## 2021-05-14 DIAGNOSIS — Z95.820 S/P ANGIOPLASTY WITH STENT: ICD-10-CM

## 2021-05-14 DIAGNOSIS — I10 ESSENTIAL HYPERTENSION: ICD-10-CM

## 2021-05-14 DIAGNOSIS — E11.9 DIABETES MELLITUS WITHOUT COMPLICATION (HCC): ICD-10-CM

## 2021-05-14 DIAGNOSIS — E78.2 MIXED HYPERLIPIDEMIA: ICD-10-CM

## 2021-05-14 DIAGNOSIS — I25.10 CORONARY ARTERY DISEASE INVOLVING NATIVE CORONARY ARTERY OF NATIVE HEART WITHOUT ANGINA PECTORIS: Primary | ICD-10-CM

## 2021-05-14 PROCEDURE — 93005 ELECTROCARDIOGRAM TRACING: CPT | Performed by: INTERNAL MEDICINE

## 2021-05-14 PROCEDURE — G8752 SYS BP LESS 140: HCPCS | Performed by: INTERNAL MEDICINE

## 2021-05-14 PROCEDURE — 1090F PRES/ABSN URINE INCON ASSESS: CPT | Performed by: INTERNAL MEDICINE

## 2021-05-14 PROCEDURE — G8419 CALC BMI OUT NRM PARAM NOF/U: HCPCS | Performed by: INTERNAL MEDICINE

## 2021-05-14 PROCEDURE — G8536 NO DOC ELDER MAL SCRN: HCPCS | Performed by: INTERNAL MEDICINE

## 2021-05-14 PROCEDURE — G0463 HOSPITAL OUTPT CLINIC VISIT: HCPCS | Performed by: INTERNAL MEDICINE

## 2021-05-14 PROCEDURE — 99214 OFFICE O/P EST MOD 30 MIN: CPT | Performed by: INTERNAL MEDICINE

## 2021-05-14 PROCEDURE — G8400 PT W/DXA NO RESULTS DOC: HCPCS | Performed by: INTERNAL MEDICINE

## 2021-05-14 PROCEDURE — G8510 SCR DEP NEG, NO PLAN REQD: HCPCS | Performed by: INTERNAL MEDICINE

## 2021-05-14 PROCEDURE — G8754 DIAS BP LESS 90: HCPCS | Performed by: INTERNAL MEDICINE

## 2021-05-14 PROCEDURE — 1111F DSCHRG MED/CURRENT MED MERGE: CPT | Performed by: INTERNAL MEDICINE

## 2021-05-14 PROCEDURE — G8427 DOCREV CUR MEDS BY ELIG CLIN: HCPCS | Performed by: INTERNAL MEDICINE

## 2021-05-14 PROCEDURE — 1101F PT FALLS ASSESS-DOCD LE1/YR: CPT | Performed by: INTERNAL MEDICINE

## 2021-05-14 PROCEDURE — 93010 ELECTROCARDIOGRAM REPORT: CPT | Performed by: INTERNAL MEDICINE

## 2021-05-14 RX ORDER — LORATADINE 10 MG/1
10 TABLET ORAL DAILY
COMMUNITY

## 2021-05-14 NOTE — PROGRESS NOTES
[address], [place], [address] [facility]  [phone]     Subjective:      Stan Coelho is a 82 y.o. female is here for a hospital f/u. Admitted 4/28/21 with [place]. Recently started on steroid for gout flare up. EKG without acute changes. Troponin neg. GI cocktail and Morphine improved symptoms. She had an initial Creatinine 1.25. Lexiscan stress test 4/28 neg for ischemia. D/C to home holding ARB. Since d/c she denies further chest pain. Denies shortness of breath, orthopnea, PND, LE edema, palpitations, syncope, or presyncope. Managing her diet, avoiding eating later. Patient Active Problem List    Diagnosis Date Noted    Chest pain 04/28/2021    Unstable angina ([facility] 04/28/2021    Diabetes mellitus without complication ([facility] 75/75/2983    Left carotid bruit 01/16/2015    Gout 06/11/2013    Mitral valve disorders(424.0) 05/08/2012    Mixed hyperlipidemia 03/07/2012    S/P Stent LAD (LISSA) 03/07/2012    Hypertension 09/16/2009    CAD (coronary artery disease) 09/16/2009      Kristan Gitelman, MD  Past Medical History:   Diagnosis Date    CAD (coronary artery disease) 9/16/2009    EF=65-70%; Mild-mod MR; Dr Landon Reeves    Diabetes [facility]) 9/16/2009    boarder line controlle by diet and exercise.      Hyperlipidemia 9/16/2009    Hypertension 9/16/2009    Valvular heart disease       Past Surgical History:   Procedure Laterality Date    HX CORONARY STENT PLACEMENT      HX HEART CATHETERIZATION      HX ORTHOPAEDIC  10/2013    right ankle    HX ORTHOPAEDIC  2/20/14    INCISION AND DRAINAGE RIGHT ANKLE AND HARDWARE REMOVAL    [place] CARDIAC SURG PROCEDURE UNLIST  2008    cardiac stent     Allergies   Allergen Reactions    Crestor [Rosuvastatin] Unknown (comments)      Family History   Problem Relation Age of Onset    Hypertension Mother     Heart Disease Mother     Hypertension Father     Heart Disease Father       Social History     Socioeconomic History    Marital status:      Spouse name: Not on file    Number of children: Not on file    Years of education: Not on file    Highest education level: Not on file   Occupational History    Not on file   Social Needs    Financial resource strain: Not on file    Food insecurity     Worry: Not on file     Inability: Not on file    Transportation needs     Medical: Not on file     Non-medical: Not on file   Tobacco Use    Smoking status: Never Smoker    Smokeless tobacco: Never Used   Substance and Sexual Activity    Alcohol use: No    Drug use: No    Sexual activity: Yes     Partners: Male     Birth control/protection: None   Lifestyle    Physical activity     Days per week: Not on file     Minutes per session: Not on file    Stress: Not on file   Relationships    Social connections     Talks on phone: Not on file     Gets together: Not on file     Attends Anabaptist service: Not on file     Active member of club or organization: Not on file     Attends meetings of clubs or organizations: Not on file     Relationship status: Not on file    Intimate partner violence     Fear of current or ex partner: Not on file     Emotionally abused: Not on file     Physically abused: Not on file     Forced sexual activity: Not on file   Other Topics Concern    Not on file   Social History Narrative    Not on file      Current Outpatient Medications   Medication Sig    loratadine (Claritin) 10 mg tablet Take 10 mg by mouth daily.  HYDROcodone-acetaminophen (NORCO) 5-325 mg per tablet Take  by mouth.  allopurinoL (ZYLOPRIM) 100 mg tablet TAKE 1 TABLET BY MOUTH DAILY    amLODIPine (NORVASC) 5 mg tablet Take 2 Tabs by mouth daily for 30 days.  senna (Senna) 8.6 mg tablet Take 1 Tab by mouth daily as needed for Constipation for up to 15 days.  diphenhydrAMINE (Benadryl Allergy) 25 mg tablet Take 25 mg by mouth every six (6) hours as needed.  atorvastatin (LIPITOR) 20 mg tablet Take 1 Tab by mouth daily.     metoprolol tartrate (LOPRESSOR) 50 mg tablet Take 1 Tab by mouth two (2) times a day.  chlorthalidone (HYGROTEN) 25 mg tablet TAKE 1 TABLET BY MOUTH DAILY    ASPIRIN LOW DOSE PO Take  by mouth daily. No current facility-administered medications for this visit. Review of Symptoms:  11 systems reviewed, negative other than as stated in the HPI    Physical ExamPhysical Exam:    Vitals:    05/14/21 0856   BP: 122/64   Resp: 18   SpO2: 98%   Weight: 157 lb 4.8 oz (71.4 kg)   Height: 5' 6\" (1.676 m)     Body mass index is 25.39 kg/m². General PE  Gen:  NAD  Mental Status - Alert. General Appearance - Not in acute distress. HEENT:  PERRL, no carotid bruits or JVD  Chest and Lung Exam   Inspection: Accessory muscles - No use of accessory muscles in breathing. Auscultation:   Breath sounds: - Normal.   Cardiovascular   Inspection: Jugular vein - Bilateral - Inspection Normal.   Palpation/Percussion:   Apical Impulse: - Normal.   Auscultation: Rhythm - Regular. Heart Sounds - S1 WNL and S2 WNL. No S3 or S4. Murmurs & Other Heart Sounds: Auscultation of the heart reveals - +COURTNEY. Peripheral Vascular   Upper Extremity: Inspection - Bilateral - No Cyanotic nailbeds or Digital clubbing. Lower Extremity:   Palpation: Edema - Bilateral - No edema. Abdomen:   Soft, non-tender, bowel sounds are active.   Neuro: A&O times 3, CN and motor grossly WNL    Labs:   Lab Results   Component Value Date/Time    Cholesterol, total 117 04/28/2021 03:48 AM    Cholesterol, total 136 04/21/2021 10:01 AM    Cholesterol, total 158 07/07/2020 09:55 AM    Cholesterol, total 162 02/13/2020 09:53 AM    Cholesterol, total 141 04/23/2019 08:17 AM    HDL Cholesterol 57 04/28/2021 03:48 AM    HDL Cholesterol 66 04/21/2021 10:01 AM    HDL Cholesterol 61 07/07/2020 09:55 AM    HDL Cholesterol 59 02/13/2020 09:53 AM    HDL Cholesterol 56 04/23/2019 08:17 AM    LDL, calculated 40.6 04/28/2021 03:48 AM    LDL, calculated 54.4 04/21/2021 10:01 AM    LDL, calculated 80 07/07/2020 09:55 AM    LDL, calculated 81 02/13/2020 09:53 AM    LDL, calculated 68 04/23/2019 08:17 AM    Triglyceride 97 04/28/2021 03:48 AM    Triglyceride 78 04/21/2021 10:01 AM    Triglyceride 84 07/07/2020 09:55 AM    Triglyceride 108 02/13/2020 09:53 AM    Triglyceride 84 04/23/2019 08:17 AM    CHOL/HDL Ratio 2.1 04/28/2021 03:48 AM    CHOL/HDL Ratio 2.1 04/21/2021 10:01 AM    CHOL/HDL Ratio 2.6 09/15/2010 07:53 AM    CHOL/HDL Ratio 2.8 02/16/2010 08:30 AM    CHOL/HDL Ratio 3.2 09/09/2009 08:12 AM     Lab Results   Component Value Date/Time    CK 52 02/23/2014 03:35 AM     Lab Results   Component Value Date/Time    Sodium 134 05/05/2021 01:59 PM    Potassium 3.5 05/05/2021 01:59 PM    Chloride 90 (L) 05/05/2021 01:59 PM    CO2 27 05/05/2021 01:59 PM    Anion gap 5 04/29/2021 06:00 AM    Glucose 129 (H) 05/05/2021 01:59 PM    BUN 14 05/05/2021 01:59 PM    Creatinine 0.82 05/05/2021 01:59 PM    BUN/Creatinine ratio 17 05/05/2021 01:59 PM    GFR est AA 76 05/05/2021 01:59 PM    GFR est non-AA 66 05/05/2021 01:59 PM    Calcium 9.4 05/05/2021 01:59 PM    Bilirubin, total 0.5 05/05/2021 01:59 PM    Alk. phosphatase 107 05/05/2021 01:59 PM    Protein, total 6.7 05/05/2021 01:59 PM    Albumin 4.2 05/05/2021 01:59 PM    Globulin 3.7 04/28/2021 03:48 AM    A-G Ratio 1.7 05/05/2021 01:59 PM    ALT (SGPT) 17 05/05/2021 01:59 PM       EKG:  SR, nonspecific T wave abnormality     Assessment:     Assessment:      1. ASHD (arteriosclerotic heart disease)    2. Essential hypertension    3. Mixed hyperlipidemia    4. Diabetes mellitus without complication ([address]    5. S/P Stent LAD (LISSA)        Orders Placed This Encounter    AMB POC EKG ROUTINE W/ 12 LEADS, INTER & REP     Order Specific Question:   Reason for Exam:     Answer:   routine    loratadine (Claritin) 10 mg tablet     Sig: Take 10 mg by mouth daily. Plan:     CAD:  Admitted 4/28/21 with [place]  Negative troponin and ECG.    Patient has DM and was recently started on steroids for gout which may be have increased GERD, GI cocktail improved symptoms. Nuclear lexiscan study neg for ischemia. No further symptoms. EKG SR without acute s/t wave changes. Continue on ASA, BB, statin. Had previously been on Plavix due to history of Cypher stent, but discontinued it due to fall with a small amount of intracranial hemorrhage December 2018. Normal EF mild-mod MR per echo in 2012    HTN  Initial Creatinine 1.25, ARB held. continue on increased Norvasc, Chlorthalidone, and BB  Creatinine 5/5/21 0.82    HLD  7/2020 LDL at 80 On statin- due 4 labs in [place] will send lab slip   LDL at 68 in April 2019.         Hx Mild hypokalemia:  K 3.5 5/5/21. Being followed as she is on Chlorthalidone    History of ground-level fall 12/18 with a small amount of intracranial hemorrhage:  Off Plavix, continuing aspirin 81 without sequelae. Continue current care and f/u 6 months      Nacho Hardy NP    Patient seen and examined by me with the above nurse practitioner. I personally performed all components of the history, physical, and medical decision making and agree with the assessment and plan with minor modifications as noted. Today the patient presents with resolved GERD, negative stress and CP relieved by GI cocktail. General PE  Gen:  NAD  Mental Status - Alert. General Appearance - Not in acute distress. HEENT:  PERRL, no carotid bruits or JVD  Chest and Lung Exam   Inspection: Accessory muscles - No use of accessory muscles in breathing. Auscultation:   Breath sounds: - Normal.   Cardiovascular   Inspection: Jugular vein - Bilateral - Inspection Normal.   Palpation/Percussion:   Apical Impulse: - Normal.   Auscultation: Rhythm - Regular. Heart Sounds - S1 WNL and S2 WNL. No S3 or S4. Murmurs & Other Heart Sounds: Auscultation of the heart reveals - No Murmurs.    Peripheral Vascular   Upper Extremity: Inspection - Bilateral - No Cyanotic nailbeds or Digital clubbing. Lower Extremity:   Palpation: Edema - Bilateral - No edema. Abdomen:   Soft, non-tender, bowel sounds are active. Neuro: A&O times 3, CN and motor grossly WNL    Continue current care of stable CAD, hypertension, and hyperlipidemia. Follow-up in 6 months.

## 2021-05-14 NOTE — PROGRESS NOTES
1. Have you been to the ER, urgent care clinic since your last visit? Hospitalized since your last visit? [facility], 4/28/2021, Chest pain    2. Have you seen or consulted any other health care providers outside of the [address] [place] [address] since your last visit? Include any pap smears or colon screening.  No     Chief Complaint   Patient presents with    Coronary [address] f/u; no chest pain since d/c from [facility], denied other cardiac symptoms

## 2021-05-14 NOTE — LETTER
5/14/2021 Patient: Tess Middleton YOB: 1935 Date of Visit: 5/14/2021 Zaki Allen MD 
[address] [address] [address] [employer] Dear Zaki Allen MD, Thank you for referring Ms. Ree Kiser to [address] [place] [address] for evaluation. My notes for this consultation are attached. If you have questions, please do not hesitate to call me. I look forward to following your patient along with you.  
 
 
Sincerely, 
 
Lexie Puente MD

## 2021-05-26 LAB
ALBUMIN SERPL-MCNC: 3.9 G/DL (ref 3.6–4.6)
ALBUMIN/GLOB SERPL: 2 {RATIO} (ref 1.2–2.2)
ALP SERPL-CCNC: 99 IU/L (ref 48–121)
ALT SERPL-CCNC: 20 IU/L (ref 0–32)
AST SERPL-CCNC: 25 IU/L (ref 0–40)
BILIRUB SERPL-MCNC: 0.3 MG/DL (ref 0–1.2)
BUN SERPL-MCNC: 14 MG/DL (ref 8–27)
BUN/CREAT SERPL: 16 (ref 12–28)
CALCIUM SERPL-MCNC: 9.1 MG/DL (ref 8.7–10.3)
CHLORIDE SERPL-SCNC: 92 MMOL/L (ref 96–106)
CHOLEST SERPL-MCNC: 130 MG/DL (ref 100–199)
CO2 SERPL-SCNC: 28 MMOL/L (ref 20–29)
CREAT SERPL-MCNC: 0.85 MG/DL (ref 0.57–1)
GLOBULIN SER CALC-MCNC: 2 G/DL (ref 1.5–4.5)
GLUCOSE SERPL-MCNC: 282 MG/DL (ref 65–99)
HDLC SERPL-MCNC: 50 MG/DL
IMP & REVIEW OF LAB RESULTS: NORMAL
LDLC SERPL CALC-MCNC: 62 MG/DL (ref 0–99)
POTASSIUM SERPL-SCNC: 3.2 MMOL/L (ref 3.5–5.2)
PROT SERPL-MCNC: 5.9 G/DL (ref 6–8.5)
SODIUM SERPL-SCNC: 139 MMOL/L (ref 134–144)
TRIGL SERPL-MCNC: 96 MG/DL (ref 0–149)
VLDLC SERPL CALC-MCNC: 18 MG/DL (ref 5–40)

## 2021-05-26 NOTE — PROGRESS NOTES
Lipids at goal with statin. K is low at 3.2, likely d/t chlorthalidone. Start K chloride 20 mEq BID x 3 days then ok to do daily. Fasting blood glucose is high, discuss with pcp.

## 2021-05-27 ENCOUNTER — TELEPHONE (OUTPATIENT)
Dept: CARDIOLOGY CLINIC | Age: 86
End: 2021-05-27

## 2021-05-27 RX ORDER — POTASSIUM CHLORIDE 20 MEQ/1
20 TABLET, EXTENDED RELEASE ORAL 2 TIMES DAILY
COMMUNITY
End: 2021-05-27 | Stop reason: SDUPTHER

## 2021-05-27 RX ORDER — AMLODIPINE BESYLATE 5 MG/1
10 TABLET ORAL DAILY
Qty: 180 TABLET | Refills: 1 | Status: SHIPPED | OUTPATIENT
Start: 2021-05-27 | End: 2021-06-26

## 2021-05-27 RX ORDER — POTASSIUM CHLORIDE 20 MEQ/1
20 TABLET, EXTENDED RELEASE ORAL 2 TIMES DAILY
Qty: 180 TABLET | Refills: 1 | Status: SHIPPED | OUTPATIENT
Start: 2021-05-27 | End: 2022-02-17 | Stop reason: SDUPTHER

## 2021-05-27 NOTE — TELEPHONE ENCOUNTER
Patient informed she has an appt with Dr Sdaia Mark in August her las glucose (129) was not to elevated.

## 2021-05-27 NOTE — TELEPHONE ENCOUNTER
----- Message from Hanna Savage NP sent at 5/26/2021 11:55 AM EDT -----  Lipids at goal with statin. K is low at 3.2, likely d/t chlorthalidone. Start K chloride 20 mEq BID x 3 days then ok to do daily. Fasting blood glucose is high, discuss with pcp.

## 2021-06-02 ENCOUNTER — PATIENT OUTREACH (OUTPATIENT)
Dept: CASE MANAGEMENT | Age: 86
End: 2021-06-02

## 2021-06-02 NOTE — PROGRESS NOTES
Patient has graduated from the Transitions of Care Coordination  program on 6/2/21. Patient/family has the ability to self-manage at this time Care management goals have been completed. Patient was not referred to the [facility] team for further management. Goals Addressed                 This Visit's Progress     COMPLETED: Attends follow-up appointments as directed. 04/30/21   -Patient has an appt with PCP Dr Luis M Root on 5/7/21 at 1:15pm Virtually  -Patient has an appt with Cardiology Dr Kimberli Zelaya on 5/14/21 at [address] [place] [address].    Monitor status of these appt on next call. Janette FUNEZ, RN, [facility] / Care Transition Nurse / [phone]     05/12/21   -Patient has been seen seen by Dr Luis M Root on 5/5/21.    -Patient still has appt with Dr Kimberli Zelaya on 5/14/21 scheduled. Monitor Cards appt on next call. Janette FUNEZ, RN, [facility] / Care Transition Nurse / [phone]     06/02/21   Patient did attend her Cardiology appt as scheduled. Janette FUNEZ, RN, [facility] / Care Transition Nurse / [phone]                COMPLETED: Understands red flags post discharge. 04/30/21   -Patient states she is feeling well, just came home from shopping.    -Denies C/P, SOB or fatigue.  -Working hard on changing her diet, eating less and low NA, Fat and low Chol.    -Walks 3 X week for 30 minutes. Monitor C/P, SOB, activity tolerance and diet on next call. Janette FUNEZ, RN, [facility] / Care Transition Nurse / [phone]     05/12/21   Patient denies C/P , SOB or fatigue. Only C/O is (R) foot has 1 toe that is \"sore-not pain\". -Maintaining her low fat, JEMAL diet. Monitor for C/P, SOB and foot pain on next call. Janette FUNEZ, RN, [facility] / Care Transition Nurse / [phone]     06/02/21   Patient denies any further C/P or SOB, she is on new medication for her toe and it feels better. Patient has gone from \"175 lb to 156 lb\" and feels great.     Janette Drought MSN, RN, CCM / Care Transition Nurse / [phone]             Patient has Care Transition Nurse's contact information for any further questions, concerns, or needs.   Patients upcoming visits:    Future Appointments   Date Time Provider Guido Goldstein   8/18/2021 11:45 AM Tory Marquez MD [facility] BS AMB   8/30/2021  9:00 AM Renée Garcia MD [facility] BS AMB   11/18/2021  9:00 AM Chelsy Carbajal MD [facility] BS AMB

## 2021-07-23 ENCOUNTER — TELEPHONE (OUTPATIENT)
Dept: CARDIOLOGY CLINIC | Age: 86
End: 2021-07-23

## 2021-07-23 DIAGNOSIS — I10 ESSENTIAL HYPERTENSION: ICD-10-CM

## 2021-07-23 DIAGNOSIS — I25.83 CORONARY ARTERY DISEASE DUE TO LIPID RICH PLAQUE: Primary | ICD-10-CM

## 2021-07-23 DIAGNOSIS — E78.2 MIXED HYPERLIPIDEMIA: ICD-10-CM

## 2021-07-23 DIAGNOSIS — I25.10 CORONARY ARTERY DISEASE DUE TO LIPID RICH PLAQUE: Primary | ICD-10-CM

## 2021-07-23 RX ORDER — CHLORTHALIDONE 25 MG/1
TABLET ORAL
Qty: 90 TABLET | Refills: 0 | Status: SHIPPED | OUTPATIENT
Start: 2021-07-23 | End: 2021-08-25

## 2021-07-30 ENCOUNTER — TELEPHONE (OUTPATIENT)
Dept: CARDIOLOGY CLINIC | Age: 86
End: 2021-07-30

## 2021-07-30 DIAGNOSIS — E78.2 MIXED HYPERLIPIDEMIA: Primary | ICD-10-CM

## 2021-07-30 NOTE — TELEPHONE ENCOUNTER
----- Message from Ary Becker NP sent at 7/29/2021  3:34 PM EDT -----  K sl low at 3.4, just have him take K supplement twice a day. Repeat labs in 2 weeks.   Other labs ok including magnesium

## 2021-07-30 NOTE — TELEPHONE ENCOUNTER
Verified patient with two patient identifiers. Spoke with patient. Discussed lab results as noted below. Pt agrees to take potassium twice a day as per NP Kimberly, and to repeat labs in 2 weeks. Patient verbalized understanding.

## 2021-08-02 RX ORDER — ALLOPURINOL 100 MG/1
TABLET ORAL
Qty: 90 TABLET | Refills: 0 | Status: SHIPPED | OUTPATIENT
Start: 2021-08-02 | End: 2021-08-25

## 2021-08-16 ENCOUNTER — TELEPHONE (OUTPATIENT)
Dept: CARDIOLOGY CLINIC | Age: 86
End: 2021-08-16

## 2021-08-16 NOTE — TELEPHONE ENCOUNTER
----- Message from Gianna Bueno NP sent at 8/16/2021  9:02 AM EDT -----  K stable at 3.5, kidney fxn ok.    Continue K supplement as she remains on chlorthalidone

## 2021-08-17 NOTE — PROGRESS NOTES
HISTORY OF PRESENT ILLNESS  Tc Blackman is a 82 y.o. female. HPI     Last here 5/05/21. Pt is here to f/u on chronic conditions.     She was in hospital 4/28/21-4/29/21 for unstable angina     BP is 130/68  BP at home 121/71  Continues on chlorthalidone 25mg daily, metoprolol 50mg BID, and no longer on lotrel 5-20mg daily     And Norvasc 5 mg  She has some wch      Pt has not been checking her BS recently, No longer needs to      Had leukocytosis from prednisone that needed to be followed up on had hypokalemia in the hospital as well     Wt is 157 lbs, down 4 lbs x lov      Reviewed labs   Ordered labs     Pt follows with Dr. Wallene Duane (cardio) for CAD  Reviewed note 5/14/21:  CAD:  Admitted 4/28/21 with [place]  Negative troponin and ECG.    Patient has DM and was recently started on steroids for gout which may be have increased GERD, GI cocktail improved symptoms. Nuclear lexiscan study neg for ischemia. No further symptoms. EKG SR without acute s/t wave changes. Continue on ASA, BB, statin. Had previously been on Plavix due to history of Cypher stent, but discontinued it due to fall with a small amount of intracranial hemorrhage December 2018. Normal EF mild-mod MR per echo in 2012  HTN  Initial Creatinine 1.25, ARB held. continue on increased Norvasc, Chlorthalidone, and BB  Creatinine 5/5/21 0.82  HLD  7/2020 LDL at 80 On statin- due 4 labs in [place] will send lab slip   LDL at 68 in April 2019. Hx Mild hypokalemia:  K 3.5 5/5/21.    Being followed as she is on Chlorthalidone  History of ground-level fall 12/18 with a small amount of intracranial hemorrhage:  Off Plavix, continuing aspirin 81 without sequelae.   Continue current care and f/u 6 months  Will f/u 8/21  Continues off plavix  Continues on ASA 81mg      Pt previously followed with Dr. Nona Hammans (derm) for eczema  Pt will only f/u with this physician prn    Eczema has been good, not needing cream recently certain triggers make it worse       Continues on lipitor 20mg daily for cholesterol  Recall could not tolerate crestor     Continues allopurinol 100mg for gout prevention--no recent flares 08/18/21   She had been struggling a lot with gout and needing recurrent doses of prednisone so we started allopurinol last visit and she has been doing well    Pt lives with her      Pt is functionally independent       No memory concerns   Knows the month, date, year, location   Cannot recall 3/3 objects on 1st attempt (2/3) all 3 on 2nd attempt     ACP not on file. SDMs are her  Yanet Yoder), son and daughter-in-law. Provided information in past      PREVENTIVE:    Colonoscopy: declines further  Pap: 9/2010, declines further  Mammogram: declines further  Dexa: declines further  Tdap: 12/2018  Pneumovax: 11/18/2014  [id number]: 4/28/2016  Shingrix: both rounds completed   Flu shot:  12/01/20  Foot exam: 12/01/20  Microalbumin:12/20  A1c: 9/18 6.5, 4/19 6.0 8/19 6.2 2/20 6.2 7/20 6.4 12/20 6.1 4/21 6.2  Vainupea 50, 2021  Lipids: 4/21 LDL 40  Covid: both complete (pfizer)    Patient Active Problem List    Diagnosis Date Noted    Chest pain 04/28/2021    Unstable angina ([facility] 04/28/2021    Diabetes mellitus without complication ([facility] [address] [facility] 15/70/7839    Left carotid bruit 01/16/2015    Gout 06/11/2013    Mitral valve disorders(424.0) 05/08/2012    Mixed hyperlipidemia 03/07/2012    S/P Stent LAD (LISSA) 03/07/2012    Hypertension 09/16/2009    CAD (coronary artery disease) 09/16/2009     Current Outpatient Medications   Medication Sig Dispense Refill    amLODIPine (NORVASC) 5 mg tablet Take 5 mg by mouth daily.  atorvastatin (LIPITOR) 20 mg tablet Take 1 Tablet by mouth daily.  90 Tablet 2    allopurinoL (ZYLOPRIM) 100 mg tablet TAKE 1 TABLET BY MOUTH DAILY 90 Tablet 0    chlorthalidone (HYGROTON) 25 mg tablet Once daily 90 Tablet 0    potassium chloride (K-DUR, KLOR-CON) 20 mEq tablet Take 1 Tablet by mouth two (2) times a day. Once twice daily for 3 days the once daily 180 Tablet 1    loratadine (Claritin) 10 mg tablet Take 10 mg by mouth daily.  diphenhydrAMINE (Benadryl Allergy) 25 mg tablet Take 25 mg by mouth every six (6) hours as needed.  metoprolol tartrate (LOPRESSOR) 50 mg tablet Take 1 Tab by mouth two (2) times a day. 180 Tab 2    ASPIRIN LOW DOSE PO Take  by mouth daily. Lab Results   Component Value Date/Time    WBC 12.5 (H) 05/05/2021 01:59 PM    HGB 13.0 05/05/2021 01:59 PM    HCT 38.1 05/05/2021 01:59 PM    PLATELET 665 84/79/7085 01:59 PM    MCV 90 05/05/2021 01:59 PM     Lab Results   Component Value Date/Time    Cholesterol, total 130 05/25/2021 09:08 AM    HDL Cholesterol 50 05/25/2021 09:08 AM    LDL, calculated 62 05/25/2021 09:08 AM    LDL, calculated 40.6 04/28/2021 03:48 AM    Triglyceride 96 05/25/2021 09:08 AM    CHOL/HDL Ratio 2.1 04/28/2021 03:48 AM     Lab Results   Component Value Date/Time    GFR est non-AA >60 08/13/2021 08:00 AM    GFR est AA >60 08/13/2021 08:00 AM    Creatinine 0.75 08/13/2021 08:00 AM    BUN 23 (H) 08/13/2021 08:00 AM    Sodium 138 08/13/2021 08:00 AM    Potassium 3.5 08/13/2021 08:00 AM    Chloride 101 08/13/2021 08:00 AM    CO2 32 08/13/2021 08:00 AM    Magnesium 2.4 07/29/2021 08:40 AM        Review of Systems   Respiratory: Negative for shortness of breath. Cardiovascular: Negative for chest pain. Physical Exam  Constitutional:       General: She is not in acute distress. Appearance: Normal appearance. She is not ill-appearing, toxic-appearing or diaphoretic. HENT:      Head: Normocephalic and atraumatic. Right Ear: External ear normal.      Left Ear: External ear normal.   Eyes:      General:         Right eye: No discharge. Left eye: No discharge. Conjunctiva/sclera: Conjunctivae normal.      Pupils: Pupils are equal, round, and reactive to light. Neck:      Vascular: No carotid bruit. Cardiovascular:      Rate and Rhythm: Normal rate and regular rhythm. Heart sounds: No murmur heard. No friction rub. No gallop. Pulmonary:      Effort: No respiratory distress. Breath sounds: Normal breath sounds. No wheezing or rales. Chest:      Chest wall: No tenderness. Musculoskeletal:         General: Normal range of motion. Cervical back: Normal range of motion and neck supple. Skin:     General: Skin is warm and dry. Neurological:      Mental Status: She is alert and oriented to person, place, and time. Mental status is at baseline. Coordination: Coordination normal.      Gait: Gait normal.   Psychiatric:         Mood and Affect: Mood normal.         Behavior: Behavior normal.         ASSESSMENT and PLAN    ICD-10-CM ICD-9-CM    1. Diabetes mellitus without complication (HCC)      U28.2 250.00 MICROALBUMIN, UR, RAND W/ MICROALB/CREAT RATIO   Diet controlled check A1c   HEMOGLOBIN A1C WITH EAG      TSH 3RD GENERATION      CBC W/O DIFF      URIC ACID   2. Medicare annual wellness visit, subsequent  Z00.00 V70.0 MICROALBUMIN, UR, RAND W/ MICROALB/CREAT RATIO      HEMOGLOBIN A1C WITH EAG      TSH 3RD GENERATION      CBC W/O DIFF      URIC ACID   3. Unstable angina (HCC)  I20.0 411.1 MICROALBUMIN, UR, RAND W/ MICROALB/CREAT RATIO      HEMOGLOBIN A1C WITH EAG   Had recent admission for this ultimately has had had no recurrent symptoms had follow-up with cardiology she is currently stable   TSH 3RD GENERATION      CBC W/O DIFF      URIC ACID   4. Chronic gout without tophus, unspecified cause, unspecified site  M1A. 9XX0 274.02 MICROALBUMIN, UR, RAND W/ MICROALB/CREAT RATIO      HEMOGLOBIN A1C WITH EAG   Now on allopurinol will check uric acid level and adjust dosing as needed gout flare seem to have subsided we will continue allopurinol at this time   TSH 3RD GENERATION      CBC W/O DIFF      URIC ACID   5.  Mixed hyperlipidemia  E78.2 272.2 MICROALBUMIN, UR, RAND W/ MICROALB/CREAT RATIO      HEMOGLOBIN A1C WITH EAG   Controlled on Lipitor   TSH 3RD GENERATION      CBC W/O DIFF      URIC ACID   6. Coronary artery disease due to lipid rich plaque  I25.10 414.00 MICROALBUMIN, UR, RAND W/ MICROALB/CREAT RATIO    I25.83 414.3 HEMOGLOBIN A1C WITH EAG   Medically managed see above   TSH 3RD GENERATION      CBC W/O DIFF      URIC ACID   7. Leukocytosis, unspecified type  D72.829 288.60 MICROALBUMIN, UR, RAND W/ MICROALB/CREAT RATIO      HEMOGLOBIN A1C WITH EAG   Elevated white blood cells on last labs need to repeat this to ensure back to normal likely related to steroids we will evaluate further if elevation persists   TSH 3RD GENERATION      CBC W/O DIFF      URIC ACID   8. Hypokalemia  E87.6 276.8 MICROALBUMIN, UR, RAND W/ MICROALB/CREAT RATIO      HEMOGLOBIN A1C WITH EAG   Patient is now on Klor-Con levels are low repeat just done at goal continue   TSH 3RD GENERATION      CBC W/O DIFF      URIC ACID    9 hypertension blood pressure medications have been adjusted she is on Norvasc 5 mg chlorthalidone 25 metoprolol 50 twice daily continue no change dose        Scribed by Mildred Knight, as dictated by Dr. Shannon Mcbride. Current diagnosis and concerns discussed with pt at length. Pt understands risks and benefits or current treatment plan and medications, and accepts the treatment and medication with any possible risks. Pt asks appropriate questions, which were answered. Pt was instructed to call with any concerns or problems. I have reviewed the note documented by the scribe. The services provided are my own.   The documentation is accurate

## 2021-08-18 ENCOUNTER — OFFICE VISIT (OUTPATIENT)
Dept: INTERNAL MEDICINE CLINIC | Age: 86
End: 2021-08-18
Payer: MEDICARE

## 2021-08-18 VITALS
SYSTOLIC BLOOD PRESSURE: 130 MMHG | OXYGEN SATURATION: 97 % | DIASTOLIC BLOOD PRESSURE: 68 MMHG | RESPIRATION RATE: 16 BRPM | WEIGHT: 157.2 LBS | BODY MASS INDEX: 25.26 KG/M2 | TEMPERATURE: 97.9 F | HEART RATE: 85 BPM | HEIGHT: 66 IN

## 2021-08-18 DIAGNOSIS — I20.0 UNSTABLE ANGINA (HCC): ICD-10-CM

## 2021-08-18 DIAGNOSIS — I25.10 CORONARY ARTERY DISEASE DUE TO LIPID RICH PLAQUE: ICD-10-CM

## 2021-08-18 DIAGNOSIS — I25.83 CORONARY ARTERY DISEASE DUE TO LIPID RICH PLAQUE: ICD-10-CM

## 2021-08-18 DIAGNOSIS — D72.829 LEUKOCYTOSIS, UNSPECIFIED TYPE: ICD-10-CM

## 2021-08-18 DIAGNOSIS — E87.6 HYPOKALEMIA: ICD-10-CM

## 2021-08-18 DIAGNOSIS — E78.2 MIXED HYPERLIPIDEMIA: ICD-10-CM

## 2021-08-18 DIAGNOSIS — E11.9 DIABETES MELLITUS WITHOUT COMPLICATION (HCC): Primary | ICD-10-CM

## 2021-08-18 DIAGNOSIS — M1A.9XX0 CHRONIC GOUT WITHOUT TOPHUS, UNSPECIFIED CAUSE, UNSPECIFIED SITE: ICD-10-CM

## 2021-08-18 DIAGNOSIS — Z00.00 MEDICARE ANNUAL WELLNESS VISIT, SUBSEQUENT: ICD-10-CM

## 2021-08-18 LAB
CREAT UR-MCNC: 199 MG/DL
ERYTHROCYTE [DISTWIDTH] IN BLOOD BY AUTOMATED COUNT: 14.4 % (ref 11.5–14.5)
EST. AVERAGE GLUCOSE BLD GHB EST-MCNC: 140 MG/DL
HBA1C MFR BLD: 6.5 % (ref 4–5.6)
HCT VFR BLD AUTO: 38.6 % (ref 35–47)
HGB BLD-MCNC: 12.5 G/DL (ref 11.5–16)
MCH RBC QN AUTO: 30.1 PG (ref 26–34)
MCHC RBC AUTO-ENTMCNC: 32.4 G/DL (ref 30–36.5)
MCV RBC AUTO: 93 FL (ref 80–99)
MICROALBUMIN UR-MCNC: 0.82 MG/DL
MICROALBUMIN/CREAT UR-RTO: 4 MG/G (ref 0–30)
NRBC # BLD: 0 K/UL (ref 0–0.01)
NRBC BLD-RTO: 0 PER 100 WBC
PLATELET # BLD AUTO: 214 K/UL (ref 150–400)
PMV BLD AUTO: 11.9 FL (ref 8.9–12.9)
RBC # BLD AUTO: 4.15 M/UL (ref 3.8–5.2)
TSH SERPL DL<=0.05 MIU/L-ACNC: 1.52 UIU/ML (ref 0.36–3.74)
UR CULT HOLD, URHOLD: NORMAL
URATE SERPL-MCNC: 5.4 MG/DL (ref 2.6–6)
WBC # BLD AUTO: 8.9 K/UL (ref 3.6–11)

## 2021-08-18 PROCEDURE — 1101F PT FALLS ASSESS-DOCD LE1/YR: CPT | Performed by: INTERNAL MEDICINE

## 2021-08-18 PROCEDURE — G0439 PPPS, SUBSEQ VISIT: HCPCS | Performed by: INTERNAL MEDICINE

## 2021-08-18 PROCEDURE — G8536 NO DOC ELDER MAL SCRN: HCPCS | Performed by: INTERNAL MEDICINE

## 2021-08-18 PROCEDURE — G8510 SCR DEP NEG, NO PLAN REQD: HCPCS | Performed by: INTERNAL MEDICINE

## 2021-08-18 PROCEDURE — G0463 HOSPITAL OUTPT CLINIC VISIT: HCPCS | Performed by: INTERNAL MEDICINE

## 2021-08-18 PROCEDURE — G8419 CALC BMI OUT NRM PARAM NOF/U: HCPCS | Performed by: INTERNAL MEDICINE

## 2021-08-18 PROCEDURE — G8427 DOCREV CUR MEDS BY ELIG CLIN: HCPCS | Performed by: INTERNAL MEDICINE

## 2021-08-18 PROCEDURE — G8400 PT W/DXA NO RESULTS DOC: HCPCS | Performed by: INTERNAL MEDICINE

## 2021-08-18 PROCEDURE — G8754 DIAS BP LESS 90: HCPCS | Performed by: INTERNAL MEDICINE

## 2021-08-18 PROCEDURE — 99213 OFFICE O/P EST LOW 20 MIN: CPT | Performed by: INTERNAL MEDICINE

## 2021-08-18 PROCEDURE — G8752 SYS BP LESS 140: HCPCS | Performed by: INTERNAL MEDICINE

## 2021-08-18 PROCEDURE — 1090F PRES/ABSN URINE INCON ASSESS: CPT | Performed by: INTERNAL MEDICINE

## 2021-08-18 RX ORDER — AMLODIPINE BESYLATE 5 MG/1
5 TABLET ORAL DAILY
COMMUNITY
End: 2021-09-28

## 2021-08-18 RX ORDER — ATORVASTATIN CALCIUM 20 MG/1
20 TABLET, FILM COATED ORAL DAILY
Qty: 90 TABLET | Refills: 2 | Status: SHIPPED | OUTPATIENT
Start: 2021-08-18 | End: 2022-05-25 | Stop reason: SDUPTHER

## 2021-08-18 NOTE — PATIENT INSTRUCTIONS

## 2021-08-18 NOTE — PROGRESS NOTES
This is the Subsequent Medicare Annual Wellness Exam, performed 12 months or more after the Initial AWV or the last Subsequent AWV    I have reviewed the patient's medical history in detail and updated the computerized patient record. Assessment/Plan   Education and counseling provided:  Are appropriate based on today's review and evaluation    1. Medicare annual wellness visit, subsequent       Depression Risk Factor Screening     3 most recent PHQ Screens 8/18/2021   Little interest or pleasure in doing things Not at all   Feeling down, depressed, irritable, or hopeless Not at all   Total Score PHQ 2 0       Alcohol Risk Screen    Do you average more than 1 drink per night or more than 7 drinks a week:  No    On any one occasion in the past three months have you have had more than 3 drinks containing alcohol:  No        Functional Ability and Level of Safety    Hearing: Hearing is good. Activities of Daily Living: The home contains: handrails and grab bars  Patient does total self care      Ambulation: with no difficulty     Fall Risk:  Fall Risk Assessment, last 12 mths 8/18/2021   Able to walk? Yes   Fall in past 12 months? 0   Do you feel unsteady? -   Are you worried about falling -   Number of falls in past 12 months -   Fall with injury?  -      Abuse Screen:  Patient is not abused     No memory concerns   Pt knows the month, day and year   Can recall 3/3 objects     Cognitive Screening    Has your family/caregiver stated any concerns about your memory: no     Cognitive Screening: Normal - Mini Cog Test     No memory concerns   Pt knows the month, day and year   Can recall 3/3 objects     Health Maintenance Due     Health Maintenance Due   Topic Date Due    Eye Exam Retinal or Dilated  09/10/2015       Patient Care Team   Patient Care Team:  Mona Ledezma MD as PCP - General (Internal Medicine)  Mona Ledezma MD as PCP - [facility] Empaneled Provider  Timur Wilcox  (Ophthalmology)  Gabriela Jimenez Jessica Gandara (Inactive) (Dermatology)  Silver Sever, MD (Cardiology)  barber (Ophthalmology)  Taylor Baum MD (Neurosurgery)     updated    History     Patient Active Problem List   Diagnosis Code    Hypertension I10    CAD (coronary artery disease) I25.10    Mixed hyperlipidemia E78.2    S/P Stent LAD (LISSA) Z95.820    Mitral valve disorders(424.0) I05.9    Gout M10.9    Left carotid bruit R09.89    Diabetes mellitus without complication (HCC) W68.8    Chest pain R07.9    Unstable angina ([address] I20.0     Past Medical History:   Diagnosis Date    CAD (coronary artery disease) 9/16/2009    EF=65-70%; Mild-mod MR; Dr Kim Mcdermott    Diabetes [facility]) 9/16/2009    boarder line controlle by diet and exercise.  Hyperlipidemia 9/16/2009    Hypertension 9/16/2009    Valvular heart disease       Past Surgical History:   Procedure Laterality Date    HX CORONARY STENT PLACEMENT      HX HEART CATHETERIZATION      HX ORTHOPAEDIC  10/2013    right ankle    HX ORTHOPAEDIC  2/20/14    INCISION AND DRAINAGE RIGHT ANKLE AND HARDWARE REMOVAL    [place] CARDIAC SURG PROCEDURE UNLIST  2008    cardiac stent     Current Outpatient Medications   Medication Sig Dispense Refill    amLODIPine (NORVASC) 5 mg tablet Take 5 mg by mouth daily.  atorvastatin (LIPITOR) 20 mg tablet Take 1 Tablet by mouth daily. 90 Tablet 2    allopurinoL (ZYLOPRIM) 100 mg tablet TAKE 1 TABLET BY MOUTH DAILY 90 Tablet 0    chlorthalidone (HYGROTON) 25 mg tablet Once daily 90 Tablet 0    potassium chloride (K-DUR, KLOR-CON) 20 mEq tablet Take 1 Tablet by mouth two (2) times a day. Once twice daily for 3 days the once daily 180 Tablet 1    loratadine (Claritin) 10 mg tablet Take 10 mg by mouth daily.  diphenhydrAMINE (Benadryl Allergy) 25 mg tablet Take 25 mg by mouth every six (6) hours as needed.  metoprolol tartrate (LOPRESSOR) 50 mg tablet Take 1 Tab by mouth two (2) times a day.  180 Tab 2    ASPIRIN LOW DOSE PO Take  by mouth daily.      HYDROcodone-acetaminophen (NORCO) 5-325 mg per tablet Take  by mouth. (Patient not taking: Reported on 8/18/2021)       Allergies   Allergen Reactions    Crestor [Rosuvastatin] Unknown (comments)       Family History   Problem Relation Age of Onset    Hypertension Mother     Heart Disease Mother     Hypertension Father     Heart Disease Father      Social History     Tobacco Use    Smoking status: Never Smoker    Smokeless tobacco: Never Used   Substance Use Topics    Alcohol use: No       ACP not on file. SDMs are her  Zev Monk), son and daughter-in-law. Provided information         Colonoscopy: declines further  Pap: 9/2010, declines further  Mammogram: declines further  Dexa: declines further    Tdap: 12/2018  Pneumovax: 11/18/2014  [id number]: 4/28/2016  Shingrix: both rounds completed   Flu shot:  12/01/20      A1c:  4/21 6.2 due now    [facility], 2021 annual get notes for review    Lipids: 4/21 LDL 40 annual    Covid: both complete (pfizer)    Medication reconciliation completed by MA and reviewed by me. Medical/surgical/social/family history reviewed and updated by me. Patient provided AVS and preventative screening table. Patient verbalized understanding of all information discussed.     Edenilson Camacho MD

## 2021-08-25 RX ORDER — ALLOPURINOL 100 MG/1
TABLET ORAL
Qty: 90 TABLET | Refills: 0 | Status: SHIPPED | OUTPATIENT
Start: 2021-08-25 | End: 2022-01-25

## 2021-08-30 ENCOUNTER — OFFICE VISIT (OUTPATIENT)
Dept: CARDIOLOGY CLINIC | Age: 86
End: 2021-08-30
Payer: MEDICARE

## 2021-08-30 VITALS
SYSTOLIC BLOOD PRESSURE: 142 MMHG | BODY MASS INDEX: 25.15 KG/M2 | HEIGHT: 66 IN | WEIGHT: 156.5 LBS | RESPIRATION RATE: 16 BRPM | OXYGEN SATURATION: 98 % | DIASTOLIC BLOOD PRESSURE: 69 MMHG | HEART RATE: 66 BPM

## 2021-08-30 DIAGNOSIS — I44.7 LBBB (LEFT BUNDLE BRANCH BLOCK): ICD-10-CM

## 2021-08-30 DIAGNOSIS — I10 ESSENTIAL HYPERTENSION: ICD-10-CM

## 2021-08-30 DIAGNOSIS — I25.10 CORONARY ARTERY DISEASE INVOLVING NATIVE CORONARY ARTERY OF NATIVE HEART WITHOUT ANGINA PECTORIS: Primary | ICD-10-CM

## 2021-08-30 DIAGNOSIS — E78.2 MIXED HYPERLIPIDEMIA: ICD-10-CM

## 2021-08-30 PROBLEM — I20.0 UNSTABLE ANGINA (HCC): Status: RESOLVED | Noted: 2021-04-28 | Resolved: 2021-08-30

## 2021-08-30 PROCEDURE — 93005 ELECTROCARDIOGRAM TRACING: CPT | Performed by: INTERNAL MEDICINE

## 2021-08-30 PROCEDURE — G8432 DEP SCR NOT DOC, RNG: HCPCS | Performed by: INTERNAL MEDICINE

## 2021-08-30 PROCEDURE — G8427 DOCREV CUR MEDS BY ELIG CLIN: HCPCS | Performed by: INTERNAL MEDICINE

## 2021-08-30 PROCEDURE — G8753 SYS BP > OR = 140: HCPCS | Performed by: INTERNAL MEDICINE

## 2021-08-30 PROCEDURE — G8400 PT W/DXA NO RESULTS DOC: HCPCS | Performed by: INTERNAL MEDICINE

## 2021-08-30 PROCEDURE — 99214 OFFICE O/P EST MOD 30 MIN: CPT | Performed by: INTERNAL MEDICINE

## 2021-08-30 PROCEDURE — G8536 NO DOC ELDER MAL SCRN: HCPCS | Performed by: INTERNAL MEDICINE

## 2021-08-30 PROCEDURE — 93010 ELECTROCARDIOGRAM REPORT: CPT | Performed by: INTERNAL MEDICINE

## 2021-08-30 PROCEDURE — 1090F PRES/ABSN URINE INCON ASSESS: CPT | Performed by: INTERNAL MEDICINE

## 2021-08-30 PROCEDURE — G8754 DIAS BP LESS 90: HCPCS | Performed by: INTERNAL MEDICINE

## 2021-08-30 PROCEDURE — G8419 CALC BMI OUT NRM PARAM NOF/U: HCPCS | Performed by: INTERNAL MEDICINE

## 2021-08-30 PROCEDURE — G0463 HOSPITAL OUTPT CLINIC VISIT: HCPCS | Performed by: INTERNAL MEDICINE

## 2021-08-30 PROCEDURE — 1101F PT FALLS ASSESS-DOCD LE1/YR: CPT | Performed by: INTERNAL MEDICINE

## 2021-08-30 NOTE — PROGRESS NOTES
Chief Complaint   Patient presents with    Follow-up     annual f/u    Coronary Artery Disease     no cardiac issues     1. Have you been to the ER, urgent care clinic since your last visit? Hospitalized since your last visit? No    2. Have you seen or consulted any other health care providers outside of the [address] [place] [address] since your last visit? Include any pap smears or colon screening.  No

## 2021-08-30 NOTE — PROGRESS NOTES
Britt Brown, MADYSON-BC    Subjective/HPI:     Chipper Collet is a 82 y.o. female is here for routine f/u. She has a PMHx of CAD, DM2, HTN, HLD. She feels well. Stopped eating heavy foods at night time and has since been much better. Goes on a walk every other day for 2 miles, in about 45 minutes. Denies complaints of chest pains, dizziness, orthopnea, PND or edema. Denies palpitation symptoms or shortness of breath with exertion. Past Medical History:   Diagnosis Date    CAD (coronary artery disease) 9/16/2009    EF=65-70%; Mild-mod MR; Dr Carolyn Ayala    Diabetes [facility]) 9/16/2009    boarder line controlle by diet and exercise.      Hyperlipidemia 9/16/2009    Hypertension 9/16/2009    Valvular heart disease        Past Surgical History:   Procedure Laterality Date    HX CORONARY STENT PLACEMENT      HX HEART CATHETERIZATION      HX ORTHOPAEDIC  10/2013    right ankle    HX ORTHOPAEDIC  2/20/14    INCISION AND DRAINAGE RIGHT ANKLE AND HARDWARE REMOVAL    [place] CARDIAC SURG PROCEDURE UNLIST  2008    cardiac stent       Family History   Problem Relation Age of Onset    Hypertension Mother     Heart Disease Mother     Hypertension Father     Heart Disease Father        Social History     Socioeconomic History    Marital status:      Spouse name: Not on file    Number of children: Not on file    Years of education: Not on file    Highest education level: Not on file   Occupational History    Not on file   Tobacco Use    Smoking status: Never Smoker    Smokeless tobacco: Never Used   Substance and Sexual Activity    Alcohol use: No    Drug use: No    Sexual activity: Yes     Partners: Male     Birth control/protection: None   Other Topics Concern    Not on file   Social History Narrative    Not on file     Social Determinants of Health     Financial Resource Strain:     Difficulty of Paying Living Expenses:    Food Insecurity:     Worried About Running Out of Food in the Last Year:    [address] in the Last Year:    Transportation Needs:     Lack of Transportation (Medical):  Lack of Transportation (Non-Medical):    Physical Activity:     Days of Exercise per Week:     Minutes of Exercise per Session:    Stress:     Feeling of Stress :    Social Connections:     Frequency of Communication with Friends and Family:     Frequency of Social Gatherings with Friends and Family:     Attends Rastafarian Services:     Active Member of Clubs or Organizations:     Attends Club or Organization Meetings:     Marital Status:    Intimate Partner Violence:     Fear of Current or Ex-Partner:     Emotionally Abused:     Physically Abused:     Sexually Abused: Allergies   Allergen Reactions    Crestor [Rosuvastatin] Unknown (comments)       Current Outpatient Medications on File Prior to Visit   Medication Sig Dispense Refill    metoprolol tartrate (LOPRESSOR) 50 mg tablet TAKE 1 TABLET BY MOUTH TWICE DAILY 180 Tablet 3    chlorthalidone (HYGROTON) 25 mg tablet TAKE 1 TABLET BY MOUTH DAILY 90 Tablet 1    allopurinoL (ZYLOPRIM) 100 mg tablet TAKE 1 TABLET BY MOUTH DAILY 90 Tablet 0    amLODIPine (NORVASC) 5 mg tablet Take 5 mg by mouth daily.  atorvastatin (LIPITOR) 20 mg tablet Take 1 Tablet by mouth daily. 90 Tablet 2    potassium chloride (K-DUR, KLOR-CON) 20 mEq tablet Take 1 Tablet by mouth two (2) times a day. Once twice daily for 3 days the once daily 180 Tablet 1    loratadine (Claritin) 10 mg tablet Take 10 mg by mouth daily.  diphenhydrAMINE (Benadryl Allergy) 25 mg tablet Take 25 mg by mouth every six (6) hours as needed.  ASPIRIN LOW DOSE PO Take  by mouth daily. No current facility-administered medications on file prior to visit. Review of Symptoms:    Review of Systems   Constitutional: Negative for chills, fever and weight loss. HENT: Negative for nosebleeds. Eyes: Negative for blurred vision and double vision. Respiratory: Negative for cough, shortness of breath and wheezing. Cardiovascular: Negative for chest pain, palpitations, orthopnea, leg swelling and PND. Skin: Negative for rash. Neurological: Negative for dizziness and loss of consciousness. Physical Exam:      General: Well developed, in no acute distress, cooperative and alert  Heart:  reg rate and rhythm; normal S1/S2; no murmurs, no gallops or rubs. Respiratory: Clear bilaterally x 4, no wheezing or rales  Extremities:  Normal cap refill, no cyanosis, atraumatic. No edema. Vascular: 2+ pulses symmetric in all extremities    Vitals:    08/30/21 0842   Height: 5' 6\" (1.676 m)   Weight: 156 lb 8 oz (71 kg)       Lab Results   Component Value Date/Time    Cholesterol, total 130 05/25/2021 09:08 AM    HDL Cholesterol 50 05/25/2021 09:08 AM    LDL, calculated 62 05/25/2021 09:08 AM    LDL, calculated 40.6 04/28/2021 03:48 AM    VLDL, calculated 18 05/25/2021 09:08 AM    VLDL, calculated 19.4 04/28/2021 03:48 AM    Triglyceride 96 05/25/2021 09:08 AM    CHOL/HDL Ratio 2.1 04/28/2021 03:48 AM     Lab Results   Component Value Date/Time    WBC 8.9 08/18/2021 11:37 AM    HGB 12.5 08/18/2021 11:37 AM    HCT 38.6 08/18/2021 11:37 AM    PLATELET 535 57/43/1039 11:37 AM    MCV 93.0 08/18/2021 11:37 AM     Lab Results   Component Value Date/Time    Sodium 138 08/13/2021 08:00 AM    Potassium 3.5 08/13/2021 08:00 AM    Chloride 101 08/13/2021 08:00 AM    CO2 32 08/13/2021 08:00 AM    Anion gap 5 08/13/2021 08:00 AM    Glucose 127 (H) 08/13/2021 08:00 AM    BUN 23 (H) 08/13/2021 08:00 AM    Creatinine 0.75 08/13/2021 08:00 AM    BUN/Creatinine ratio 31 (H) 08/13/2021 08:00 AM    GFR est AA >60 08/13/2021 08:00 AM    GFR est non-AA >60 08/13/2021 08:00 AM    Calcium 9.5 08/13/2021 08:00 AM    Bilirubin, total 0.3 05/25/2021 09:08 AM    Alk.  phosphatase 99 05/25/2021 09:08 AM    Protein, total 5.9 (L) 05/25/2021 09:08 AM    Albumin 3.9 05/25/2021 09:08 AM Globulin 3.7 04/28/2021 03:48 AM    A-G Ratio 2.0 05/25/2021 09:08 AM    ALT (SGPT) 20 05/25/2021 09:08 AM    AST (SGOT) 25 05/25/2021 09:08 AM       ECG done today sinus rhythm, LBBB     Assessment:       ICD-10-CM ICD-9-CM    1. Coronary artery disease involving native coronary artery of native heart without angina pectoris  I25.10 414.01 AMB POC EKG ROUTINE W/ 12 LEADS, INTER & REP      ECHO ADULT COMPLETE   2. Essential hypertension  I10 401.9    3. Mixed hyperlipidemia  E78.2 272.2    4. LBBB (left bundle branch block)  I44.7 426.3 ECHO ADULT COMPLETE        Plan:     1. Coronary artery disease involving native coronary artery of native heart without angina pectoris  Hx of Cypher stent  Lexiscan stress test done 4/2021 without evidence of ischemia  Echo done 5/2012 with preserved LVEF 65%  Continue ASA, statin, BB therapy. Previously on Plavix, however was d/c'd due to small ICH    2. Essential hypertension  BP controlled. Continue anti-hypertensive therapy and low sodium diet    3. Mixed hyperlipidemia  LDL 62 in 5/2021  Continue statin therapy and low fat, low cholesterol diet  Lipids managed by PCP    4.   LBBB  New on EKG today  Lexisan stress test done 4/2021 without evidence of ischemia  Denies any exertional symptoms, good functional capacity  Will get echocardiogram    F/u with Dr. Hosie Holstein in 1 month

## 2021-09-13 ENCOUNTER — ANCILLARY PROCEDURE (OUTPATIENT)
Dept: CARDIOLOGY CLINIC | Age: 86
End: 2021-09-13
Payer: MEDICARE

## 2021-09-13 VITALS
DIASTOLIC BLOOD PRESSURE: 69 MMHG | BODY MASS INDEX: 25.07 KG/M2 | HEIGHT: 66 IN | WEIGHT: 156 LBS | SYSTOLIC BLOOD PRESSURE: 142 MMHG

## 2021-09-13 DIAGNOSIS — I44.7 LBBB (LEFT BUNDLE BRANCH BLOCK): ICD-10-CM

## 2021-09-13 DIAGNOSIS — I25.10 CORONARY ARTERY DISEASE INVOLVING NATIVE CORONARY ARTERY OF NATIVE HEART WITHOUT ANGINA PECTORIS: ICD-10-CM

## 2021-09-13 LAB
ECHO AO ASC DIAM: 3.01 CM
ECHO AO ROOT DIAM: 2.75 CM
ECHO AV PEAK GRADIENT: 8.02 MMHG
ECHO AV PEAK VELOCITY: 141.64 CM/S
ECHO EST RA PRESSURE: 3 MMHG
ECHO LA AREA 4C: 19.21 CM2
ECHO LA MAJOR AXIS: 4.03 CM
ECHO LA MINOR AXIS: 2.24 CM
ECHO LA VOL 2C: 41.57 ML (ref 22–52)
ECHO LA VOL 4C: 58.47 ML (ref 22–52)
ECHO LA VOL BP: 51.46 ML (ref 22–52)
ECHO LA VOL/BSA BIPLANE: 28.59 ML/M2 (ref 16–28)
ECHO LA VOLUME INDEX A2C: 23.09 ML/M2 (ref 16–28)
ECHO LA VOLUME INDEX A4C: 32.48 ML/M2 (ref 16–28)
ECHO LV E' LATERAL VELOCITY: 8.1 CM/S
ECHO LV INTERNAL DIMENSION DIASTOLIC: 3.55 CM (ref 3.9–5.3)
ECHO LV INTERNAL DIMENSION SYSTOLIC: 1.98 CM
ECHO LV ISOVOLUMETRIC RELAXATION TIME (IVRT): 100.86 MS
ECHO LV IVSD: 1.54 CM (ref 0.6–0.9)
ECHO LV MASS 2D: 176.4 G (ref 67–162)
ECHO LV MASS INDEX 2D: 98 G/M2 (ref 43–95)
ECHO LV POSTERIOR WALL DIASTOLIC: 1.26 CM (ref 0.6–0.9)
ECHO LVOT PEAK GRADIENT: 7.44 MMHG
ECHO LVOT PEAK VELOCITY: 136.34 CM/S
ECHO MV "A" WAVE DURATION: 175.07 MS
ECHO MV A VELOCITY: 128.88 CM/S
ECHO MV AREA PHT: 2.45 CM2
ECHO MV E DECELERATION TIME (DT): 310.21 MS
ECHO MV E VELOCITY: 86.47 CM/S
ECHO MV E/A RATIO: 0.67
ECHO MV E/E' LATERAL: 10.68
ECHO MV PRESSURE HALF TIME (PHT): 89.96 MS
ECHO PVEIN A DURATION: 154.14 MS
ECHO PVEIN A VELOCITY: 28.58 CM/S
ECHO RIGHT VENTRICULAR SYSTOLIC PRESSURE (RVSP): 33.62 MMHG
ECHO RV TAPSE: 2.24 CM (ref 1.5–2)
ECHO TV REGURGITANT MAX VELOCITY: 276.69 CM/S
ECHO TV REGURGITANT PEAK GRADIENT: 30.62 MMHG

## 2021-09-13 PROCEDURE — 93306 TTE W/DOPPLER COMPLETE: CPT | Performed by: INTERNAL MEDICINE

## 2021-09-14 ENCOUNTER — TELEPHONE (OUTPATIENT)
Dept: CARDIOLOGY CLINIC | Age: 86
End: 2021-09-14

## 2021-09-14 NOTE — PROGRESS NOTES
Bethany,    Please call patient. Echo appears normal, EF 60-65%, no concern for heart failure at this time. The heart muscle is thickened,which can happen over time due to hypertension. Has mild leaking of her mitral valve, again, can happen as we age and with hx of HTN. This is not concerning.   Keep fu with Dr. Alejandrina Zimmerman as scheduled    Thanks,  Latricia Shoemaker

## 2021-09-14 NOTE — TELEPHONE ENCOUNTER
----- Message from Jose Jordan NP sent at 9/14/2021  8:13 AM EDT -----  Nicolette Renner,    Please call patient. Echo appears normal, EF 60-65%, no concern for heart failure at this time. The heart muscle is thickened,which can happen over time due to hypertension. Has mild leaking of her mitral valve, again, can happen as we age and with hx of HTN. This is not concerning.   Keep fu with Dr. Kimberly Duggan as scheduled    Thanks,  Nisreen Morris

## 2021-09-30 ENCOUNTER — OFFICE VISIT (OUTPATIENT)
Dept: CARDIOLOGY CLINIC | Age: 86
End: 2021-09-30
Payer: MEDICARE

## 2021-09-30 VITALS
DIASTOLIC BLOOD PRESSURE: 68 MMHG | HEART RATE: 76 BPM | WEIGHT: 158.9 LBS | HEIGHT: 66 IN | RESPIRATION RATE: 18 BRPM | BODY MASS INDEX: 25.54 KG/M2 | OXYGEN SATURATION: 98 % | SYSTOLIC BLOOD PRESSURE: 160 MMHG

## 2021-09-30 DIAGNOSIS — I10 ESSENTIAL HYPERTENSION: ICD-10-CM

## 2021-09-30 DIAGNOSIS — E11.9 DIABETES MELLITUS WITHOUT COMPLICATION (HCC): ICD-10-CM

## 2021-09-30 DIAGNOSIS — E78.2 MIXED HYPERLIPIDEMIA: ICD-10-CM

## 2021-09-30 DIAGNOSIS — Z95.820 S/P ANGIOPLASTY WITH STENT: ICD-10-CM

## 2021-09-30 DIAGNOSIS — I25.10 CORONARY ARTERY DISEASE INVOLVING NATIVE CORONARY ARTERY OF NATIVE HEART WITHOUT ANGINA PECTORIS: Primary | ICD-10-CM

## 2021-09-30 PROCEDURE — G8753 SYS BP > OR = 140: HCPCS | Performed by: INTERNAL MEDICINE

## 2021-09-30 PROCEDURE — G8536 NO DOC ELDER MAL SCRN: HCPCS | Performed by: INTERNAL MEDICINE

## 2021-09-30 PROCEDURE — 93005 ELECTROCARDIOGRAM TRACING: CPT | Performed by: INTERNAL MEDICINE

## 2021-09-30 PROCEDURE — G8754 DIAS BP LESS 90: HCPCS | Performed by: INTERNAL MEDICINE

## 2021-09-30 PROCEDURE — 99214 OFFICE O/P EST MOD 30 MIN: CPT | Performed by: INTERNAL MEDICINE

## 2021-09-30 PROCEDURE — 1101F PT FALLS ASSESS-DOCD LE1/YR: CPT | Performed by: INTERNAL MEDICINE

## 2021-09-30 PROCEDURE — 1090F PRES/ABSN URINE INCON ASSESS: CPT | Performed by: INTERNAL MEDICINE

## 2021-09-30 PROCEDURE — G8510 SCR DEP NEG, NO PLAN REQD: HCPCS | Performed by: INTERNAL MEDICINE

## 2021-09-30 PROCEDURE — G0463 HOSPITAL OUTPT CLINIC VISIT: HCPCS | Performed by: INTERNAL MEDICINE

## 2021-09-30 PROCEDURE — G8419 CALC BMI OUT NRM PARAM NOF/U: HCPCS | Performed by: INTERNAL MEDICINE

## 2021-09-30 PROCEDURE — G8400 PT W/DXA NO RESULTS DOC: HCPCS | Performed by: INTERNAL MEDICINE

## 2021-09-30 PROCEDURE — 93010 ELECTROCARDIOGRAM REPORT: CPT | Performed by: INTERNAL MEDICINE

## 2021-09-30 PROCEDURE — G8427 DOCREV CUR MEDS BY ELIG CLIN: HCPCS | Performed by: INTERNAL MEDICINE

## 2021-09-30 NOTE — LETTER
9/30/2021    Patient: Melania Parker   YOB: 1935   Date of Visit: 9/30/2021     Dominic Bear MD  [address] [facility]  [address]  [facility]  [address] [facility] [address]    Dear Dominic Bear MD,      Thank you for referring Ms. Angy Rodriguez to [address] [place] [address] for evaluation. My notes for this consultation are attached. If you have questions, please do not hesitate to call me. I look forward to following your patient along with you.       Sincerely,    Libertad Romo MD

## 2021-09-30 NOTE — PROGRESS NOTES
1. Have you been to the ER, urgent care clinic since your last visit? Hospitalized since your last visit? No    2. Have you seen or consulted any other health care providers outside of the [address] [place] [address] since your last visit? Include any pap smears or colon screening.  No     Chief Complaint   Patient presents with    Follow-up     1 mo f/up. questions about test

## 2021-09-30 NOTE — PROGRESS NOTES
Gianna Esposito, [facility]    Subjective/HPI:     Jarett Gonzalez is a 82 y.o. female is here for routine f/u. She has a PMHx of CAD, DM2, HTN, HLD. Last visit, had echo done due to new LBBB; previous stress test done 4/2021 was without evidence of ischemia. Continues to feel great. Still walking every day, eats only baked chicken and plenty of greens. No new symptoms since last visit, except for arthritic pain of the left shoulder and left leg, which are chronic. Past Medical History:   Diagnosis Date    CAD (coronary artery disease) 9/16/2009    EF=65-70%; Mild-mod MR; Dr Kennedy Shannon    Diabetes [facility]) 9/16/2009    boarder line controlle by diet and exercise.      Hyperlipidemia 9/16/2009    Hypertension 9/16/2009    Valvular heart disease        Past Surgical History:   Procedure Laterality Date    HX CORONARY STENT PLACEMENT      HX HEART CATHETERIZATION      HX ORTHOPAEDIC  10/2013    right ankle    HX ORTHOPAEDIC  2/20/14    INCISION AND DRAINAGE RIGHT ANKLE AND HARDWARE REMOVAL    ND CARDIAC SURG PROCEDURE UNLIST  2008    cardiac stent       Family History   Problem Relation Age of Onset    Hypertension Mother     Heart Disease Mother     Hypertension Father     Heart Disease Father        Social History     Socioeconomic History    Marital status:      Spouse name: Not on file    Number of children: Not on file    Years of education: Not on file    Highest education level: Not on file   Occupational History    Not on file   Tobacco Use    Smoking status: Never Smoker    Smokeless tobacco: Never Used   Substance and Sexual Activity    Alcohol use: No    Drug use: No    Sexual activity: Yes     Partners: Male     Birth control/protection: None   Other Topics Concern    Not on file   Social History Narrative    Not on file     Social Determinants of Health     Financial Resource Strain:     Difficulty of Paying Living Expenses:    Food Insecurity:     Worried About Running Out of Food in the Last Year:     Lu of Food in the Last Year:    Transportation Needs:     Lack of Transportation (Medical):  Lack of Transportation (Non-Medical):    Physical Activity:     Days of Exercise per Week:     Minutes of Exercise per Session:    Stress:     Feeling of Stress :    Social Connections:     Frequency of Communication with Friends and Family:     Frequency of Social Gatherings with Friends and Family:     Attends Sikh Services:     Active Member of Clubs or Organizations:     Attends Club or Organization Meetings:     Marital Status:    Intimate Partner Violence:     Fear of Current or Ex-Partner:     Emotionally Abused:     Physically Abused:     Sexually Abused: Allergies   Allergen Reactions    Crestor [Rosuvastatin] Unknown (comments)       Current Outpatient Medications on File Prior to Visit   Medication Sig Dispense Refill    amLODIPine (NORVASC) 5 mg tablet TAKE 2 TABLETS BY MOUTH DAILY 180 Tablet 1    metoprolol tartrate (LOPRESSOR) 50 mg tablet TAKE 1 TABLET BY MOUTH TWICE DAILY 180 Tablet 3    chlorthalidone (HYGROTON) 25 mg tablet TAKE 1 TABLET BY MOUTH DAILY 90 Tablet 1    allopurinoL (ZYLOPRIM) 100 mg tablet TAKE 1 TABLET BY MOUTH DAILY 90 Tablet 0    atorvastatin (LIPITOR) 20 mg tablet Take 1 Tablet by mouth daily. 90 Tablet 2    potassium chloride (K-DUR, KLOR-CON) 20 mEq tablet Take 1 Tablet by mouth two (2) times a day. Once twice daily for 3 days the once daily 180 Tablet 1    loratadine (Claritin) 10 mg tablet Take 10 mg by mouth daily.  diphenhydrAMINE (Benadryl Allergy) 25 mg tablet Take 25 mg by mouth every six (6) hours as needed.  ASPIRIN LOW DOSE PO Take  by mouth daily. No current facility-administered medications on file prior to visit. Review of Symptoms:    Review of Systems   Constitutional: Negative for chills, fever and weight loss. HENT: Negative for nosebleeds.     Eyes: Negative for blurred vision and double vision. Respiratory: Negative for cough, shortness of breath and wheezing. Cardiovascular: Negative for chest pain, palpitations, orthopnea, leg swelling and PND. Skin: Negative for rash. Neurological: Negative for dizziness and loss of consciousness. Physical Exam:      General: Well developed, in no acute distress, cooperative and alert  Heart:  reg rate and rhythm; normal S1/S2; no murmurs, no gallops or rubs. Respiratory: Clear bilaterally x 4, no wheezing or rales  Extremities:  Normal cap refill, no cyanosis, atraumatic. No edema.   Vascular: 2+ pulses symmetric in all extremities    Vitals:    09/30/21 0843   BP: (!) 160/68   BP 1 Location: Right arm   BP Patient Position: Sitting   BP Cuff Size: Adult   Pulse: 76   Resp: 18   Height: 5' 6\" (1.676 m)   Weight: 158 lb 14.4 oz (72.1 kg)   SpO2: 98%       Lab Results   Component Value Date/Time    Cholesterol, total 130 05/25/2021 09:08 AM    HDL Cholesterol 50 05/25/2021 09:08 AM    LDL, calculated 62 05/25/2021 09:08 AM    LDL, calculated 40.6 04/28/2021 03:48 AM    VLDL, calculated 18 05/25/2021 09:08 AM    VLDL, calculated 19.4 04/28/2021 03:48 AM    Triglyceride 96 05/25/2021 09:08 AM    CHOL/HDL Ratio 2.1 04/28/2021 03:48 AM     Lab Results   Component Value Date/Time    WBC 8.9 08/18/2021 11:37 AM    HGB 12.5 08/18/2021 11:37 AM    HCT 38.6 08/18/2021 11:37 AM    PLATELET 372 98/02/5666 11:37 AM    MCV 93.0 08/18/2021 11:37 AM     Lab Results   Component Value Date/Time    Sodium 138 08/13/2021 08:00 AM    Potassium 3.5 08/13/2021 08:00 AM    Chloride 101 08/13/2021 08:00 AM    CO2 32 08/13/2021 08:00 AM    Anion gap 5 08/13/2021 08:00 AM    Glucose 127 (H) 08/13/2021 08:00 AM    BUN 23 (H) 08/13/2021 08:00 AM    Creatinine 0.75 08/13/2021 08:00 AM    BUN/Creatinine ratio 31 (H) 08/13/2021 08:00 AM    GFR est AA >60 08/13/2021 08:00 AM    GFR est non-AA >60 08/13/2021 08:00 AM    Calcium 9.5 08/13/2021 08:00 AM    Bilirubin, total 0.3 05/25/2021 09:08 AM    Alk. phosphatase 99 05/25/2021 09:08 AM    Protein, total 5.9 (L) 05/25/2021 09:08 AM    Albumin 3.9 05/25/2021 09:08 AM    Globulin 3.7 04/28/2021 03:48 AM    A-G Ratio 2.0 05/25/2021 09:08 AM    ALT (SGPT) 20 05/25/2021 09:08 AM    AST (SGOT) 25 05/25/2021 09:08 AM       ECG done today sinus rhythm, LBBB     Assessment:       ICD-10-CM ICD-9-CM    1. Coronary artery disease involving native coronary artery of native heart without angina pectoris  I25.10 414.01    2. Essential hypertension  I10 401.9 AMB POC EKG ROUTINE W/ 12 LEADS, INTER & REP   3. Mixed hyperlipidemia  E78.2 272.2         Plan:     1. Coronary artery disease involving native coronary artery of native heart without angina pectoris  Hx of Cypher stent  Lexiscan stress test done 4/2021 without evidence of ischemia  Echo done 5/2012 with preserved LVEF 65%  Continue ASA, statin, BB therapy. Previously on Plavix, however was d/c'd due to small ICH    2. Essential hypertension  BP controlled. Continue anti-hypertensive therapy and low sodium diet    3. Mixed hyperlipidemia  LDL 62 in 5/2021  Continue statin therapy and low fat, low cholesterol diet  Lipids managed by PCP    4. LBBB  New LBBB noted last visit 8/2021  Lexisan stress test done 4/2021 without evidence of ischemia  Echo done 9/2021 with preserved LVEF 60-65%, grade 1 DD, mild MR    5. Counseled on diet and exercise- eventual goal of 30-60 minutes 5-7 times a week as per AHA guidelines. She is doing great with this. F/u with Dr. Yesi Vega in 1 year, sooner PRN    Patient seen and examined by me with the above nurse practitioner. I personally performed all components of the history, physical, and medical decision making and agree with the assessment and plan with minor modifications as noted. Today the patient presents with stable CAD, controlled hyperlipidemia and hypertension. Exercising regularly.     General PE  Gen:  NAD  Mental Status - Alert. General Appearance - Not in acute distress. HEENT:  PERRL, no carotid bruits or JVD  Chest and Lung Exam   Inspection: Accessory muscles - No use of accessory muscles in breathing. Auscultation:   Breath sounds: - Normal.   Cardiovascular   Inspection: Jugular vein - Bilateral - Inspection Normal.   Palpation/Percussion:   Apical Impulse: - Normal.   Auscultation: Rhythm - Regular. Heart Sounds - S1 WNL and S2 WNL. No S3 or S4. Murmurs & Other Heart Sounds: Auscultation of the heart reveals - No Murmurs. Peripheral Vascular   Upper Extremity: Inspection - Bilateral - No Cyanotic nailbeds or Digital clubbing. Lower Extremity:   Palpation: Edema - Bilateral - No edema. Abdomen:   Soft, non-tender, bowel sounds are active. Neuro: A&O times 3, CN and motor grossly WNL    Continue current cardiac care. Encouraged continued healthy diet and exercise. Follow-up in 1 year, sooner as needed.

## 2022-01-25 RX ORDER — ALLOPURINOL 100 MG/1
TABLET ORAL
Qty: 90 TABLET | Refills: 0 | Status: SHIPPED | OUTPATIENT
Start: 2022-01-25 | End: 2022-04-29 | Stop reason: SDUPTHER

## 2022-02-11 NOTE — PROGRESS NOTES
HISTORY OF PRESENT ILLNESS  Ophelia Barragan is a 82 y.o. female. HPI     Last here 8/18/21. Pt is here to f/u on chronic conditions. She c/o arthritic pain in L arm mild uses Tylenol as needed       She has a history of hypertension  BP today is 132/70  BP at home 124/67  Continues on chlorthalidone 25mg daily, metoprolol 50mg BID, And Norvasc 5 mg  no longer on lotrel 5-20mg daily       She has some wch     Had leukocytosis from prednisone that needed to be followed up on had hypokalemia in the hospital as well resolved     Wt today is 158 lbs, up 1 lb x lov      Reviewed labs   Will get labs today    Reviewed echo 9/13/21:  · LV: Estimated LVEF is 60 - 65%. Normal systolic function (ejection fraction normal). Small left ventricle. Moderate septal wall hypertrophy. Mild posterior wall hypertrophy. Wall motion: normal. Mild (grade 1) left ventricular diastolic dysfunction. · MV: Mild mitral valve regurgitation is present. · TV: Mild tricuspid valve regurgitation is present.     Pt follows with Dr. Nia Nicholas (cardio) for CAD  Reviewed note 9/30/21:  1. Coronary artery disease involving native coronary artery of native heart without angina pectoris  Hx of Cypher stent  Lexiscan stress test done 4/2021 without evidence of ischemia  Echo done 5/2012 with preserved LVEF 65%  Continue ASA, statin, BB therapy. Previously on Plavix, however was d/c'd due to small ICH  2. Essential hypertension  BP controlled. Continue anti-hypertensive therapy and low sodium diet  3. Mixed hyperlipidemia  LDL 62 in 5/2021  Continue statin therapy and low fat, low cholesterol diet  Lipids managed by PCP  4. LBBB  New LBBB noted last visit 8/2021  Lexisan stress test done 4/2021 without evidence of ischemia  Echo done 9/2021 with preserved LVEF 60-65%, grade 1 DD, mild MR  5. Counseled on diet and exercise- eventual goal of 30-60 minutes 5-7 times a week as per AHA guidelines. She is doing great with this.   F/u with Dr. Nia Nicholas in 1 year, sooner PRN  Patient seen and examined by me with the above nurse practitioner. I personally performed all components of the history, physical, and medical decision making and agree with the assessment and plan with minor modifications as noted. Today the patient presents with stable CAD, controlled hyperlipidemia and hypertension. Exercising regularly. Continues off plavix  Continues on ASA 81mg      Pt previously followed with Dr. Seb Cedeño (derm) for eczema  Pt will only f/u with this physician prn    Eczema has been good, not needing cream recently certain triggers make it worse       Continues on lipitor 20mg daily for cholesterol  Recall could not tolerate crestor     Continues allopurinol 100mg for gout prevention--no recent flares 2/22  She had been struggling a lot with gout and needing recurrent doses of prednisone so we started allopurinol last visit and she has been doing well     ACP not on file. SDMs are her  Carlos Alberto Flores), son and daughter-in-law.   Provided information in past      PREVENTIVE:    Colonoscopy: declines further  Pap: 9/2010, declines further  Mammogram: declines further  Dexa: declines further  Tdap: 12/2018  Pneumovax: 11/18/2014  [id number]: 4/28/2016  Shingrix: both rounds completed   Flu shot:  12/01/20  Foot exam: 02/15/22  Microalbumin: 8/21  A1c: 2/20 6.2 7/20 6.4 12/20 6.1 4/21 6.2 8/21 6.5  Vainupea 50, 4/21  Lipids: 4/21 LDL 40  Covid: three doses (Lorin Sprinkle)    Patient Active Problem List    Diagnosis Date Noted    Chest pain 04/28/2021    Diabetes mellitus without complication ([facility] [address] 68/54/1285    Left carotid bruit 01/16/2015    Gout 06/11/2013    Mitral valve disorders(424.0) 05/08/2012    Mixed hyperlipidemia 03/07/2012    S/P Stent LAD (LISSA) 03/07/2012    Hypertension 09/16/2009    CAD (coronary artery disease) 09/16/2009     Current Outpatient Medications   Medication Sig Dispense Refill    allopurinoL (ZYLOPRIM) 100 mg tablet TAKE 1 TABLET BY MOUTH DAILY 90 Tablet 0    amLODIPine (NORVASC) 5 mg tablet TAKE 2 TABLETS BY MOUTH DAILY 180 Tablet 1    metoprolol tartrate (LOPRESSOR) 50 mg tablet TAKE 1 TABLET BY MOUTH TWICE DAILY 180 Tablet 3    chlorthalidone (HYGROTON) 25 mg tablet TAKE 1 TABLET BY MOUTH DAILY 90 Tablet 1    atorvastatin (LIPITOR) 20 mg tablet Take 1 Tablet by mouth daily. 90 Tablet 2    potassium chloride (K-DUR, KLOR-CON) 20 mEq tablet Take 1 Tablet by mouth two (2) times a day. Once twice daily for 3 days the once daily 180 Tablet 1    loratadine (Claritin) 10 mg tablet Take 10 mg by mouth daily.  diphenhydrAMINE (Benadryl Allergy) 25 mg tablet Take 25 mg by mouth every six (6) hours as needed.  ASPIRIN LOW DOSE PO Take  by mouth daily.        Past Surgical History:   Procedure Laterality Date    HX CORONARY STENT PLACEMENT      HX HEART CATHETERIZATION      HX ORTHOPAEDIC  10/2013    right ankle    HX ORTHOPAEDIC  2/20/14    INCISION AND DRAINAGE RIGHT ANKLE AND HARDWARE REMOVAL    [place] CARDIAC SURG PROCEDURE UNLIST  2008    cardiac stent      Lab Results   Component Value Date/Time    WBC 8.9 08/18/2021 11:37 AM    HGB 12.5 08/18/2021 11:37 AM    HCT 38.6 08/18/2021 11:37 AM    PLATELET 555 14/09/6960 11:37 AM    MCV 93.0 08/18/2021 11:37 AM     Lab Results   Component Value Date/Time    Cholesterol, total 130 05/25/2021 09:08 AM    HDL Cholesterol 50 05/25/2021 09:08 AM    LDL, calculated 62 05/25/2021 09:08 AM    LDL, calculated 40.6 04/28/2021 03:48 AM    Triglyceride 96 05/25/2021 09:08 AM    CHOL/HDL Ratio 2.1 04/28/2021 03:48 AM     Lab Results   Component Value Date/Time    GFR est non-AA >60 08/13/2021 08:00 AM    GFR est AA >60 08/13/2021 08:00 AM    Creatinine 0.75 08/13/2021 08:00 AM    BUN 23 (H) 08/13/2021 08:00 AM    Sodium 138 08/13/2021 08:00 AM    Potassium 3.5 08/13/2021 08:00 AM    Chloride 101 08/13/2021 08:00 AM    CO2 32 08/13/2021 08:00 AM    Magnesium 2.4 07/29/2021 08:40 AM        Review of Systems   Respiratory: Negative for shortness of breath. Cardiovascular: Negative for chest pain. Physical Exam  Constitutional:       General: She is not in acute distress. Appearance: Normal appearance. She is not ill-appearing, toxic-appearing or diaphoretic. HENT:      Head: Normocephalic and atraumatic. Right Ear: External ear normal.      Left Ear: External ear normal.   Eyes:      General:         Right eye: No discharge. Left eye: No discharge. Conjunctiva/sclera: Conjunctivae normal.      Pupils: Pupils are equal, round, and reactive to light. Cardiovascular:      Rate and Rhythm: Normal rate and regular rhythm. Heart sounds: No murmur heard. No friction rub. No gallop. Pulmonary:      Effort: No respiratory distress. Breath sounds: Normal breath sounds. No wheezing or rales. Chest:      Chest wall: No tenderness. Musculoskeletal:         General: Normal range of motion. Cervical back: Normal range of motion and neck supple. Comments: Bunions and hammertoes bilaterally   Skin:     General: Skin is warm and dry. Neurological:      Mental Status: She is alert and oriented to person, place, and time. Mental status is at baseline. Coordination: Coordination normal.      Gait: Gait normal.      Comments: Sensory exam of the foot is normal, tested with the monofilament. Good pulses, no lesions or ulcers, good peripheral pulses. Psychiatric:         Mood and Affect: Mood normal.         Behavior: Behavior normal.         ASSESSMENT and PLAN    ICD-10-CM ICD-9-CM    1. Primary hypertension  I10 401.9 LIPID PANEL      METABOLIC PANEL, COMPREHENSIVE      HEMOGLOBIN A1C WITH EAG   Controlled on chlorthalidone metoprolol and Norvasc continue   HEMOGLOBIN A1C WITH EAG      LIPID PANEL      METABOLIC PANEL, COMPREHENSIVE   2.  Diabetes mellitus without complication ([facility]  W61.0 250.00 LIPID PANEL      METABOLIC PANEL, COMPREHENSIVE   Controlled with diet check A1c     A1c elevated last check no medication for now but will need to monitor more closely if this starts to climb   HEMOGLOBIN A1C WITH EAG      HM DIABETES FOOT EXAM      HEMOGLOBIN A1C WITH EAG      LIPID PANEL      METABOLIC PANEL, COMPREHENSIVE   3. Coronary artery disease involving native coronary artery of native heart without angina pectoris  I25.10 414.01 LIPID PANEL      METABOLIC PANEL, COMPREHENSIVE      HEMOGLOBIN A1C WITH EAG   Medically managed up-to-date with cardiology   HEMOGLOBIN A1C WITH EAG      LIPID PANEL      METABOLIC PANEL, COMPREHENSIVE   4. Chronic gout without tophus, unspecified cause, unspecified site  M1A. 9XX0 274.02 LIPID PANEL      METABOLIC PANEL, COMPREHENSIVE   No recent flares continues on allopurinol   HEMOGLOBIN A1C WITH EAG      HEMOGLOBIN A1C WITH EAG      LIPID PANEL      METABOLIC PANEL, COMPREHENSIVE   5. Mixed hyperlipidemia  E78.2 272.2 LIPID PANEL      METABOLIC PANEL, COMPREHENSIVE      HEMOGLOBIN A1C WITH EAG   Controlled on Lipitor in the past check lipids adjust dose as needed   HEMOGLOBIN A1C WITH EAG      LIPID PANEL      METABOLIC PANEL, COMPREHENSIVE   6. S/P Stent LAD (LISSA)  Z95.820 V45.89 LIPID PANEL      METABOLIC PANEL, COMPREHENSIVE   Medically managed up-to-date with cardiology had a recent echocardiogram   HEMOGLOBIN A1C WITH EAG      HEMOGLOBIN A1C WITH EAG      LIPID PANEL      METABOLIC PANEL, COMPREHENSIVE        Depression screen reviewed and negative     Scribed by Shilpi Valdez of [facility] Hernandez, as dictated by Dr. Annabella Birmingham. Current diagnosis and concerns discussed with pt at length. Pt understands risks and benefits or current treatment plan and medications, and accepts the treatment and medication with any possible risks. Pt asks appropriate questions, which were answered. Pt was instructed to call with any concerns or problems. I have reviewed the note documented by the scribe.   The services provided are my own.   The documentation is accurate

## 2022-02-15 ENCOUNTER — OFFICE VISIT (OUTPATIENT)
Dept: INTERNAL MEDICINE CLINIC | Age: 87
End: 2022-02-15
Payer: MEDICARE

## 2022-02-15 VITALS
DIASTOLIC BLOOD PRESSURE: 70 MMHG | RESPIRATION RATE: 18 BRPM | HEART RATE: 70 BPM | OXYGEN SATURATION: 98 % | WEIGHT: 158 LBS | TEMPERATURE: 98.6 F | SYSTOLIC BLOOD PRESSURE: 132 MMHG | BODY MASS INDEX: 25.39 KG/M2 | HEIGHT: 66 IN

## 2022-02-15 DIAGNOSIS — E11.9 DIABETES MELLITUS WITHOUT COMPLICATION (HCC): ICD-10-CM

## 2022-02-15 DIAGNOSIS — Z95.820 S/P ANGIOPLASTY WITH STENT: ICD-10-CM

## 2022-02-15 DIAGNOSIS — I25.10 CORONARY ARTERY DISEASE INVOLVING NATIVE CORONARY ARTERY OF NATIVE HEART WITHOUT ANGINA PECTORIS: ICD-10-CM

## 2022-02-15 DIAGNOSIS — E78.2 MIXED HYPERLIPIDEMIA: ICD-10-CM

## 2022-02-15 DIAGNOSIS — I10 PRIMARY HYPERTENSION: Primary | ICD-10-CM

## 2022-02-15 DIAGNOSIS — M1A.9XX0 CHRONIC GOUT WITHOUT TOPHUS, UNSPECIFIED CAUSE, UNSPECIFIED SITE: ICD-10-CM

## 2022-02-15 LAB
ALBUMIN SERPL-MCNC: 4.1 G/DL (ref 3.5–5)
ALBUMIN/GLOB SERPL: 1.2 {RATIO} (ref 1.1–2.2)
ALP SERPL-CCNC: 113 U/L (ref 45–117)
ALT SERPL-CCNC: 31 U/L (ref 12–78)
ANION GAP SERPL CALC-SCNC: 4 MMOL/L (ref 5–15)
AST SERPL-CCNC: 25 U/L (ref 15–37)
BILIRUB SERPL-MCNC: 0.4 MG/DL (ref 0.2–1)
BUN SERPL-MCNC: 30 MG/DL (ref 6–20)
BUN/CREAT SERPL: 33 (ref 12–20)
CALCIUM SERPL-MCNC: 9.9 MG/DL (ref 8.5–10.1)
CHLORIDE SERPL-SCNC: 100 MMOL/L (ref 97–108)
CHOLEST SERPL-MCNC: 157 MG/DL
CO2 SERPL-SCNC: 32 MMOL/L (ref 21–32)
CREAT SERPL-MCNC: 0.92 MG/DL (ref 0.55–1.02)
EST. AVERAGE GLUCOSE BLD GHB EST-MCNC: 146 MG/DL
GLOBULIN SER CALC-MCNC: 3.3 G/DL (ref 2–4)
GLUCOSE SERPL-MCNC: 260 MG/DL (ref 65–100)
HBA1C MFR BLD: 6.7 % (ref 4–5.6)
HDLC SERPL-MCNC: 65 MG/DL
HDLC SERPL: 2.4 {RATIO} (ref 0–5)
LDLC SERPL CALC-MCNC: 60.6 MG/DL (ref 0–100)
POTASSIUM SERPL-SCNC: 3.1 MMOL/L (ref 3.5–5.1)
PROT SERPL-MCNC: 7.4 G/DL (ref 6.4–8.2)
SODIUM SERPL-SCNC: 136 MMOL/L (ref 136–145)
TRIGL SERPL-MCNC: 157 MG/DL (ref ?–150)
VLDLC SERPL CALC-MCNC: 31.4 MG/DL

## 2022-02-15 PROCEDURE — G8400 PT W/DXA NO RESULTS DOC: HCPCS | Performed by: INTERNAL MEDICINE

## 2022-02-15 PROCEDURE — G0463 HOSPITAL OUTPT CLINIC VISIT: HCPCS | Performed by: INTERNAL MEDICINE

## 2022-02-15 PROCEDURE — G8432 DEP SCR NOT DOC, RNG: HCPCS | Performed by: INTERNAL MEDICINE

## 2022-02-15 PROCEDURE — 1090F PRES/ABSN URINE INCON ASSESS: CPT | Performed by: INTERNAL MEDICINE

## 2022-02-15 PROCEDURE — G8754 DIAS BP LESS 90: HCPCS | Performed by: INTERNAL MEDICINE

## 2022-02-15 PROCEDURE — 1101F PT FALLS ASSESS-DOCD LE1/YR: CPT | Performed by: INTERNAL MEDICINE

## 2022-02-15 PROCEDURE — G8427 DOCREV CUR MEDS BY ELIG CLIN: HCPCS | Performed by: INTERNAL MEDICINE

## 2022-02-15 PROCEDURE — G8752 SYS BP LESS 140: HCPCS | Performed by: INTERNAL MEDICINE

## 2022-02-15 PROCEDURE — G8536 NO DOC ELDER MAL SCRN: HCPCS | Performed by: INTERNAL MEDICINE

## 2022-02-15 PROCEDURE — 99214 OFFICE O/P EST MOD 30 MIN: CPT | Performed by: INTERNAL MEDICINE

## 2022-02-15 PROCEDURE — G8419 CALC BMI OUT NRM PARAM NOF/U: HCPCS | Performed by: INTERNAL MEDICINE

## 2022-02-15 NOTE — PROGRESS NOTES
Identified pt with two pt identifiers(name and ). Reviewed record in preparation for visit and have obtained necessary documentation. Chief Complaint   Patient presents with    Hypertension     follow up 6 month        Vitals:    02/15/22 0811   BP: 132/70   Pulse: 70   Resp: 18   Temp: 98.6 °F (37 °C)   TempSrc: Temporal   SpO2: 98%   Weight: 158 lb (71.7 kg)   Height: 5' 6\" (1.676 m)   PainSc:   0 - No pain       Health Maintenance Due   Topic    Eye Exam Retinal or Dilated     Flu Vaccine (1)    COVID-19 Vaccine (3 - Booster for Pfizer series)    Foot Exam Q1     A1C test (Diabetic or Prediabetic)        Coordination of Care Questionnaire:  :   1) Have you been to an emergency room, urgent care, or hospitalized since your last visit? If yes, where when, and reason for visit? no       2. Have seen or consulted any other health care provider since your last visit? If yes, where when, and reason for visit? NO      Patient is accompanied by self I have received verbal consent from Elvis Riggins to discuss any/all medical information while they are present in the room. An electronic signature was used to authenticate this note.   -- Jose Herring LPN

## 2022-02-16 NOTE — PROGRESS NOTES
Please call patient back about results  Blood sugars have climbed we need to start treatment lets start Metformin 500 mg 2 of these per day for her diabetes  Needs to start Klor-Con 20 M EQ daily  She was incorrectly scheduled with Dr. Kami Small for August cancel this--she needs to move up her appointment with me for 3 months from last office visit we will repeat labs done

## 2022-02-17 DIAGNOSIS — E11.9 DIABETES MELLITUS WITHOUT COMPLICATION (HCC): Primary | ICD-10-CM

## 2022-02-17 DIAGNOSIS — E87.6 HYPOKALEMIA: ICD-10-CM

## 2022-02-17 RX ORDER — METFORMIN HYDROCHLORIDE 500 MG/1
500 TABLET ORAL 2 TIMES DAILY WITH MEALS
Qty: 180 TABLET | Refills: 0 | Status: SHIPPED | OUTPATIENT
Start: 2022-02-17 | End: 2022-05-25 | Stop reason: SDUPTHER

## 2022-02-17 RX ORDER — POTASSIUM CHLORIDE 20 MEQ/1
20 TABLET, EXTENDED RELEASE ORAL DAILY
Qty: 90 TABLET | Refills: 1 | Status: SHIPPED | OUTPATIENT
Start: 2022-02-17 | End: 2022-05-25 | Stop reason: SDUPTHER

## 2022-02-17 NOTE — PROGRESS NOTES
Called, spoke with Pt. Two pt identifiers confirmed. Pt informed of lab results and recommendations per Dr. Franny Sosa. Pt stated to sent both Rx to her pharmacy to start taking. Pt appt rescheduled for 05/25/22. Pt verbalized understanding of information discussed w/ no further questions at this time.

## 2022-02-17 NOTE — PROGRESS NOTES
PCP: Miller Marquez MD    Last appt: 2/15/2022  Future Appointments   Date Time Provider Guido Goldstein   5/25/2022 11:45 AM Miller Marquez MD [facility] BS AMB   10/6/2022 11:15 AM Delaney Ron MD [facility] BS AMB       Requested Prescriptions     Pending Prescriptions Disp Refills    potassium chloride (K-DUR, KLOR-CON M20) 20 mEq tablet 90 Tablet 1     Sig: Take 1 Tablet by mouth daily.  metFORMIN (GLUCOPHAGE) 500 mg tablet 180 Tablet 0     Sig: Take 1 Tablet by mouth two (2) times daily (with meals).

## 2022-03-19 PROBLEM — R07.9 CHEST PAIN: Status: ACTIVE | Noted: 2021-04-28

## 2022-04-14 RX ORDER — CHLORTHALIDONE 25 MG/1
TABLET ORAL
Qty: 90 TABLET | Refills: 1 | Status: SHIPPED | OUTPATIENT
Start: 2022-04-14 | End: 2022-10-05 | Stop reason: SDUPTHER

## 2022-04-29 RX ORDER — ALLOPURINOL 100 MG/1
TABLET ORAL
Qty: 90 TABLET | Refills: 0 | Status: SHIPPED | OUTPATIENT
Start: 2022-04-29 | End: 2022-07-26

## 2022-05-20 DIAGNOSIS — E11.9 DIABETES MELLITUS WITHOUT COMPLICATION (HCC): ICD-10-CM

## 2022-05-24 NOTE — PROGRESS NOTES
HISTORY OF PRESENT ILLNESS  José Luis Magdaleno is a 82 y.o. female. HPI     Last here 2/15/22. Pt is here to f/u on chronic conditions.     She has a history of hypertension  BP today is 143/58  BP at home 116/68 127/76  Continues on chlorthalidone 25mg daily, metoprolol 50mg BID, And Norvasc 5 mg  no longer on lotrel 5-20mg daily     She has some wch          Wt today is 153 lbs, down 5 lbs x lov       Reviewed labs   Will get labs today      Patient is diabetic  A1c has been worsening so we started metformin over the phone since last office visit she is tolerating well   127 fasting   Taking metformin 500mg BID     Recall echo 9/13/21:  · LV: Estimated LVEF is 60 - 65%. Normal systolic function (ejection fraction normal). Small left ventricle. Moderate septal wall hypertrophy. Mild posterior wall hypertrophy. Wall motion: normal. Mild (grade 1) left ventricular diastolic dysfunction. · MV: Mild mitral valve regurgitation is present. · TV: Mild tricuspid valve regurgitation is present.     Pt follows with Dr. Arnold Miller (cardio) for CAD  Last visit 9/30/21  Scheduled 10/4/22  Has a history of stent placement     Continues off plavix  Continues on ASA 81mg      Pt previously followed with Dr. Trudi Longoria (derm) for eczema  Pt will only f/u with this physician prn    Eczema has been good, not needing cream recently certain triggers make it worse       Continues on lipitor 20mg daily for cholesterol  Recall could not tolerate crestor    Started Klor-Con 21 M EQ since last office visit due to low potassium     Continues allopurinol 100mg for gout prevention--no recent flares 5/22  She had been struggling a lot with gout and needing recurrent doses of prednisone so we started allopurinol last visit and she has been doing well     ACP not on file. SDMs are her  Yoni Abbott, son and daughter-in-law.   Provided information in past      PREVENTIVE:    Colonoscopy: declines further  Pap: 9/2010, declines further  Mammogram: declines further  Dexa: declines further  Tdap: 12/2018  Pneumovax: 11/18/2014  [id number]: 4/28/2016  Shingrix: both rounds completed   Flu shot:  fall 2021  Foot exam: 02/15/22  Microalbumin: 8/21  A1c: 2/20 6.2 7/20 6.4 12/20 6.1 4/21 6.2 8/21 6.5 2/22 6.7  Vainupea 50, 6/21 this is due  Lipids: 2/22 LDL 60  Covid: three doses (pfizer)    Patient Active Problem List    Diagnosis Date Noted    Diabetes mellitus without complication ([facility] [address] 39/45/5441    Left carotid bruit 01/16/2015    Gout 06/11/2013    Mitral valve disorders(424.0) 05/08/2012    Mixed hyperlipidemia 03/07/2012    S/P Stent LAD (LISSA) 03/07/2012    Hypertension 09/16/2009    CAD (coronary artery disease) 09/16/2009     Current Outpatient Medications   Medication Sig Dispense Refill    potassium chloride (K-DUR, KLOR-CON M20) 20 mEq tablet Take 1 Tablet by mouth daily. 90 Tablet 1    atorvastatin (LIPITOR) 20 mg tablet Take 1 Tablet by mouth daily. 90 Tablet 2    metFORMIN (GLUCOPHAGE) 500 mg tablet Take 1 Tablet by mouth two (2) times daily (with meals). 180 Tablet 0    allopurinoL (ZYLOPRIM) 100 mg tablet TAKE 1 TABLET BY MOUTH DAILY 90 Tablet 0    chlorthalidone (HYGROTON) 25 mg tablet TAKE 1 TABLET BY MOUTH DAILY 90 Tablet 1    amLODIPine (NORVASC) 5 mg tablet TAKE 2 TABLETS BY MOUTH DAILY 180 Tablet 1    metoprolol tartrate (LOPRESSOR) 50 mg tablet TAKE 1 TABLET BY MOUTH TWICE DAILY 180 Tablet 3    loratadine (Claritin) 10 mg tablet Take 10 mg by mouth daily.  diphenhydrAMINE (Benadryl Allergy) 25 mg tablet Take 25 mg by mouth every six (6) hours as needed.  ASPIRIN LOW DOSE PO Take  by mouth daily.        Past Surgical History:   Procedure Laterality Date    HX CORONARY STENT PLACEMENT      HX HEART CATHETERIZATION      HX ORTHOPAEDIC  10/2013    right ankle    HX ORTHOPAEDIC  2/20/14    INCISION AND DRAINAGE RIGHT ANKLE AND HARDWARE REMOVAL    [place] CARDIAC SURG PROCEDURE UNLIST  2008    cardiac stent      Lab Results   Component Value Date/Time    WBC 8.9 08/18/2021 11:37 AM    HGB 12.5 08/18/2021 11:37 AM    HCT 38.6 08/18/2021 11:37 AM    PLATELET 773 71/57/9952 11:37 AM    MCV 93.0 08/18/2021 11:37 AM     Lab Results   Component Value Date/Time    Cholesterol, total 157 02/15/2022 09:08 AM    HDL Cholesterol 65 02/15/2022 09:08 AM    LDL, calculated 60.6 02/15/2022 09:08 AM    Triglyceride 157 (H) 02/15/2022 09:08 AM    CHOL/HDL Ratio 2.4 02/15/2022 09:08 AM     Lab Results   Component Value Date/Time    GFR est non-AA 58 (L) 02/15/2022 09:08 AM    GFR est AA >60 02/15/2022 09:08 AM    Creatinine 0.92 02/15/2022 09:08 AM    BUN 30 (H) 02/15/2022 09:08 AM    Sodium 136 02/15/2022 09:08 AM    Potassium 3.1 (L) 02/15/2022 09:08 AM    Chloride 100 02/15/2022 09:08 AM    CO2 32 02/15/2022 09:08 AM    Magnesium 2.4 07/29/2021 08:40 AM        Review of Systems   Respiratory: Negative for shortness of breath. Cardiovascular: Negative for chest pain. Physical Exam  Constitutional:       General: She is not in acute distress. Appearance: Normal appearance. She is not ill-appearing, toxic-appearing or diaphoretic. HENT:      Head: Normocephalic and atraumatic. Right Ear: External ear normal.      Left Ear: External ear normal.   Eyes:      General:         Right eye: No discharge. Left eye: No discharge. Conjunctiva/sclera: Conjunctivae normal.      Pupils: Pupils are equal, round, and reactive to light. Cardiovascular:      Rate and Rhythm: Normal rate and regular rhythm. Heart sounds: No murmur heard. No friction rub. No gallop. Pulmonary:      Effort: No respiratory distress. Breath sounds: Normal breath sounds. No wheezing or rales. Chest:      Chest wall: No tenderness. Musculoskeletal:         General: Normal range of motion. Cervical back: Normal range of motion and neck supple. Right lower leg: No edema.       Left lower leg: No edema. Skin:     General: Skin is warm and dry. Neurological:      Mental Status: She is alert and oriented to person, place, and time. Mental status is at baseline. Coordination: Coordination normal.      Gait: Gait normal.   Psychiatric:         Mood and Affect: Mood normal.         Behavior: Behavior normal.         ASSESSMENT and PLAN    ICD-10-CM ICD-9-CM    1. Coronary artery disease involving native coronary artery of native heart without angina pectoris  D24.19 138.39 METABOLIC PANEL, BASIC      MICROALBUMIN, UR, RAND W/ MICROALB/CREAT RATIO   Medically managed continues on Plavix and aspirin we will see cardiology in the fall   HEMOGLOBIN A1C WITH EAG      TSH 3RD GENERATION   2. Hypokalemia  E87.6 276.8 potassium chloride (K-DUR, KLOR-CON M20) 20 mEq tablet   Potassium low on last labs I started Klor-Con    21 M EQ's daily    Repeat metabolic panel and adjust dosing as needed   METABOLIC PANEL, BASIC      MICROALBUMIN, UR, RAND W/ MICROALB/CREAT RATIO      HEMOGLOBIN A1C WITH EAG      TSH 3RD GENERATION   3. Diabetes mellitus without complication ([facility])  O93.4 250.00 metFORMIN (GLUCOPHAGE) 500 mg tablet      METABOLIC PANEL, BASIC   Now on metformin 500 mg twice daily check A1c adjust medication as needed   MICROALBUMIN, UR, RAND W/ MICROALB/CREAT RATIO      HEMOGLOBIN A1C WITH EAG      TSH 3RD GENERATION   4. Chronic gout without tophus, unspecified cause, unspecified site  M1A. 9XX0 870.34 METABOLIC PANEL, BASIC      MICROALBUMIN, UR, RAND W/ MICROALB/CREAT RATIO   Controlled with allopurinol uric acid level at goal   HEMOGLOBIN A1C WITH EAG      TSH 3RD GENERATION   5. Mixed hyperlipidemia  W25.5 415.2 METABOLIC PANEL, BASIC      MICROALBUMIN, UR, RAND W/ MICROALB/CREAT RATIO   Controlled Lipitor   HEMOGLOBIN A1C WITH EAG      TSH 3RD GENERATION   6.  Essential hypertension  H82 765.0 METABOLIC PANEL, BASIC      MICROALBUMIN, UR, RAND W/ MICROALB/CREAT RATIO   Initial blood pressure elevated repeat improved continue  chlorthalidone 25mg daily, metoprolol 50mg BID, And Norvasc 5 mg   HEMOGLOBIN A1C WITH EAG      TSH 3RD GENERATION        Depression screen reviewed and negative     Scribed by Diego Beckford of [address] [place] [address] [address], as dictated by Dr. Kayleigh Tyler. Current diagnosis and concerns discussed with pt at length. Pt understands risks and benefits or current treatment plan and medications, and accepts the treatment and medication with any possible risks. Pt asks appropriate questions, which were answered. Pt was instructed to call with any concerns or problems. I have reviewed the note documented by the scribe. The services provided are my own.   The documentation is accurate

## 2022-05-25 ENCOUNTER — OFFICE VISIT (OUTPATIENT)
Dept: INTERNAL MEDICINE CLINIC | Age: 87
End: 2022-05-25
Payer: MEDICARE

## 2022-05-25 VITALS
HEIGHT: 66 IN | WEIGHT: 153 LBS | DIASTOLIC BLOOD PRESSURE: 64 MMHG | RESPIRATION RATE: 18 BRPM | SYSTOLIC BLOOD PRESSURE: 126 MMHG | HEART RATE: 70 BPM | TEMPERATURE: 97.5 F | BODY MASS INDEX: 24.59 KG/M2 | OXYGEN SATURATION: 98 %

## 2022-05-25 DIAGNOSIS — M1A.9XX0 CHRONIC GOUT WITHOUT TOPHUS, UNSPECIFIED CAUSE, UNSPECIFIED SITE: ICD-10-CM

## 2022-05-25 DIAGNOSIS — E11.9 DIABETES MELLITUS WITHOUT COMPLICATION (HCC): ICD-10-CM

## 2022-05-25 DIAGNOSIS — E78.2 MIXED HYPERLIPIDEMIA: ICD-10-CM

## 2022-05-25 DIAGNOSIS — I25.10 CORONARY ARTERY DISEASE INVOLVING NATIVE CORONARY ARTERY OF NATIVE HEART WITHOUT ANGINA PECTORIS: Primary | ICD-10-CM

## 2022-05-25 DIAGNOSIS — E87.6 HYPOKALEMIA: ICD-10-CM

## 2022-05-25 DIAGNOSIS — I10 ESSENTIAL HYPERTENSION: ICD-10-CM

## 2022-05-25 PROBLEM — R07.9 CHEST PAIN: Status: RESOLVED | Noted: 2021-04-28 | Resolved: 2022-05-25

## 2022-05-25 PROCEDURE — G8752 SYS BP LESS 140: HCPCS | Performed by: INTERNAL MEDICINE

## 2022-05-25 PROCEDURE — 1090F PRES/ABSN URINE INCON ASSESS: CPT | Performed by: INTERNAL MEDICINE

## 2022-05-25 PROCEDURE — G8510 SCR DEP NEG, NO PLAN REQD: HCPCS | Performed by: INTERNAL MEDICINE

## 2022-05-25 PROCEDURE — G8536 NO DOC ELDER MAL SCRN: HCPCS | Performed by: INTERNAL MEDICINE

## 2022-05-25 PROCEDURE — G8400 PT W/DXA NO RESULTS DOC: HCPCS | Performed by: INTERNAL MEDICINE

## 2022-05-25 PROCEDURE — G8420 CALC BMI NORM PARAMETERS: HCPCS | Performed by: INTERNAL MEDICINE

## 2022-05-25 PROCEDURE — G8427 DOCREV CUR MEDS BY ELIG CLIN: HCPCS | Performed by: INTERNAL MEDICINE

## 2022-05-25 PROCEDURE — 99214 OFFICE O/P EST MOD 30 MIN: CPT | Performed by: INTERNAL MEDICINE

## 2022-05-25 PROCEDURE — G8754 DIAS BP LESS 90: HCPCS | Performed by: INTERNAL MEDICINE

## 2022-05-25 PROCEDURE — G0463 HOSPITAL OUTPT CLINIC VISIT: HCPCS | Performed by: INTERNAL MEDICINE

## 2022-05-25 PROCEDURE — 1101F PT FALLS ASSESS-DOCD LE1/YR: CPT | Performed by: INTERNAL MEDICINE

## 2022-05-25 RX ORDER — POTASSIUM CHLORIDE 20 MEQ/1
20 TABLET, EXTENDED RELEASE ORAL DAILY
Qty: 90 TABLET | Refills: 1 | Status: SHIPPED | OUTPATIENT
Start: 2022-05-25

## 2022-05-25 RX ORDER — ATORVASTATIN CALCIUM 20 MG/1
20 TABLET, FILM COATED ORAL DAILY
Qty: 90 TABLET | Refills: 2 | Status: SHIPPED | OUTPATIENT
Start: 2022-05-25

## 2022-05-25 RX ORDER — METFORMIN HYDROCHLORIDE 500 MG/1
500 TABLET ORAL 2 TIMES DAILY WITH MEALS
Qty: 180 TABLET | Refills: 0 | Status: SHIPPED | OUTPATIENT
Start: 2022-05-25

## 2022-05-25 NOTE — PROGRESS NOTES
1. \"Have you been to the ER, urgent care clinic since your last visit? Hospitalized since your last visit? \" No    2. \"Have you seen or consulted any other health care providers outside of the [address] [place] [address] since your last visit? \" No     3. For patients aged 39-70: Has the patient had a colonoscopy / FIT/ Cologuard? Yes - no Care Gap present      If the patient is female:    4. For patients aged 41-77: Has the patient had a mammogram within the past 2 years? NA - based on age or sex      11. For patients aged 21-65: Has the patient had a pap smear?  NA - based on age or sex

## 2022-05-26 LAB
ANION GAP SERPL CALC-SCNC: 5 MMOL/L (ref 5–15)
BUN SERPL-MCNC: 27 MG/DL (ref 6–20)
BUN/CREAT SERPL: 32 (ref 12–20)
CALCIUM SERPL-MCNC: 9.5 MG/DL (ref 8.5–10.1)
CHLORIDE SERPL-SCNC: 99 MMOL/L (ref 97–108)
CO2 SERPL-SCNC: 33 MMOL/L (ref 21–32)
CREAT SERPL-MCNC: 0.84 MG/DL (ref 0.55–1.02)
CREAT UR-MCNC: 117 MG/DL
EST. AVERAGE GLUCOSE BLD GHB EST-MCNC: 128 MG/DL
GLUCOSE SERPL-MCNC: 178 MG/DL (ref 65–100)
HBA1C MFR BLD: 6.1 % (ref 4–5.6)
MICROALBUMIN UR-MCNC: 1.11 MG/DL
MICROALBUMIN/CREAT UR-RTO: 9 MG/G (ref 0–30)
POTASSIUM SERPL-SCNC: 3.6 MMOL/L (ref 3.5–5.1)
SODIUM SERPL-SCNC: 137 MMOL/L (ref 136–145)
TSH SERPL DL<=0.05 MIU/L-ACNC: 1.18 UIU/ML (ref 0.36–3.74)

## 2022-05-26 RX ORDER — METFORMIN HYDROCHLORIDE 500 MG/1
TABLET ORAL
Qty: 180 TABLET | Refills: 0 | Status: SHIPPED | OUTPATIENT
Start: 2022-05-26 | End: 2022-09-21 | Stop reason: SDUPTHER

## 2022-05-26 RX ORDER — ATORVASTATIN CALCIUM 20 MG/1
20 TABLET, FILM COATED ORAL DAILY
Qty: 90 TABLET | Refills: 2 | Status: SHIPPED | OUTPATIENT
Start: 2022-05-26

## 2022-07-26 RX ORDER — ALLOPURINOL 100 MG/1
TABLET ORAL
Qty: 90 TABLET | Refills: 0 | Status: SHIPPED | OUTPATIENT
Start: 2022-07-26 | End: 2022-10-19

## 2022-09-06 RX ORDER — METOPROLOL TARTRATE 50 MG/1
TABLET ORAL
Qty: 180 TABLET | Refills: 0 | Status: SHIPPED | OUTPATIENT
Start: 2022-09-06

## 2022-09-06 NOTE — TELEPHONE ENCOUNTER
Refill Request Received for the Following Medication     Requested Prescriptions     Pending Prescriptions Disp Refills    metoprolol tartrate (LOPRESSOR) 50 mg tablet [Pharmacy Med Name: METOPROLOL TARTRATE 50MG TABLETS] 180 Tablet 3     Sig: TAKE 1 TABLET BY MOUTH TWICE DAILY       Last Prescribed:08-    Last Appointment With Me:  09-    Future Appointments:  Future Appointments   Date Time Provider Guido Goldstein   9/21/2022 10:15 AM Stan Churchill MD [facility] BS AMB   10/4/2022  9:20 AM Anastacia Anguiano MD [facility] BS AMB

## 2022-09-19 NOTE — PROGRESS NOTES
HISTORY OF PRESENT ILLNESS  Viv Parker is a 82 y.o. female. HPI  Last here 5/25/22. Pt is here to f/u on chronic conditions. She has a history of hypertension  BP today is 147/70, will repeat 126/66  BP at home 124/62   Continues on chlorthalidone 25mg daily, metoprolol 50mg BID, and Norvasc 5 mg  no longer on lotrel 5-20mg daily     She has some wch  She took her BP meds today     Patient is diabetic  No home BS readings for review  Taking metformin 500mg BID  She has been out for 1 day we will refill today    Wt today is 150 lbs, down 3 lbs since lov    Reviewed labs   Will get labs today     Recall echo 9/13/21:  LV: Estimated LVEF is 60 - 65%. Normal systolic function (ejection fraction normal). Small left ventricle. Moderate septal wall hypertrophy. Mild posterior wall hypertrophy. Wall motion: normal. Mild (grade 1) left ventricular diastolic dysfunction. MV: Mild mitral valve regurgitation is present. TV: Mild tricuspid valve regurgitation is present. Pt follows with Dr. Aric Carcamo (cardio) for CAD  Last visit 9/30/21  Scheduled 10/4/22  Has a history of stent placement     Continues on ASA 81mg  Continues off plavix    Pt previously followed with Dr. Negrita Chang (derm) for eczema  Pt will only f/u with this physician prn    Eczema has been good, not needing cream recently certain triggers make it worse       Continues on lipitor 20mg daily for cholesterol  Recall could not tolerate crestor     Taking Klor-Con 21 M EQ      Continues allopurinol 100mg for gout prevention--no recent flares 9/22    Pt lives w/ her   Some safety equipment at home     Pt is functionally independent   Does own laundry  Does own driving  Does own bills and meds     No memory concerns   Knows the month, date, year, location   Can recall 3/3 objects      ACP not on file. SDMs are her  Hilaria Gaviria), son and daughter-in-law.   Provided information in past      PREVENTIVE:    Colonoscopy: declines further  Pap: 9/2010, declines further  Mammogram: declines further  Dexa: declines further  Tdap: 12/2018  Pneumovax: 11/18/2014  [id number]: 4/28/2016  Shingrix: both rounds completed   Flu shot: 9/21/22  Foot exam: 02/15/22  Microalbumin: 5/22  A1c:  4/21 6.2 8/21 6.5 2/22 6.7 5/22 6.1  Eye exam: [facility], 8/22  Lipids: 2/22 LDL 60  Covid: three doses (pfizer)      Patient Active Problem List    Diagnosis Date Noted    Diabetes mellitus without complication ([facility] [address] 42/55/9475    Left carotid bruit 01/16/2015    Gout 06/11/2013    Mitral valve disorders(424.0) 05/08/2012    Mixed hyperlipidemia 03/07/2012    S/P Stent LAD (LISSA) 03/07/2012    Hypertension 09/16/2009    CAD (coronary artery disease) 09/16/2009     Current Outpatient Medications   Medication Sig Dispense Refill    metoprolol tartrate (LOPRESSOR) 50 mg tablet TAKE 1 TABLET BY MOUTH TWICE DAILY 180 Tablet 0    allopurinoL (ZYLOPRIM) 100 mg tablet TAKE 1 TABLET BY MOUTH DAILY 90 Tablet 0    amLODIPine (NORVASC) 5 mg tablet TAKE 2 TABLETS BY MOUTH DAILY 180 Tablet 1    atorvastatin (LIPITOR) 20 mg tablet TAKE 1 TABLET BY MOUTH DAILY 90 Tablet 2    metFORMIN (GLUCOPHAGE) 500 mg tablet TAKE 1 TABLET BY MOUTH TWICE DAILY WITH MEALS 180 Tablet 0    potassium chloride (K-DUR, KLOR-CON M20) 20 mEq tablet Take 1 Tablet by mouth daily. 90 Tablet 1    atorvastatin (LIPITOR) 20 mg tablet Take 1 Tablet by mouth daily. 90 Tablet 2    metFORMIN (GLUCOPHAGE) 500 mg tablet Take 1 Tablet by mouth two (2) times daily (with meals). 180 Tablet 0    chlorthalidone (HYGROTON) 25 mg tablet TAKE 1 TABLET BY MOUTH DAILY 90 Tablet 1    loratadine (Claritin) 10 mg tablet Take 10 mg by mouth daily. diphenhydrAMINE (Benadryl Allergy) 25 mg tablet Take 25 mg by mouth every six (6) hours as needed. ASPIRIN LOW DOSE PO Take  by mouth daily.        Past Surgical History:   Procedure Laterality Date    HX CORONARY STENT PLACEMENT      HX HEART CATHETERIZATION HX ORTHOPAEDIC  10/2013    right ankle    HX ORTHOPAEDIC  2/20/14    INCISION AND DRAINAGE RIGHT ANKLE AND HARDWARE REMOVAL    [place] CARDIAC SURG PROCEDURE UNLIST  2008    cardiac stent      Lab Results   Component Value Date/Time    WBC 8.9 08/18/2021 11:37 AM    HGB 12.5 08/18/2021 11:37 AM    HCT 38.6 08/18/2021 11:37 AM    PLATELET 472 75/20/5023 11:37 AM    MCV 93.0 08/18/2021 11:37 AM     Lab Results   Component Value Date/Time    Cholesterol, total 157 02/15/2022 09:08 AM    HDL Cholesterol 65 02/15/2022 09:08 AM    LDL, calculated 60.6 02/15/2022 09:08 AM    Triglyceride 157 (H) 02/15/2022 09:08 AM    CHOL/HDL Ratio 2.4 02/15/2022 09:08 AM     Lab Results   Component Value Date/Time    GFR est non-AA >60 05/25/2022 12:20 PM    GFR est AA >60 05/25/2022 12:20 PM    Creatinine 0.84 05/25/2022 12:20 PM    BUN 27 (H) 05/25/2022 12:20 PM    Sodium 137 05/25/2022 12:20 PM    Potassium 3.6 05/25/2022 12:20 PM    Chloride 99 05/25/2022 12:20 PM    CO2 33 (H) 05/25/2022 12:20 PM    Magnesium 2.4 07/29/2021 08:40 AM        Review of Systems   Respiratory:  Negative for shortness of breath. Cardiovascular:  Negative for chest pain. Physical Exam  Constitutional:       General: She is not in acute distress. Appearance: She is not ill-appearing, toxic-appearing or diaphoretic. HENT:      Head: Normocephalic and atraumatic. Right Ear: External ear normal.      Left Ear: External ear normal.   Eyes:      General:         Right eye: No discharge. Left eye: No discharge. Conjunctiva/sclera: Conjunctivae normal.      Pupils: Pupils are equal, round, and reactive to light. Neck:      Vascular: No carotid bruit. Cardiovascular:      Rate and Rhythm: Normal rate and regular rhythm. Heart sounds: No murmur heard. No friction rub. No gallop. Pulmonary:      Effort: No respiratory distress. Breath sounds: Normal breath sounds. No wheezing or rales.    Chest:      Chest wall: No tenderness. Musculoskeletal:         General: Normal range of motion. Cervical back: Normal.      Right lower leg: No edema. Left lower leg: No edema. Skin:     General: Skin is warm and dry. Neurological:      Mental Status: She is oriented to person, place, and time. Mental status is at baseline. Coordination: Coordination normal.      Gait: Gait normal.   Psychiatric:         Mood and Affect: Mood normal.         Behavior: Behavior normal.       ASSESSMENT and PLAN    ICD-10-CM ICD-9-CM    1. Medicare annual wellness visit, subsequent  Z00.00 V70.0 LIPID PANEL      METABOLIC PANEL, COMPREHENSIVE      HEMOGLOBIN A1C WITH EAG      CBC W/O DIFF      URIC ACID      2. Diabetes mellitus without complication (HCC)  V03.4 250.00 metFORMIN (GLUCOPHAGE) 500 mg tablet      LIPID PANEL      METABOLIC PANEL, COMPREHENSIVE   Diabetes well controlled on metformin 500 mg twice daily    Will refill medication today    Check A1c today    Had eye exam will get notes for review       HEMOGLOBIN A1C WITH EAG      CBC W/O DIFF      URIC ACID      3. Primary hypertension  I10 401.9 LIPID PANEL      METABOLIC PANEL, COMPREHENSIVE   Continues on chlorthalidone 25mg daily, metoprolol 50mg BID, and Norvasc 5 mg    Initial blood pressure elevated repeat at goal continue no change to dose check metabolic panel for K creatinine sodium levels   HEMOGLOBIN A1C WITH EAG      CBC W/O DIFF      URIC ACID      4. Coronary artery disease involving native coronary artery of native heart without angina pectoris  I25.10 414.01 LIPID PANEL      METABOLIC PANEL, COMPREHENSIVE   Medically managed no active signs or symptoms of disease continues on aspirin daily has follow-up with cardiology pending has history of stent   HEMOGLOBIN A1C WITH EAG      CBC W/O DIFF      URIC ACID      5.  Mixed hyperlipidemia  E78.2 272.2 LIPID PANEL      METABOLIC PANEL, COMPREHENSIVE      HEMOGLOBIN A1C WITH EAG   Controlled on Lipitor continue check lipids adjust dose as needed   CBC W/O DIFF      URIC ACID      6. Chronic gout without tophus, unspecified cause, unspecified site  M1A. 9XX0 274.02 LIPID PANEL      METABOLIC PANEL, COMPREHENSIVE   No recent flares continues on allopurinol   HEMOGLOBIN A1C WITH EAG      CBC W/O DIFF      URIC ACID      7. Needs flu shot  Z23 V04.81 INFLUENZA, FLUAD, (AGE 65 Y+), IM, PF, 0.5 ML      LIPID PANEL      METABOLIC PANEL, COMPREHENSIVE      HEMOGLOBIN A1C WITH EAG      CBC W/O DIFF      URIC ACID      8. Hypokalemia  E87.6 276.8 LIPID PANEL      METABOLIC PANEL, COMPREHENSIVE      HEMOGLOBIN A1C WITH EAG      CBC W/O DIFF   On Klor-Con 20 M EQ's last potassium level at goal we will repeat levels adjust dose as needed   URIC ACID        Depression screen reviewed and negative. Scribed by Gilford Jean Paul, as dictated by Dr. Martha Roberts. Current diagnosis and concerns discussed with pt at length. Pt understands risks and benefits or current treatment plan and medications, and accepts the treatment and medication with any possible risks. Pt asks appropriate questions, which were answered. Pt was instructed to call with any concerns or problems. I have reviewed the note documented by the scribe. The services provided are my own. The documentation is accurate.

## 2022-09-20 NOTE — PATIENT INSTRUCTIONS
Medicare Wellness Visit, Female     The best way to live healthy is to have a lifestyle where you eat a well-balanced diet, exercise regularly, limit alcohol use, and quit all forms of tobacco/nicotine, if applicable. Regular preventive services are another way to keep healthy. Preventive services (vaccines, screening tests, monitoring & exams) can help personalize your care plan, which helps you manage your own care. Screening tests can find health problems at the earliest stages, when they are easiest to treat. [facility] follows the current, evidence-based guidelines published by the [facility] ([facility]) when recommending preventive services for our patients. Because we follow these guidelines, sometimes recommendations change over time as research supports it. (For example, mammograms used to be recommended annually. Even though Medicare will still pay for an annual mammogram, the newer guidelines recommend a mammogram every two years for women of average risk). Of course, you and your doctor may decide to screen more often for some diseases, based on your risk and your co-morbidities (chronic disease you are already diagnosed with). Preventive services for you include:  - Medicare offers their members a free annual wellness visit, which is time for you and your primary care provider to discuss and plan for your preventive service needs. Take advantage of this benefit every year!  -All adults over the age of 72 should receive the recommended pneumonia vaccines. Current USPSTF guidelines recommend a series of two vaccines for the best pneumonia protection.   -All adults should have a flu vaccine yearly and a tetanus vaccine every 10 years.   -All adults age 48 and older should receive the shingles vaccines (series of two vaccines).       -All adults age 38-68 who are overweight should have a diabetes screening test once every three years.   -All adults born between [birth year] and 1963 should be screened once for Hepatitis C.  -Other screening tests and preventive services for persons with diabetes include: an eye exam to screen for diabetic retinopathy, a kidney function test, a foot exam, and stricter control over your cholesterol.   -Cardiovascular screening for adults with routine risk involves an electrocardiogram (ECG) at intervals determined by your doctor.   -Colorectal cancer screenings should be done for adults age 54-65 with no increased risk factors for colorectal cancer. There are a number of acceptable methods of screening for this type of cancer. Each test has its own benefits and drawbacks. Discuss with your doctor what is most appropriate for you during your annual wellness visit. The different tests include: colonoscopy (considered the best screening method), a fecal occult blood test, a fecal DNA test, and sigmoidoscopy.    -A bone mass density test is recommended when a woman turns 65 to screen for osteoporosis. This test is only recommended one time, as a screening. Some providers will use this same test as a disease monitoring tool if you already have osteoporosis. -Breast cancer screenings are recommended every other year for women of normal risk, age 54-69.  -Cervical cancer screenings for women over age 72 are only recommended with certain risk factors.      Here is a list of your current Health Maintenance items (your personalized list of preventive services) with a due date:  Health Maintenance Due   Topic Date Due    Eye Exam  09/10/2015    COVID-19 Vaccine (4 - Booster for [place] series) 03/15/2022    Annual Well Visit  08/19/2022    Yearly Flu Vaccine (1) 09/01/2022

## 2022-09-20 NOTE — PROGRESS NOTES
This is the Subsequent Medicare Annual Wellness Exam, performed 12 months or more after the Initial AWV or the last Subsequent AWV    I have reviewed the patient's medical history in detail and updated the computerized patient record. Assessment/Plan   Education and counseling provided:  Are appropriate based on today's review and evaluation    1. Medicare annual wellness visit, subsequent     Depression Risk Factor Screening     3 most recent PHQ Screens 9/21/2022   Little interest or pleasure in doing things Not at all   Feeling down, depressed, irritable, or hopeless Not at all   Total Score PHQ 2 0       Alcohol & Drug Abuse Risk Screen    Do you average more than 1 drink per night or more than 7 drinks a week:  No    On any one occasion in the past three months have you have had more than 3 drinks containing alcohol:  No          Functional Ability and Level of Safety    Hearing: Hearing is good. Activities of Daily Living: The home contains: handrails and grab bars stair , shower chair  Patient does total self care      Ambulation: with no difficulty     Fall Risk:  Fall Risk Assessment, last 12 mths 9/21/2022   Able to walk? Yes   Fall in past 12 months? 0   Do you feel unsteady? 0   Are you worried about falling 0   Number of falls in past 12 months -   Fall with injury?  -      Abuse Screen:  Patient is not abused   Lives w  safe    Cognitive Screening    Has your family/caregiver stated any concerns about your memory: no     Cognitive Screening: Normal - Mini Cog Test    No memory concerns   Pt knows the month, day and year   Can recall 3/3 objects      Health Maintenance Due     Health Maintenance Due   Topic Date Due    Eye Exam Retinal or Dilated  09/10/2015    COVID-19 Vaccine (4 - Booster for [employer] series) 03/15/2022    Medicare Yearly Exam  08/19/2022    Flu Vaccine (1) 09/01/2022       Patient Care Team   Patient Care Team:  Juana Peña MD as PCP - General (Internal Medicine Physician)  Valery Kapoor MD as PCP - [facility] Provider  Nino Wilcox  (Ophthalmology)  Naomi Rocha (Inactive) (Dermatology Physician)  Skye Alonso MD (Cardiovascular Disease Physician)  barber (Ophthalmology)  Joyce Awad MD (Neurosurgery)    History     Patient Active Problem List   Diagnosis Code    Hypertension I10    CAD (coronary artery disease) I25.10    Mixed hyperlipidemia E78.2    S/P Stent LAD (LISSA) Z95.820    Mitral valve disorders(424.0) I05.9    Gout M10.9    Left carotid bruit R09.89    Diabetes mellitus without complication ([address] B31.6     Past Medical History:   Diagnosis Date    CAD (coronary artery disease) 9/16/2009    EF=65-70%; Mild-mod MR; Dr Dino Pearce    Diabetes [facility]) 9/16/2009    boarder line controlle by diet and exercise. Hyperlipidemia 9/16/2009    Hypertension 9/16/2009    Valvular heart disease       Past Surgical History:   Procedure Laterality Date    HX CORONARY STENT PLACEMENT      HX HEART CATHETERIZATION      HX ORTHOPAEDIC  10/2013    right ankle    HX ORTHOPAEDIC  2/20/14    INCISION AND DRAINAGE RIGHT ANKLE AND HARDWARE REMOVAL    [facility] CARDIAC SURG PROCEDURE UNLIST  2008    cardiac stent     Current Outpatient Medications   Medication Sig Dispense Refill    metoprolol tartrate (LOPRESSOR) 50 mg tablet TAKE 1 TABLET BY MOUTH TWICE DAILY 180 Tablet 0    allopurinoL (ZYLOPRIM) 100 mg tablet TAKE 1 TABLET BY MOUTH DAILY 90 Tablet 0    amLODIPine (NORVASC) 5 mg tablet TAKE 2 TABLETS BY MOUTH DAILY 180 Tablet 1    atorvastatin (LIPITOR) 20 mg tablet TAKE 1 TABLET BY MOUTH DAILY 90 Tablet 2    metFORMIN (GLUCOPHAGE) 500 mg tablet TAKE 1 TABLET BY MOUTH TWICE DAILY WITH MEALS 180 Tablet 0    potassium chloride (K-DUR, KLOR-CON M20) 20 mEq tablet Take 1 Tablet by mouth daily. 90 Tablet 1    atorvastatin (LIPITOR) 20 mg tablet Take 1 Tablet by mouth daily.  90 Tablet 2    metFORMIN (GLUCOPHAGE) 500 mg tablet Take 1 Tablet by mouth two (2) times daily (with meals). 180 Tablet 0    chlorthalidone (HYGROTON) 25 mg tablet TAKE 1 TABLET BY MOUTH DAILY 90 Tablet 1    loratadine (Claritin) 10 mg tablet Take 10 mg by mouth daily. diphenhydrAMINE (Benadryl Allergy) 25 mg tablet Take 25 mg by mouth every six (6) hours as needed. ASPIRIN LOW DOSE PO Take  by mouth daily. Allergies   Allergen Reactions    Crestor [Rosuvastatin] Unknown (comments)       Family History   Problem Relation Age of Onset    Hypertension Mother     Heart Disease Mother     Hypertension Father     Heart Disease Father      Social History     Tobacco Use    Smoking status: Never    Smokeless tobacco: Never   Substance Use Topics    Alcohol use: No     ACP not on file. SDMs are her  Larisa Alcaraz), son and daughter-in-law. Provided information in past        Colonoscopy: declines further  Pap: 9/2010, declines further  Mammogram: declines further  Dexa: declines further    Tdap: 12/2018  Pneumovax: 11/18/2014  Chnrnse70: 4/28/2016  Shingrix: both rounds completed   Flu shot: 9/22 annual fall     A1c: 5/22 6.1 due     Eye exam: [facility], 8/22 annual     Lipids: 2/22 LDL 60 annual     Covid: three doses (Charlies Hashimoto) due booster    Medication reconciliation completed by MA and reviewed by me. Medical/surgical/social/family history reviewed and updated by me. Patient provided AVS and preventative screening table. Patient verbalized understanding of all information discussed.      Janet Cuellar MD

## 2022-09-21 ENCOUNTER — OFFICE VISIT (OUTPATIENT)
Dept: INTERNAL MEDICINE CLINIC | Age: 87
End: 2022-09-21
Payer: MEDICARE

## 2022-09-21 VITALS
HEART RATE: 61 BPM | HEIGHT: 66 IN | WEIGHT: 150 LBS | BODY MASS INDEX: 24.11 KG/M2 | DIASTOLIC BLOOD PRESSURE: 66 MMHG | TEMPERATURE: 97.9 F | OXYGEN SATURATION: 98 % | SYSTOLIC BLOOD PRESSURE: 126 MMHG | RESPIRATION RATE: 16 BRPM

## 2022-09-21 DIAGNOSIS — Z00.00 MEDICARE ANNUAL WELLNESS VISIT, SUBSEQUENT: Primary | ICD-10-CM

## 2022-09-21 DIAGNOSIS — M1A.9XX0 CHRONIC GOUT WITHOUT TOPHUS, UNSPECIFIED CAUSE, UNSPECIFIED SITE: ICD-10-CM

## 2022-09-21 DIAGNOSIS — E87.6 HYPOKALEMIA: ICD-10-CM

## 2022-09-21 DIAGNOSIS — E11.9 DIABETES MELLITUS WITHOUT COMPLICATION (HCC): ICD-10-CM

## 2022-09-21 DIAGNOSIS — I10 PRIMARY HYPERTENSION: ICD-10-CM

## 2022-09-21 DIAGNOSIS — Z23 NEEDS FLU SHOT: ICD-10-CM

## 2022-09-21 DIAGNOSIS — E78.2 MIXED HYPERLIPIDEMIA: ICD-10-CM

## 2022-09-21 DIAGNOSIS — I25.10 CORONARY ARTERY DISEASE INVOLVING NATIVE CORONARY ARTERY OF NATIVE HEART WITHOUT ANGINA PECTORIS: ICD-10-CM

## 2022-09-21 LAB
COMMENT, HOLDF: NORMAL
SAMPLES BEING HELD,HOLD: NORMAL

## 2022-09-21 PROCEDURE — 90694 VACC AIIV4 NO PRSRV 0.5ML IM: CPT | Performed by: INTERNAL MEDICINE

## 2022-09-21 PROCEDURE — G8536 NO DOC ELDER MAL SCRN: HCPCS | Performed by: INTERNAL MEDICINE

## 2022-09-21 PROCEDURE — G8400 PT W/DXA NO RESULTS DOC: HCPCS | Performed by: INTERNAL MEDICINE

## 2022-09-21 PROCEDURE — G8752 SYS BP LESS 140: HCPCS | Performed by: INTERNAL MEDICINE

## 2022-09-21 PROCEDURE — 1090F PRES/ABSN URINE INCON ASSESS: CPT | Performed by: INTERNAL MEDICINE

## 2022-09-21 PROCEDURE — G0439 PPPS, SUBSEQ VISIT: HCPCS | Performed by: INTERNAL MEDICINE

## 2022-09-21 PROCEDURE — G8754 DIAS BP LESS 90: HCPCS | Performed by: INTERNAL MEDICINE

## 2022-09-21 PROCEDURE — 1101F PT FALLS ASSESS-DOCD LE1/YR: CPT | Performed by: INTERNAL MEDICINE

## 2022-09-21 PROCEDURE — G8510 SCR DEP NEG, NO PLAN REQD: HCPCS | Performed by: INTERNAL MEDICINE

## 2022-09-21 PROCEDURE — G0463 HOSPITAL OUTPT CLINIC VISIT: HCPCS | Performed by: INTERNAL MEDICINE

## 2022-09-21 PROCEDURE — G8420 CALC BMI NORM PARAMETERS: HCPCS | Performed by: INTERNAL MEDICINE

## 2022-09-21 PROCEDURE — 99214 OFFICE O/P EST MOD 30 MIN: CPT | Performed by: INTERNAL MEDICINE

## 2022-09-21 PROCEDURE — G8427 DOCREV CUR MEDS BY ELIG CLIN: HCPCS | Performed by: INTERNAL MEDICINE

## 2022-09-21 RX ORDER — METFORMIN HYDROCHLORIDE 500 MG/1
500 TABLET ORAL 2 TIMES DAILY WITH MEALS
Qty: 180 TABLET | Refills: 0 | Status: SHIPPED | OUTPATIENT
Start: 2022-09-21

## 2022-09-21 NOTE — PROGRESS NOTES
Identified pt with two pt identifiers(name and ). Reviewed record in preparation for visit and have obtained necessary documentation. Chief Complaint   Patient presents with    Annual Wellness Visit    Medication Refill        Health Maintenance Due   Topic    Eye Exam Retinal or Dilated     COVID-19 Vaccine (4 - Booster for [employer] series)    Flu Vaccine (1)    Medicare Yearly Exam       Visit Vitals  BP (!) 147/70 (BP 1 Location: Right arm, BP Patient Position: Sitting, BP Cuff Size: Adult)   Pulse 61   Temp 97.9 °F (36.6 °C) (Temporal)   Resp 16   Ht 5' 6\" (1.676 m)   Wt 150 lb (68 kg)   SpO2 98%   BMI 24.21 kg/m²     Pain Scale: 0 - No pain/10    Coordination of Care Questionnaire:  :   1. Have you been to the ER, urgent care clinic since your last visit? Hospitalized since your last visit? No    2. Have you seen or consulted any other health care providers outside of the [address] [place] [address] since your last visit? Include any pap smears or colon screening.  No

## 2022-09-21 NOTE — LETTER
9/22/2022 10:13 AM    Ms. Ascencion Garcia  [address] [facility] [address] [phone]    Dear Care Provider    Please fax us the most recent office notes so that we may update the patient's records for continuity of care. Our fax number: [phone].     Patient:   Ascencion Garcia  1935          Sincerely,      Fang Loco MD

## 2022-09-22 LAB
ALBUMIN SERPL-MCNC: 3.8 G/DL (ref 3.5–5)
ALBUMIN/GLOB SERPL: 1.2 {RATIO} (ref 1.1–2.2)
ALP SERPL-CCNC: 114 U/L (ref 45–117)
ALT SERPL-CCNC: 32 U/L (ref 12–78)
ANION GAP SERPL CALC-SCNC: 7 MMOL/L (ref 5–15)
AST SERPL-CCNC: 26 U/L (ref 15–37)
BILIRUB SERPL-MCNC: 0.4 MG/DL (ref 0.2–1)
BUN SERPL-MCNC: 17 MG/DL (ref 6–20)
BUN/CREAT SERPL: 19 (ref 12–20)
CALCIUM SERPL-MCNC: 9.3 MG/DL (ref 8.5–10.1)
CHLORIDE SERPL-SCNC: 99 MMOL/L (ref 97–108)
CHOLEST SERPL-MCNC: 148 MG/DL
CO2 SERPL-SCNC: 32 MMOL/L (ref 21–32)
CREAT SERPL-MCNC: 0.89 MG/DL (ref 0.55–1.02)
ERYTHROCYTE [DISTWIDTH] IN BLOOD BY AUTOMATED COUNT: 13.2 % (ref 11.5–14.5)
EST. AVERAGE GLUCOSE BLD GHB EST-MCNC: 140 MG/DL
GLOBULIN SER CALC-MCNC: 3.3 G/DL (ref 2–4)
GLUCOSE SERPL-MCNC: 184 MG/DL (ref 65–100)
HBA1C MFR BLD: 6.5 % (ref 4–5.6)
HCT VFR BLD AUTO: 42.4 % (ref 35–47)
HDLC SERPL-MCNC: 63 MG/DL
HDLC SERPL: 2.3 {RATIO} (ref 0–5)
HGB BLD-MCNC: 13.8 G/DL (ref 11.5–16)
LDLC SERPL CALC-MCNC: 59.4 MG/DL (ref 0–100)
MCH RBC QN AUTO: 31 PG (ref 26–34)
MCHC RBC AUTO-ENTMCNC: 32.5 G/DL (ref 30–36.5)
MCV RBC AUTO: 95.3 FL (ref 80–99)
NRBC # BLD: 0 K/UL (ref 0–0.01)
NRBC BLD-RTO: 0 PER 100 WBC
PLATELET # BLD AUTO: 296 K/UL (ref 150–400)
PMV BLD AUTO: 11.7 FL (ref 8.9–12.9)
POTASSIUM SERPL-SCNC: 3.5 MMOL/L (ref 3.5–5.1)
PROT SERPL-MCNC: 7.1 G/DL (ref 6.4–8.2)
RBC # BLD AUTO: 4.45 M/UL (ref 3.8–5.2)
SODIUM SERPL-SCNC: 138 MMOL/L (ref 136–145)
TRIGL SERPL-MCNC: 128 MG/DL (ref ?–150)
URATE SERPL-MCNC: 5.3 MG/DL (ref 2.6–6)
VLDLC SERPL CALC-MCNC: 25.6 MG/DL
WBC # BLD AUTO: 9.3 K/UL (ref 3.6–11)

## 2022-10-07 RX ORDER — CHLORTHALIDONE 25 MG/1
25 TABLET ORAL DAILY
Qty: 90 TABLET | Refills: 1 | Status: SHIPPED | OUTPATIENT
Start: 2022-10-07

## 2022-10-07 NOTE — TELEPHONE ENCOUNTER
Pharmacy and phone number verified
Refill Request Received for the Following Medication     Requested Prescriptions     Pending Prescriptions Disp Refills    chlorthalidone (HYGROTON) 25 mg tablet 90 Tablet 1     Sig: Take 1 Tablet by mouth daily.        Last Prescribed:04-    Last Appointment With Me:  10/4/2022     Future Appointments:  Future Appointments   Date Time Provider Guido Goldstein   11/22/2022  2:40 PM MD DIANE Greer BS AMB   1/4/2023  9:00 AM Antonella Velasquez MD [facility] BS AMB
No

## 2022-10-19 RX ORDER — ALLOPURINOL 100 MG/1
TABLET ORAL
Qty: 90 TABLET | Refills: 0 | Status: SHIPPED | OUTPATIENT
Start: 2022-10-19

## 2022-11-22 ENCOUNTER — OFFICE VISIT (OUTPATIENT)
Dept: CARDIOLOGY CLINIC | Age: 87
End: 2022-11-22
Payer: MEDICARE

## 2022-11-22 VITALS
OXYGEN SATURATION: 99 % | RESPIRATION RATE: 16 BRPM | SYSTOLIC BLOOD PRESSURE: 100 MMHG | DIASTOLIC BLOOD PRESSURE: 60 MMHG | HEART RATE: 67 BPM | BODY MASS INDEX: 23.63 KG/M2 | HEIGHT: 66 IN | WEIGHT: 147 LBS

## 2022-11-22 DIAGNOSIS — Z95.820 S/P ANGIOPLASTY WITH STENT: ICD-10-CM

## 2022-11-22 DIAGNOSIS — E11.9 DIABETES MELLITUS WITHOUT COMPLICATION (HCC): ICD-10-CM

## 2022-11-22 DIAGNOSIS — I44.7 LBBB (LEFT BUNDLE BRANCH BLOCK): ICD-10-CM

## 2022-11-22 DIAGNOSIS — I05.9 MITRAL VALVE DISEASE: ICD-10-CM

## 2022-11-22 DIAGNOSIS — E78.2 MIXED HYPERLIPIDEMIA: ICD-10-CM

## 2022-11-22 DIAGNOSIS — I10 ESSENTIAL HYPERTENSION: ICD-10-CM

## 2022-11-22 DIAGNOSIS — I25.10 CORONARY ARTERY DISEASE INVOLVING NATIVE CORONARY ARTERY OF NATIVE HEART WITHOUT ANGINA PECTORIS: Primary | ICD-10-CM

## 2022-11-22 DIAGNOSIS — R09.89 LEFT CAROTID BRUIT: ICD-10-CM

## 2022-11-22 PROCEDURE — G8754 DIAS BP LESS 90: HCPCS | Performed by: INTERNAL MEDICINE

## 2022-11-22 PROCEDURE — 93010 ELECTROCARDIOGRAM REPORT: CPT | Performed by: INTERNAL MEDICINE

## 2022-11-22 PROCEDURE — G8427 DOCREV CUR MEDS BY ELIG CLIN: HCPCS | Performed by: INTERNAL MEDICINE

## 2022-11-22 PROCEDURE — G8536 NO DOC ELDER MAL SCRN: HCPCS | Performed by: INTERNAL MEDICINE

## 2022-11-22 PROCEDURE — G0463 HOSPITAL OUTPT CLINIC VISIT: HCPCS | Performed by: INTERNAL MEDICINE

## 2022-11-22 PROCEDURE — 1123F ACP DISCUSS/DSCN MKR DOCD: CPT | Performed by: INTERNAL MEDICINE

## 2022-11-22 PROCEDURE — 3044F HG A1C LEVEL LT 7.0%: CPT | Performed by: INTERNAL MEDICINE

## 2022-11-22 PROCEDURE — G8510 SCR DEP NEG, NO PLAN REQD: HCPCS | Performed by: INTERNAL MEDICINE

## 2022-11-22 PROCEDURE — G8752 SYS BP LESS 140: HCPCS | Performed by: INTERNAL MEDICINE

## 2022-11-22 PROCEDURE — 93005 ELECTROCARDIOGRAM TRACING: CPT | Performed by: INTERNAL MEDICINE

## 2022-11-22 PROCEDURE — 1090F PRES/ABSN URINE INCON ASSESS: CPT | Performed by: INTERNAL MEDICINE

## 2022-11-22 PROCEDURE — 99214 OFFICE O/P EST MOD 30 MIN: CPT | Performed by: INTERNAL MEDICINE

## 2022-11-22 PROCEDURE — 3074F SYST BP LT 130 MM HG: CPT | Performed by: INTERNAL MEDICINE

## 2022-11-22 PROCEDURE — 3078F DIAST BP <80 MM HG: CPT | Performed by: INTERNAL MEDICINE

## 2022-11-22 PROCEDURE — G8400 PT W/DXA NO RESULTS DOC: HCPCS | Performed by: INTERNAL MEDICINE

## 2022-11-22 PROCEDURE — G8420 CALC BMI NORM PARAMETERS: HCPCS | Performed by: INTERNAL MEDICINE

## 2022-11-22 PROCEDURE — 1101F PT FALLS ASSESS-DOCD LE1/YR: CPT | Performed by: INTERNAL MEDICINE

## 2022-11-22 NOTE — PROGRESS NOTES
[address], [place], [address] [place] [address]  [phone]     Subjective:      Dorothy Callahan is a 82 y.o. female is here for routine f/u. The patient was last seen by us in September 2021. Won at the [employer] again. Walking for exercise about 30-60 minutes a day. Also changed her diet with more veggies and less starch. Today, the patient denies chest pain/ shortness of breath, orthopnea, PND, LE edema, palpitations, syncope, or presyncope. Patient Active Problem List    Diagnosis Date Noted    Diabetes mellitus without complication ([facility] [address] 02/22/2135    Left carotid bruit 01/16/2015    Gout 06/11/2013    Mitral valve disease 05/08/2012    Mixed hyperlipidemia 03/07/2012    S/P Stent LAD (LISSA) 03/07/2012    Hypertension 09/16/2009    CAD (coronary artery disease) 09/16/2009      Miller Marquez MD  Past Medical History:   Diagnosis Date    CAD (coronary artery disease) 9/16/2009    EF=65-70%; Mild-mod MR; Dr Kirkpatrick Ours    Diabetes [facility]) 9/16/2009    boarder line controlle by diet and exercise.      Hyperlipidemia 9/16/2009    Hypertension 9/16/2009    Valvular heart disease       Past Surgical History:   Procedure Laterality Date    HX CORONARY STENT PLACEMENT      HX HEART CATHETERIZATION      HX ORTHOPAEDIC  10/2013    right ankle    HX ORTHOPAEDIC  2/20/14    INCISION AND DRAINAGE RIGHT ANKLE AND HARDWARE REMOVAL    [facility] CARDIAC SURG PROCEDURE UNLIST  2008    cardiac stent     Allergies   Allergen Reactions    Crestor [Rosuvastatin] Unknown (comments)      Family History   Problem Relation Age of Onset    Hypertension Mother     Heart Disease Mother     Hypertension Father     Heart Disease Father       Social History     Socioeconomic History    Marital status:      Spouse name: Not on file    Number of children: Not on file    Years of education: Not on file    Highest education level: Not on file   Occupational History    Not on file   Tobacco Use    Smoking status: Never    Smokeless tobacco: Never   Vaping Use    Vaping Use: Never used   Substance and Sexual Activity    Alcohol use: No    Drug use: No    Sexual activity: Yes     Partners: Male     Birth control/protection: None   Other Topics Concern    Not on file   Social History Narrative    Not on file     Social Determinants of Health     Financial Resource Strain: Not on file   Food Insecurity: Not on file   Transportation Needs: Not on file   Physical Activity: Not on file   Stress: Not on file   Social Connections: Not on file   Intimate Partner Violence: Not on file   Housing Stability: Not on file      Current Outpatient Medications   Medication Sig    allopurinoL (ZYLOPRIM) 100 mg tablet TAKE 1 TABLET BY MOUTH DAILY    chlorthalidone (HYGROTON) 25 mg tablet Take 1 Tablet by mouth daily. metFORMIN (GLUCOPHAGE) 500 mg tablet Take 1 Tablet by mouth two (2) times daily (with meals). metoprolol tartrate (LOPRESSOR) 50 mg tablet TAKE 1 TABLET BY MOUTH TWICE DAILY    amLODIPine (NORVASC) 5 mg tablet TAKE 2 TABLETS BY MOUTH DAILY    potassium chloride (K-DUR, KLOR-CON M20) 20 mEq tablet Take 1 Tablet by mouth daily. atorvastatin (LIPITOR) 20 mg tablet Take 1 Tablet by mouth daily. metFORMIN (GLUCOPHAGE) 500 mg tablet Take 1 Tablet by mouth two (2) times daily (with meals). ASPIRIN LOW DOSE PO Take  by mouth daily. atorvastatin (LIPITOR) 20 mg tablet TAKE 1 TABLET BY MOUTH DAILY (Patient not taking: No sig reported)    loratadine (CLARITIN) 10 mg tablet Take 10 mg by mouth daily. (Patient not taking: No sig reported)    diphenhydrAMINE (BENADRYL) 25 mg tablet Take 25 mg by mouth every six (6) hours as needed. (Patient not taking: No sig reported)     No current facility-administered medications for this visit.          Review of Symptoms:  11 systems reviewed, negative other than as stated in the HPI    Physical ExamPhysical Exam:    Vitals:    11/22/22 1451   BP: 100/60   Pulse: 67   Resp: 16   SpO2: 99%   Weight: 147 lb (66.7 kg)   Height: 5' 6\" (1.676 m)     Body mass index is 23.73 kg/m². General PE  Gen:  NAD  Mental Status - Alert. General Appearance - Not in acute distress. HEENT:  PERRL, no carotid bruits or JVD  Chest and Lung Exam   Inspection: Accessory muscles - No use of accessory muscles in breathing. Auscultation:   Breath sounds: - Normal.   Cardiovascular   Inspection: Jugular vein - Bilateral - Inspection Normal.   Palpation/Percussion:   Apical Impulse: - Normal.   Auscultation: Rhythm - Regular. Heart Sounds - S1 WNL and S2 WNL. No S3 or S4. Murmurs & Other Heart Sounds: Auscultation of the heart reveals - No Murmurs. Peripheral Vascular   Upper Extremity: Inspection - Bilateral - No Cyanotic nailbeds or Digital clubbing. Lower Extremity:   Palpation: Edema - Bilateral - No edema. Abdomen:   Soft, non-tender, bowel sounds are active.   Neuro: A&O times 3, CN and motor grossly WNL    Labs:   Lab Results   Component Value Date/Time    Cholesterol, total 148 09/21/2022 10:16 AM    Cholesterol, total 157 02/15/2022 09:08 AM    Cholesterol, total 130 05/25/2021 09:08 AM    Cholesterol, total 117 04/28/2021 03:48 AM    Cholesterol, total 136 04/21/2021 10:01 AM    HDL Cholesterol 63 09/21/2022 10:16 AM    HDL Cholesterol 65 02/15/2022 09:08 AM    HDL Cholesterol 50 05/25/2021 09:08 AM    HDL Cholesterol 57 04/28/2021 03:48 AM    HDL Cholesterol 66 04/21/2021 10:01 AM    LDL, calculated 59.4 09/21/2022 10:16 AM    LDL, calculated 60.6 02/15/2022 09:08 AM    LDL, calculated 62 05/25/2021 09:08 AM    LDL, calculated 40.6 04/28/2021 03:48 AM    LDL, calculated 54.4 04/21/2021 10:01 AM    LDL, calculated 80 07/07/2020 09:55 AM    Triglyceride 128 09/21/2022 10:16 AM    Triglyceride 157 (H) 02/15/2022 09:08 AM    Triglyceride 96 05/25/2021 09:08 AM    Triglyceride 97 04/28/2021 03:48 AM    Triglyceride 78 04/21/2021 10:01 AM    CHOL/HDL Ratio 2.3 09/21/2022 10:16 AM    CHOL/HDL Ratio 2.4 02/15/2022 09:08 AM CHOL/HDL Ratio 2.1 04/28/2021 03:48 AM    CHOL/HDL Ratio 2.1 04/21/2021 10:01 AM    CHOL/HDL Ratio 2.6 09/15/2010 07:53 AM     Lab Results   Component Value Date/Time    CK 52 02/23/2014 03:35 AM     Lab Results   Component Value Date/Time    Sodium 138 09/21/2022 10:16 AM    Potassium 3.5 09/21/2022 10:16 AM    Chloride 99 09/21/2022 10:16 AM    CO2 32 09/21/2022 10:16 AM    Anion gap 7 09/21/2022 10:16 AM    Glucose 184 (H) 09/21/2022 10:16 AM    BUN 17 09/21/2022 10:16 AM    Creatinine 0.89 09/21/2022 10:16 AM    BUN/Creatinine ratio 19 09/21/2022 10:16 AM    GFR est AA >60 09/21/2022 10:16 AM    GFR est non-AA >60 09/21/2022 10:16 AM    Calcium 9.3 09/21/2022 10:16 AM    Bilirubin, total 0.4 09/21/2022 10:16 AM    Alk. phosphatase 114 09/21/2022 10:16 AM    Protein, total 7.1 09/21/2022 10:16 AM    Albumin 3.8 09/21/2022 10:16 AM    Globulin 3.3 09/21/2022 10:16 AM    A-G Ratio 1.2 09/21/2022 10:16 AM    ALT (SGPT) 32 09/21/2022 10:16 AM       EKG:  NSR, poor R wave progression       Assessment:          ICD-10-CM ICD-9-CM    1. Coronary artery disease involving native coronary artery of native heart without angina pectoris  I25.10 414.01 AMB POC EKG ROUTINE W/ 12 LEADS, INTER & REP      2. Essential hypertension  I10 401.9       3. Mixed hyperlipidemia  E78.2 272.2       4. S/P Stent LAD (LISSA)  Z95.820 V45.89       5. LBBB (left bundle branch block)  I44.7 426.3       6. Diabetes mellitus without complication (HCC)  H50.0 250.00       7. Mitral valve disease  I05.9 394.9       8.  Left carotid bruit  R09.89 785.9           Orders Placed This Encounter    AMB POC EKG ROUTINE W/ 12 LEADS, INTER & REP     Order Specific Question:   Reason for Exam:     Answer:   ROUTINE        Plan:     Coronary artery disease involving native coronary artery of native heart without angina pectoris  Hx of Cypher stent  Lexiscan stress test done 4/2021 without evidence of ischemia  Echo done 5/2012 with preserved LVEF 65%  Continue ASA, statin, BB therapy. Previously on Plavix, however was d/c'd due to small ICH     Essential hypertension  BP controlled. Continue anti-hypertensive therapy and low sodium diet     Mixed hyperlipidemia  LDL 62 in 5/2021  Lab Results   Component Value Date/Time    Cholesterol, total 148 09/21/2022 10:16 AM    HDL Cholesterol 63 09/21/2022 10:16 AM    LDL, calculated 59.4 09/21/2022 10:16 AM    VLDL, calculated 25.6 09/21/2022 10:16 AM    Triglyceride 128 09/21/2022 10:16 AM    CHOL/HDL Ratio 2.3 09/21/2022 10:16 AM   Continue statin therapy and low fat, low cholesterol diet  Lipids managed by PCP     LBBB  New LBBB noted  8/2021  Lexisan stress test done 4/2021 without evidence of ischemia  Echo done 9/2021 with preserved LVEF 60-65%, grade 1 DD, mild MR     Counseled on diet and exercise- eventual goal of 30-60 minutes 5-7 times a week as per AHA guidelines. She is doing great with this. Congratulated on 11 pounds of intentional weight loss since September 2021. Follow up in 1 year, sooner as needed.        Kai Morris MD

## 2022-11-22 NOTE — LETTER
11/22/2022    Patient: Sherrie Guzman   YOB: 1935   Date of Visit: 11/22/2022     Brooks Molina MD  [address]  [address] [facility]  [address]  [facility]  [address] [facility] [address]    Dear Brooks Molina MD,      Thank you for referring Ms. Francisca Grant to [address]  for evaluation. My notes for this consultation are attached. If you have questions, please do not hesitate to call me. I look forward to following your patient along with you.       Sincerely,    Katarina Baxter MD

## 2022-12-08 RX ORDER — METOPROLOL TARTRATE 50 MG/1
TABLET ORAL
Qty: 180 TABLET | Refills: 0 | Status: SHIPPED | OUTPATIENT
Start: 2022-12-08

## 2022-12-08 NOTE — TELEPHONE ENCOUNTER
PCP: Junior Eric MD    Last appt: 11/2022  Future Appointments   Date Time Provider Guido Goldstein   1/4/2023  9:00 AM Junior Eric MD [facility] BS AMB   11/20/2023 10:20 AM Lexy Hebert MD [facility] BS AMB       Requested Prescriptions     Signed Prescriptions Disp Refills    metoprolol tartrate (LOPRESSOR) 50 mg tablet 180 Tablet 0     Sig: TAKE 1 TABLET BY MOUTH TWICE DAILY     Authorizing Provider: Herson Miguel     Ordering User: PATTI PERRY         Other Comments:  Verbal order per provider. Order (medication, dose, route, frequency, amount, refills) repeated and verified twice.

## 2022-12-23 NOTE — PROGRESS NOTES
HISTORY OF PRESENT ILLNESS  Christian Wood is a 82 y.o. female. HPI  Last here 9/21/22. Pt is here to f/u on chronic conditions. Pt c/o BL shoulder pain on and off  She attributes this to arthritis, notes it alternates between her two shoulders. Discussed it likely is arthritis. Tylenol is fine     She has a history of hypertension  BP today is 148/71, will repeat  BP at home 125/70  BP at Dr. Candelaria Segovia on chlorthalidone 25mg daily, metoprolol 50mg BID, and Norvasc 5 mg, she took her BP meds today  no longer on lotrel 5-20mg daily     She has some wch     Patient is diabetic  No home BS readings for review  Taking metformin 500mg BID     Wt today is 148 lbs, down 2 lbs since lov  Wt is in the nl range     Reviewed labs   Will get labs today     Recall echo 9/13/21:  LV: Estimated LVEF is 60 - 65%. Normal systolic function (ejection fraction normal). Small left ventricle. Moderate septal wall hypertrophy. Mild posterior wall hypertrophy. Wall motion: normal. Mild (grade 1) left ventricular diastolic dysfunction. MV: Mild mitral valve regurgitation is present. TV: Mild tricuspid valve regurgitation is present. Pt follows with Dr. Arnulfo Dyer (cardio) for CAD  Last visit 11/22/22 , will f/U 11/23  Reviewed note:  Plan:   Coronary artery disease involving native coronary artery of native heart without angina pectoris  Hx of Cypher stent  Lexiscan stress test done 4/2021 without evidence of ischemia  Echo done 5/2012 with preserved LVEF 65%  Continue ASA, statin, BB therapy. Previously on Plavix, however was d/c'd due to small ICH  Essential hypertension  BP controlled.   Continue anti-hypertensive therapy and low sodium diet  Mixed hyperlipidemia  LDL 62 in 5/2021  Has a history of stent placement   Continues on ASA 81mg  No longer on plavix      Pt previously followed with Dr. Avery Scott (derm) for eczema  Pt will only f/u with this physician prn    Eczema has been good, not needing cream recently certain triggers make it worse       Continues on lipitor 20mg daily for cholesterol, reports compliance  Recall could not tolerate crestor     Taking Klor-Con 20 M EQ      Continues allopurinol 100mg for gout prevention--no recent flares 1/23     Pt lives w/ her      ACP not on file. SDMs are her  Svitlana Cedeño), son and daughter-in-law. Provided information in past      PREVENTIVE:    Colonoscopy: declines further  Pap: 9/2010, declines further  Mammogram: declines further  Dexa: declines further  Tdap: 12/2018  Pneumovax: 11/18/2014  Csbyqia03: 4/28/2016  Shingrix: both rounds completed   Flu shot: 9/21/22  Foot exam: 02/15/22  Microalbumin: 5/22  A1c:  4/21 6.2 8/21 6.5 2/22 6.7 5/22 6.1 9/22 6.5  Eye exam: [facility], 8/22, will get notes  Lipids: 9/22 LDL 59  Covid: 4 doses (pfizer)    Patient Active Problem List    Diagnosis Date Noted    Diabetes mellitus without complication ([facility] [address] 67/77/1459    Left carotid bruit 01/16/2015    Gout 06/11/2013    Mitral valve disease 05/08/2012    Mixed hyperlipidemia 03/07/2012    S/P Stent LAD (LISSA) 03/07/2012    Hypertension 09/16/2009    CAD (coronary artery disease) 09/16/2009     Current Outpatient Medications   Medication Sig Dispense Refill    metoprolol tartrate (LOPRESSOR) 50 mg tablet TAKE 1 TABLET BY MOUTH TWICE DAILY 180 Tablet 0    allopurinoL (ZYLOPRIM) 100 mg tablet TAKE 1 TABLET BY MOUTH DAILY 90 Tablet 0    chlorthalidone (HYGROTON) 25 mg tablet Take 1 Tablet by mouth daily. 90 Tablet 1    metFORMIN (GLUCOPHAGE) 500 mg tablet Take 1 Tablet by mouth two (2) times daily (with meals). 180 Tablet 0    amLODIPine (NORVASC) 5 mg tablet TAKE 2 TABLETS BY MOUTH DAILY 180 Tablet 1    atorvastatin (LIPITOR) 20 mg tablet TAKE 1 TABLET BY MOUTH DAILY (Patient not taking: No sig reported) 90 Tablet 2    potassium chloride (K-DUR, KLOR-CON M20) 20 mEq tablet Take 1 Tablet by mouth daily.  90 Tablet 1    atorvastatin (LIPITOR) 20 mg tablet Take 1 Tablet by mouth daily. 90 Tablet 2    metFORMIN (GLUCOPHAGE) 500 mg tablet Take 1 Tablet by mouth two (2) times daily (with meals). 180 Tablet 0    loratadine (CLARITIN) 10 mg tablet Take 10 mg by mouth daily. (Patient not taking: No sig reported)      diphenhydrAMINE (BENADRYL) 25 mg tablet Take 25 mg by mouth every six (6) hours as needed. (Patient not taking: No sig reported)      ASPIRIN LOW DOSE PO Take  by mouth daily. Past Surgical History:   Procedure Laterality Date    HX CORONARY STENT PLACEMENT      HX HEART CATHETERIZATION      HX ORTHOPAEDIC  10/2013    right ankle    HX ORTHOPAEDIC  2/20/14    INCISION AND DRAINAGE RIGHT ANKLE AND HARDWARE REMOVAL    [place] CARDIAC SURG PROCEDURE UNLIST  2008    cardiac stent      Lab Results   Component Value Date/Time    WBC 9.3 09/21/2022 10:16 AM    HGB 13.8 09/21/2022 10:16 AM    HCT 42.4 09/21/2022 10:16 AM    PLATELET 189 39/35/4779 10:16 AM    MCV 95.3 09/21/2022 10:16 AM     Lab Results   Component Value Date/Time    Cholesterol, total 148 09/21/2022 10:16 AM    HDL Cholesterol 63 09/21/2022 10:16 AM    LDL, calculated 59.4 09/21/2022 10:16 AM    Triglyceride 128 09/21/2022 10:16 AM    CHOL/HDL Ratio 2.3 09/21/2022 10:16 AM     Lab Results   Component Value Date/Time    GFR est non-AA >60 09/21/2022 10:16 AM    GFR est AA >60 09/21/2022 10:16 AM    Creatinine 0.89 09/21/2022 10:16 AM    BUN 17 09/21/2022 10:16 AM    Sodium 138 09/21/2022 10:16 AM    Potassium 3.5 09/21/2022 10:16 AM    Chloride 99 09/21/2022 10:16 AM    CO2 32 09/21/2022 10:16 AM    Magnesium 2.4 07/29/2021 08:40 AM        Review of Systems   Respiratory:  Negative for shortness of breath. Cardiovascular:  Negative for chest pain. Physical Exam  Constitutional:       General: She is not in acute distress. Appearance: She is not ill-appearing, toxic-appearing or diaphoretic. HENT:      Head: Normocephalic and atraumatic.       Right Ear: External ear normal. Left Ear: External ear normal.   Eyes:      General:         Right eye: No discharge. Left eye: No discharge. Conjunctiva/sclera: Conjunctivae normal.      Pupils: Pupils are equal, round, and reactive to light. Cardiovascular:      Rate and Rhythm: Normal rate and regular rhythm. Heart sounds: No murmur heard. No friction rub. No gallop. Pulmonary:      Effort: No respiratory distress. Breath sounds: Normal breath sounds. No wheezing or rales. Chest:      Chest wall: No tenderness. Musculoskeletal:         General: Normal range of motion. Cervical back: Normal.      Right lower leg: No edema. Left lower leg: No edema. Skin:     General: Skin is warm and dry. Neurological:      Mental Status: She is oriented to person, place, and time. Mental status is at baseline. Coordination: Coordination normal.      Gait: Gait normal.   Psychiatric:         Mood and Affect: Mood normal.         Behavior: Behavior normal.       ASSESSMENT and PLAN    ICD-10-CM ICD-9-CM    1. Diabetes mellitus without complication ([facility])  U83.1 735.63 METABOLIC PANEL, BASIC      HEMOGLOBIN A1C WITH EAG   Controlled on metformin 500 mg twice daily does not monitor her blood sugars we will check A1c today and adjust medication as needed   2. Primary hypertension  E35 863.4 METABOLIC PANEL, BASIC      HEMOGLOBIN A1C WITH EAG   Blood pressure initially elevated was very well controlled at cardiology    Continue chlorthalidone metoprolol and Norvasc no change in dose   3. Coronary artery disease involving native coronary artery of native heart without angina pectoris  S06.72 226.19 METABOLIC PANEL, BASIC      HEMOGLOBIN A1C WITH EAG   Medically managed no active signs or symptoms of CAD continues on aspirin up-to-date with cardiology   4. Mixed hyperlipidemia  T11.3 863.5 METABOLIC PANEL, BASIC      HEMOGLOBIN A1C WITH EAG   Controlled on Lipitor   5.  Chronic gout without tophus, unspecified cause, unspecified site  M1A. 9XX0 768.68 METABOLIC PANEL, BASIC      HEMOGLOBIN A1C WITH EAG   No recent flares doing well with allopurinol   6. Hypokalemia  E87.6 276.8    Has been controlled on Klor-Con check level adjust dose as needed     Depression screen reviewed and negative. Scribed by Karl Parish, as dictated by Dr. Hilton Oglesby. Current diagnosis and concerns discussed with pt at length. Pt understands risks and benefits or current treatment plan and medications, and accepts the treatment and medication with any possible risks. Pt asks appropriate questions, which were answered. Pt was instructed to call with any concerns or problems. I have reviewed the note documented by the scribe. The services provided are my own. The documentation is accurate.

## 2023-01-04 ENCOUNTER — OFFICE VISIT (OUTPATIENT)
Dept: INTERNAL MEDICINE CLINIC | Age: 88
End: 2023-01-04
Payer: MEDICARE

## 2023-01-04 VITALS
SYSTOLIC BLOOD PRESSURE: 135 MMHG | HEIGHT: 66 IN | DIASTOLIC BLOOD PRESSURE: 74 MMHG | BODY MASS INDEX: 23.88 KG/M2 | RESPIRATION RATE: 16 BRPM | TEMPERATURE: 98 F | HEART RATE: 65 BPM | WEIGHT: 148.6 LBS | OXYGEN SATURATION: 99 %

## 2023-01-04 DIAGNOSIS — E87.6 HYPOKALEMIA: ICD-10-CM

## 2023-01-04 DIAGNOSIS — E78.2 MIXED HYPERLIPIDEMIA: ICD-10-CM

## 2023-01-04 DIAGNOSIS — M1A.9XX0 CHRONIC GOUT WITHOUT TOPHUS, UNSPECIFIED CAUSE, UNSPECIFIED SITE: ICD-10-CM

## 2023-01-04 DIAGNOSIS — E11.9 DIABETES MELLITUS WITHOUT COMPLICATION (HCC): Primary | ICD-10-CM

## 2023-01-04 DIAGNOSIS — I25.10 CORONARY ARTERY DISEASE INVOLVING NATIVE CORONARY ARTERY OF NATIVE HEART WITHOUT ANGINA PECTORIS: ICD-10-CM

## 2023-01-04 DIAGNOSIS — I10 PRIMARY HYPERTENSION: ICD-10-CM

## 2023-01-04 PROCEDURE — G8400 PT W/DXA NO RESULTS DOC: HCPCS | Performed by: INTERNAL MEDICINE

## 2023-01-04 PROCEDURE — 99214 OFFICE O/P EST MOD 30 MIN: CPT | Performed by: INTERNAL MEDICINE

## 2023-01-04 PROCEDURE — 1101F PT FALLS ASSESS-DOCD LE1/YR: CPT | Performed by: INTERNAL MEDICINE

## 2023-01-04 PROCEDURE — 1090F PRES/ABSN URINE INCON ASSESS: CPT | Performed by: INTERNAL MEDICINE

## 2023-01-04 PROCEDURE — G8536 NO DOC ELDER MAL SCRN: HCPCS | Performed by: INTERNAL MEDICINE

## 2023-01-04 PROCEDURE — G8420 CALC BMI NORM PARAMETERS: HCPCS | Performed by: INTERNAL MEDICINE

## 2023-01-04 PROCEDURE — G8427 DOCREV CUR MEDS BY ELIG CLIN: HCPCS | Performed by: INTERNAL MEDICINE

## 2023-01-04 PROCEDURE — G0463 HOSPITAL OUTPT CLINIC VISIT: HCPCS | Performed by: INTERNAL MEDICINE

## 2023-01-04 PROCEDURE — G8510 SCR DEP NEG, NO PLAN REQD: HCPCS | Performed by: INTERNAL MEDICINE

## 2023-01-04 NOTE — PROGRESS NOTES
1. \"Have you been to the ER, urgent care clinic since your last visit? Hospitalized since your last visit? \" No    2. \"Have you seen or consulted any other health care providers outside of the [address] [place] [address] since your last visit? \" No     3. For patients aged 39-70: Has the patient had a colonoscopy / FIT/ Cologuard? Yes - Care Gap present. Most recent result on file      If the patient is female:    4. For patients aged 41-77: Has the patient had a mammogram within the past 2 years? NA - based on age or sex      11. For patients aged 21-65: Has the patient had a pap smear?  NA - based on age or sex

## 2023-01-05 LAB
ANION GAP SERPL CALC-SCNC: 6 MMOL/L (ref 5–15)
BUN SERPL-MCNC: 27 MG/DL (ref 6–20)
BUN/CREAT SERPL: 33 (ref 12–20)
CALCIUM SERPL-MCNC: 10 MG/DL (ref 8.5–10.1)
CHLORIDE SERPL-SCNC: 100 MMOL/L (ref 97–108)
CO2 SERPL-SCNC: 32 MMOL/L (ref 21–32)
CREAT SERPL-MCNC: 0.82 MG/DL (ref 0.55–1.02)
EST. AVERAGE GLUCOSE BLD GHB EST-MCNC: 120 MG/DL
GLUCOSE SERPL-MCNC: 100 MG/DL (ref 65–100)
HBA1C MFR BLD: 5.8 % (ref 4–5.6)
POTASSIUM SERPL-SCNC: 3.8 MMOL/L (ref 3.5–5.1)
SODIUM SERPL-SCNC: 138 MMOL/L (ref 136–145)

## 2023-02-07 RX ORDER — ALLOPURINOL 100 MG/1
100 TABLET ORAL DAILY
Qty: 90 TABLET | Refills: 0 | Status: SHIPPED | OUTPATIENT
Start: 2023-02-07

## 2023-02-07 NOTE — TELEPHONE ENCOUNTER
PCP: Fermin Avila MD    Last appt: 1/4/2023  Future Appointments   Date Time Provider Guido Goldstein   4/11/2023  9:30 AM Fermin Avila MD [facility] BS AMB   11/20/2023 10:20 AM Taran Tierney MD [facility] BS AMB       Requested Prescriptions     Pending Prescriptions Disp Refills    allopurinoL (ZYLOPRIM) 100 mg tablet 90 Tablet 0     Sig: Take 1 Tablet by mouth daily.

## 2023-02-22 RX ORDER — ALLOPURINOL 100 MG/1
100 TABLET ORAL DAILY
Qty: 90 TABLET | Refills: 0 | Status: CANCELLED | OUTPATIENT
Start: 2023-02-22

## 2023-02-22 NOTE — TELEPHONE ENCOUNTER
Pt states she is out of medication and the pharmacy has tried to get since Monday. Pt will need today she is stating.

## 2023-02-23 NOTE — TELEPHONE ENCOUNTER
PCP: Per Landa MD    Last appt: 1/4/2023  Future Appointments   Date Time Provider Guido Goldstein   4/11/2023  9:30 AM Per Landa MD [facility] BS AMB   11/20/2023 10:20 AM Kathi Johnston MD [facility] BS AMB       Requested Prescriptions     Pending Prescriptions Disp Refills    atorvastatin (LIPITOR) 20 mg tablet 90 Tablet 2     Sig: Take 1 Tablet by mouth daily.

## 2023-02-24 RX ORDER — ATORVASTATIN CALCIUM 20 MG/1
20 TABLET, FILM COATED ORAL DAILY
Qty: 90 TABLET | Refills: 2 | Status: SHIPPED | OUTPATIENT
Start: 2023-02-24

## 2023-03-15 RX ORDER — METOPROLOL TARTRATE 50 MG/1
TABLET ORAL
Qty: 180 TABLET | Refills: 2 | Status: SHIPPED | OUTPATIENT
Start: 2023-03-15

## 2023-03-29 DIAGNOSIS — E87.6 HYPOKALEMIA: ICD-10-CM

## 2023-03-29 RX ORDER — POTASSIUM CHLORIDE 20 MEQ/1
20 TABLET, EXTENDED RELEASE ORAL DAILY
Qty: 90 TABLET | Refills: 1 | Status: SHIPPED | OUTPATIENT
Start: 2023-03-29

## 2023-03-29 NOTE — TELEPHONE ENCOUNTER
Future Appointments:  Future Appointments   Date Time Provider Guido Goldstein   4/11/2023  9:30 AM Scot Malhotra MD [facility] BS AMB   11/20/2023 10:20 AM Terrell Hastings MD [facility] BS AMB        Last Appointment With Me:  1/4/2023     Requested Prescriptions     Pending Prescriptions Disp Refills    potassium chloride (K-DUR, KLOR-CON M20) 20 mEq tablet 90 Tablet 1     Sig: Take 1 Tablet by mouth daily.

## 2023-03-29 NOTE — TELEPHONE ENCOUNTER
----- Message from Jade King sent at 3/29/2023  1:37 PM EDT -----  Subject: Refill Request    QUESTIONS  Name of Medication? potassium chloride (K-DUR, KLOR-CON M20) 20 mEq tablet  Patient-reported dosage and instructions? Take 1 Tablet by mouth daily. How many days do you have left? 0  Preferred [facility]acy? [facility] #[id number]  Pharmacy phone number (if available)? [phone]  Additional Information for Provider? Pharmacy gave her an emergency supply   but now pT is completely out, States she has called 3 times to have this   refilled and the pharmacy has called as well. Appt to see PCP 04/11/23.   ---------------------------------------------------------------------------  --------------,  Name of Medication? metFORMIN (GLUCOPHAGE) 500 mg tablet  Patient-reported dosage and instructions? Take 1 Tablet by mouth two (2)   times daily (with meals). How many days do you have left? 0  [facility]? [id number]  Pharmacy phone number (if available)? [phone]  Additional Information for Provider? PT also got an emergency supply for   these as well, PT called this in before she ran out. Both meds wee not   filled. appt on 04/11/23 with PCP for 6 months follow up.  ---------------------------------------------------------------------------  --------------  CALL BACK INFO  What is the best way for the office to contact you? OK to leave message on   voicemail,Do not leave any message, patient will call back for answer  Preferred Call Back Phone Number? [phone]  ---------------------------------------------------------------------------  --------------  SCRIPT ANSWERS  Relationship to Patient?  Self

## 2023-03-30 DIAGNOSIS — E11.9 DIABETES MELLITUS WITHOUT COMPLICATION (HCC): ICD-10-CM

## 2023-03-30 RX ORDER — METFORMIN HYDROCHLORIDE 500 MG/1
500 TABLET ORAL 2 TIMES DAILY WITH MEALS
Qty: 180 TABLET | Refills: 0 | Status: SHIPPED | OUTPATIENT
Start: 2023-03-30

## 2023-04-05 RX ORDER — CHLORTHALIDONE 25 MG/1
25 TABLET ORAL DAILY
Qty: 90 TABLET | Refills: 1
Start: 2023-04-05

## 2023-04-27 ENCOUNTER — CLINICAL SUPPORT (OUTPATIENT)
Dept: INTERNAL MEDICINE CLINIC | Age: 88
End: 2023-04-27

## 2023-04-27 VITALS
HEART RATE: 62 BPM | DIASTOLIC BLOOD PRESSURE: 72 MMHG | TEMPERATURE: 97.4 F | RESPIRATION RATE: 16 BRPM | OXYGEN SATURATION: 100 % | HEIGHT: 66 IN | SYSTOLIC BLOOD PRESSURE: 186 MMHG | BODY MASS INDEX: 23.95 KG/M2 | WEIGHT: 149 LBS

## 2023-04-27 DIAGNOSIS — Z01.30 BP CHECK: Primary | ICD-10-CM

## 2023-04-27 NOTE — PROGRESS NOTES
Pt presents in clinic for BP check per Dr. Urbano Rodriguez. BP taken--see VS.  Visit Vitals  BP (!) 186/72 (BP 1 Location: Right upper arm, BP Patient Position: Sitting)   Pulse 62   Temp 97.4 °F (36.3 °C) (Temporal)   Resp 16   Ht 5' 6\" (1.676 m)   Wt 149 lb (67.6 kg)   SpO2 100%   BMI 24.05 kg/m²       Pt informed Dr. Urbano Rodriguez will be notified. Pt informed if Dr. Urbano Rodriguez makes any adjustments, pt will be contacted. Pt verbalized understanding of information discussed w/ no further questions at this time.

## 2023-04-28 DIAGNOSIS — I10 PRIMARY HYPERTENSION: Primary | ICD-10-CM

## 2023-04-28 NOTE — PROGRESS NOTES
Called patient informed increased amlodipine to 10mg, new script sent to pharmacy   Informed patient come back for a nurse visit in 2 weeks.    Patient verifies an understanding of all information and NV scheduled for 05/01/2023

## 2023-04-28 NOTE — TELEPHONE ENCOUNTER
PCP: Per Landa MD    Last appt: 4/27/2023  Future Appointments   Date Time Provider Guido Goldstein   8/8/2023  9:00 AM Per Landa MD [facility] BS AMB       Requested Prescriptions     Pending Prescriptions Disp Refills    amLODIPine (NORVASC) 5 mg tablet 180 Tablet 0     Sig: Take 2 Tablets by mouth daily.

## 2023-04-29 RX ORDER — AMLODIPINE BESYLATE 5 MG/1
10 TABLET ORAL DAILY
Qty: 180 TABLET | Refills: 0 | Status: SHIPPED | OUTPATIENT
Start: 2023-04-29

## 2023-05-11 ENCOUNTER — NURSE ONLY (OUTPATIENT)
Age: 88
End: 2023-05-11

## 2023-05-11 VITALS — DIASTOLIC BLOOD PRESSURE: 54 MMHG | SYSTOLIC BLOOD PRESSURE: 128 MMHG | OXYGEN SATURATION: 100 % | HEART RATE: 62 BPM

## 2023-05-11 DIAGNOSIS — Z01.30 BP CHECK: Primary | ICD-10-CM

## 2023-05-11 NOTE — PROGRESS NOTES
Pt presents in clinic for BP check per Dr. Eugenia Amezcua. BP taken--see VS.  BP (!) 128/54 (Site: Left Upper Arm, Position: Sitting)   Pulse 62   SpO2 100%     Pt informed Dr. Eugenia Amezuca will be notified. Pt informed if Dr. Eugenia Amezcua makes any adjustments, pt will be contacted. Pt verbalized understanding of information discussed w/ no further questions at this time.

## 2023-07-17 RX ORDER — ALLOPURINOL 100 MG/1
100 TABLET ORAL DAILY
Qty: 90 TABLET | Refills: 0 | Status: SHIPPED | OUTPATIENT
Start: 2023-07-17

## 2023-07-17 RX ORDER — AMLODIPINE BESYLATE 5 MG/1
10 TABLET ORAL DAILY
Qty: 180 TABLET | Refills: 0 | Status: SHIPPED | OUTPATIENT
Start: 2023-07-17

## 2023-07-17 NOTE — TELEPHONE ENCOUNTER
PCP: Yoavni Kent MD    Last appt: [unfilled]  Future Appointments   Date Time Provider [address]   8/8/2023  9:00 AM Major Grullon MD [facility] BS AMB   11/20/2023 10:20 AM David Roberts MD [facility] BS AMB       Requested Prescriptions     Pending Prescriptions Disp Refills    allopurinol (ZYLOPRIM) 100 MG tablet 90 tablet 0     Sig: Take 1 tablet by mouth daily    amLODIPine (NORVASC) 5 MG tablet 180 tablet 0     Sig: Take 2 tablets by mouth daily    metFORMIN (GLUCOPHAGE) 500 MG tablet 180 tablet 0     Sig: Take 1 tablet by mouth 2 times daily (with meals)

## 2023-07-17 NOTE — TELEPHONE ENCOUNTER
Caller requests Refill of:  allopurinol (ZYLOPRIM) 100 MG tablet  metFORMIN (GLUCOPHAGE) 500 MG tablet  amLODIPine (NORVASC) 5 MG tablet      Please send to:  [address] [address]  Store number: [phone]  Near the intersection of: [address]           Visit / Appointment History:  Future Appointments:  Future Appointments   Date Time Provider [address]   8/8/2023  9:00 AM Chetan Austin MD [facility] BS AMB   11/20/2023 10:20 AM Max Lee MD [facility] BS AMB         Last Appointment With Me:  4/11/2023         Caller confirmed instructions and dosages as correct. Caller was advised that Meds will be refilled as soon as possible, however there can be a 48-72 business hour turn around on refill requests.

## 2023-07-28 ASSESSMENT — ENCOUNTER SYMPTOMS: SHORTNESS OF BREATH: 0

## 2023-08-08 ENCOUNTER — OFFICE VISIT (OUTPATIENT)
Age: 88
End: 2023-08-08
Payer: MEDICARE

## 2023-08-08 VITALS
HEIGHT: 66 IN | HEART RATE: 66 BPM | SYSTOLIC BLOOD PRESSURE: 137 MMHG | TEMPERATURE: 98.3 F | OXYGEN SATURATION: 99 % | DIASTOLIC BLOOD PRESSURE: 75 MMHG | BODY MASS INDEX: 24.91 KG/M2 | RESPIRATION RATE: 18 BRPM | WEIGHT: 155 LBS

## 2023-08-08 DIAGNOSIS — I10 PRIMARY HYPERTENSION: ICD-10-CM

## 2023-08-08 DIAGNOSIS — I25.10 CORONARY ARTERY DISEASE INVOLVING NATIVE CORONARY ARTERY OF NATIVE HEART WITHOUT ANGINA PECTORIS: ICD-10-CM

## 2023-08-08 DIAGNOSIS — E53.8 B12 DEFICIENCY: ICD-10-CM

## 2023-08-08 DIAGNOSIS — E11.9 DIABETES MELLITUS WITHOUT COMPLICATION (HCC): Primary | ICD-10-CM

## 2023-08-08 DIAGNOSIS — E78.2 MIXED HYPERLIPIDEMIA: ICD-10-CM

## 2023-08-08 DIAGNOSIS — E87.6 HYPOKALEMIA: ICD-10-CM

## 2023-08-08 DIAGNOSIS — M1A.09X0 IDIOPATHIC CHRONIC GOUT OF MULTIPLE SITES WITHOUT TOPHUS: ICD-10-CM

## 2023-08-08 PROCEDURE — 99214 OFFICE O/P EST MOD 30 MIN: CPT | Performed by: INTERNAL MEDICINE

## 2023-08-08 PROCEDURE — 4004F PT TOBACCO SCREEN RCVD TLK: CPT | Performed by: INTERNAL MEDICINE

## 2023-08-08 PROCEDURE — G8419 CALC BMI OUT NRM PARAM NOF/U: HCPCS | Performed by: INTERNAL MEDICINE

## 2023-08-08 PROCEDURE — 1123F ACP DISCUSS/DSCN MKR DOCD: CPT | Performed by: INTERNAL MEDICINE

## 2023-08-08 PROCEDURE — 3044F HG A1C LEVEL LT 7.0%: CPT | Performed by: INTERNAL MEDICINE

## 2023-08-08 PROCEDURE — 1090F PRES/ABSN URINE INCON ASSESS: CPT | Performed by: INTERNAL MEDICINE

## 2023-08-08 PROCEDURE — G8427 DOCREV CUR MEDS BY ELIG CLIN: HCPCS | Performed by: INTERNAL MEDICINE

## 2023-08-08 SDOH — ECONOMIC STABILITY: HOUSING INSECURITY
IN THE LAST 12 MONTHS, WAS THERE A TIME WHEN YOU DID NOT HAVE A STEADY PLACE TO SLEEP OR SLEPT IN A SHELTER (INCLUDING NOW)?: NO

## 2023-08-08 SDOH — ECONOMIC STABILITY: FOOD INSECURITY: WITHIN THE PAST 12 MONTHS, YOU WORRIED THAT YOUR FOOD WOULD RUN OUT BEFORE YOU GOT MONEY TO BUY MORE.: NEVER TRUE

## 2023-08-08 SDOH — ECONOMIC STABILITY: FOOD INSECURITY: WITHIN THE PAST 12 MONTHS, THE FOOD YOU BOUGHT JUST DIDN'T LAST AND YOU DIDN'T HAVE MONEY TO GET MORE.: NEVER TRUE

## 2023-08-08 SDOH — ECONOMIC STABILITY: INCOME INSECURITY: HOW HARD IS IT FOR YOU TO PAY FOR THE VERY BASICS LIKE FOOD, HOUSING, MEDICAL CARE, AND HEATING?: NOT HARD AT ALL

## 2023-08-08 ASSESSMENT — PATIENT HEALTH QUESTIONNAIRE - PHQ9
SUM OF ALL RESPONSES TO PHQ QUESTIONS 1-9: 0
2. FEELING DOWN, DEPRESSED OR HOPELESS: 0
SUM OF ALL RESPONSES TO PHQ QUESTIONS 1-9: 0
SUM OF ALL RESPONSES TO PHQ9 QUESTIONS 1 & 2: 0
SUM OF ALL RESPONSES TO PHQ QUESTIONS 1-9: 0
SUM OF ALL RESPONSES TO PHQ QUESTIONS 1-9: 0
1. LITTLE INTEREST OR PLEASURE IN DOING THINGS: 0

## 2023-08-08 NOTE — PROGRESS NOTES
1. \"Have you been to the ER, urgent care clinic since your last visit? Hospitalized since your last visit? \" No    2. \"Have you seen or consulted any other health care providers outside of the [address] [place] [address] since your last visit? \" No     3. For patients aged 43-73: Has the patient had a colonoscopy / FIT/ Cologuard? Yes - Care Gap present. Most recent result on file      If the patient is female:    4. For patients aged 43-66: Has the patient had a mammogram within the past 2 years? NA - based on age or sex      11. For patients aged 21-65: Has the patient had a pap smear?  NA - based on age or sex
Comprehensive Metabolic Panel    Lipid Panel    CBC   Controlled with chlorthalidone metoprolol and Norvasc continue home readings great initially elevated here repeat improved no change in dose check metabolic panel for K creatinine and sodium levels     Hemoglobin A1C    Vitamin B12    Uric Acid      3. Coronary artery disease involving native coronary artery of native heart without angina pectoris       Medically managed no active signs or symptoms we will see cardiology in November 4. Mixed hyperlipidemia  Comprehensive Metabolic Panel    Lipid Panel    CBC   Controlled Lipitor check lipids adjust dose as needed Hemoglobin A1C    Vitamin B12    Uric Acid      5. Idiopathic chronic gout of multiple sites without tophus         Controlled with allopurinol continue no change to dose       6. B12 deficiency  Comprehensive Metabolic Panel    Lipid Panel    CBC    Hemoglobin A1C   Check level treat as needed Vitamin B12    Uric Acid      7. Hypokalemia     Continues Klor-Con 20 mEq check level adjust dosing as needed        Depression screen reviewed and negative. Scribed by Santino Salas of [address] [place] [address], as dictated by Dr. Zeenat Rendon. Current diagnosis and concerns discussed with pt at length. Pt understands risks and benefits or current treatment plan and medications, and accepts the treatment and medication with any possible risks. Pt asks appropriate questions, which were answered. Pt was instructed to call with any concerns or problems. I have reviewed the note documented by the scribe. The services provided are my own. The documentation is accurate.

## 2023-08-09 LAB
ALBUMIN SERPL-MCNC: 3.8 G/DL (ref 3.5–5)
ALBUMIN/GLOB SERPL: 0.9 (ref 1.1–2.2)
ALP SERPL-CCNC: 96 U/L (ref 45–117)
ALT SERPL-CCNC: 25 U/L (ref 12–78)
ANION GAP SERPL CALC-SCNC: 6 MMOL/L (ref 5–15)
AST SERPL-CCNC: 19 U/L (ref 15–37)
BILIRUB SERPL-MCNC: 0.3 MG/DL (ref 0.2–1)
BUN SERPL-MCNC: 22 MG/DL (ref 6–20)
BUN/CREAT SERPL: 23 (ref 12–20)
CALCIUM SERPL-MCNC: 9.8 MG/DL (ref 8.5–10.1)
CHLORIDE SERPL-SCNC: 101 MMOL/L (ref 97–108)
CHOLEST SERPL-MCNC: 150 MG/DL
CO2 SERPL-SCNC: 32 MMOL/L (ref 21–32)
CREAT SERPL-MCNC: 0.96 MG/DL (ref 0.55–1.02)
ERYTHROCYTE [DISTWIDTH] IN BLOOD BY AUTOMATED COUNT: 13.1 % (ref 11.5–14.5)
EST. AVERAGE GLUCOSE BLD GHB EST-MCNC: 134 MG/DL
GLOBULIN SER CALC-MCNC: 4.3 G/DL (ref 2–4)
GLUCOSE SERPL-MCNC: 85 MG/DL (ref 65–100)
HBA1C MFR BLD: 6.3 % (ref 4–5.6)
HCT VFR BLD AUTO: 39.8 % (ref 35–47)
HDLC SERPL-MCNC: 71 MG/DL
HDLC SERPL: 2.1 (ref 0–5)
HGB BLD-MCNC: 12.5 G/DL (ref 11.5–16)
LDLC SERPL CALC-MCNC: 52.8 MG/DL (ref 0–100)
MCH RBC QN AUTO: 30.1 PG (ref 26–34)
MCHC RBC AUTO-ENTMCNC: 31.4 G/DL (ref 30–36.5)
MCV RBC AUTO: 95.9 FL (ref 80–99)
NRBC # BLD: 0 K/UL (ref 0–0.01)
NRBC BLD-RTO: 0 PER 100 WBC
PLATELET # BLD AUTO: 255 K/UL (ref 150–400)
PMV BLD AUTO: 11.8 FL (ref 8.9–12.9)
POTASSIUM SERPL-SCNC: 3.6 MMOL/L (ref 3.5–5.1)
PROT SERPL-MCNC: 8.1 G/DL (ref 6.4–8.2)
RBC # BLD AUTO: 4.15 M/UL (ref 3.8–5.2)
SODIUM SERPL-SCNC: 139 MMOL/L (ref 136–145)
TRIGL SERPL-MCNC: 131 MG/DL
URATE SERPL-MCNC: 5.2 MG/DL (ref 2.6–6)
VIT B12 SERPL-MCNC: 421 PG/ML (ref 193–986)
VLDLC SERPL CALC-MCNC: 26.2 MG/DL
WBC # BLD AUTO: 8.3 K/UL (ref 3.6–11)

## 2023-09-20 DIAGNOSIS — I10 PRIMARY HYPERTENSION: Primary | ICD-10-CM

## 2023-09-20 RX ORDER — POTASSIUM CHLORIDE 20 MEQ/1
20 TABLET, EXTENDED RELEASE ORAL DAILY
Qty: 90 TABLET | Refills: 1 | Status: SHIPPED | OUTPATIENT
Start: 2023-09-20

## 2023-09-20 NOTE — TELEPHONE ENCOUNTER
PCP: Fer Villarreal MD    Last appt: 8/8/2023  Future Appointments   Date Time Provider [address]   11/20/2023 10:20 AM Luis Fernando Mcknight MD [facility] BS AMB   12/19/2023  8:45 AM Clark Burden MD [facility] BS AMB       Requested Prescriptions     Pending Prescriptions Disp Refills    potassium chloride (KLOR-CON M) 20 MEQ extended release tablet 90 tablet 1     Sig: Take 1 tablet by mouth daily

## 2023-10-17 RX ORDER — ALLOPURINOL 100 MG/1
100 TABLET ORAL DAILY
Qty: 90 TABLET | Refills: 0 | Status: SHIPPED | OUTPATIENT
Start: 2023-10-17

## 2023-10-17 RX ORDER — AMLODIPINE BESYLATE 5 MG/1
10 TABLET ORAL DAILY
Qty: 180 TABLET | Refills: 0 | Status: SHIPPED | OUTPATIENT
Start: 2023-10-17

## 2023-10-17 NOTE — TELEPHONE ENCOUNTER
Future Appointments:  Future Appointments   Date Time Provider [address]   11/20/2023 10:20 AM Alene Mohs, MD [facility] BS AMB   12/19/2023  8:45 AM Claudy Warner MD [facility] BS AMB        Last Appointment With Me:  8/8/2023     Requested Prescriptions     Pending Prescriptions Disp Refills    metFORMIN (GLUCOPHAGE) 500 MG tablet 180 tablet 0     Sig: Take 1 tablet by mouth 2 times daily (with meals)    amLODIPine (NORVASC) 5 MG tablet 180 tablet 0     Sig: Take 2 tablets by mouth daily    allopurinol (ZYLOPRIM) 100 MG tablet 90 tablet 0     Sig: Take 1 tablet by mouth daily

## 2023-10-17 NOTE — TELEPHONE ENCOUNTER
Caller requests Refill of:  metFORMIN (GLUCOPHAGE) 500 MG tablet  amLODIPine (NORVASC) 5 MG tablet  allopurinol (ZYLOPRIM) 100 MG tablet      Please send to:  [place] [facility] [address], [address] [place]ana [place] Jorge Megan [phone] Con Lamb [phone]          Visit / Appointment History:  Future Appointments:  Future Appointments   Date Time Provider [address]   11/20/2023 10:20 AM Marcelene Soulier, MD [facility] BS AMB   12/19/2023  8:45 AM Nash Mann MD [facility] BS AMB         Last Appointment With Me:  8/8/2023         Caller confirmed instructions and dosages as correct. Caller was advised that Meds will be refilled as soon as possible, however there can be a 48-72 business hour turn around on refill requests.

## 2023-10-31 NOTE — TELEPHONE ENCOUNTER
Future Appointments:  Future Appointments   Date Time Provider Guido Goldstein   4/11/2023  9:30 AM Isabel Hope MD [facility] BS AMB   11/20/2023 10:20 AM Braeden Fernando MD [facility] BS AMB        Last Appointment With Me:  1/4/2023     Requested Prescriptions     Pending Prescriptions Disp Refills    metFORMIN (GLUCOPHAGE) 500 mg tablet 180 Tablet 0     Sig: Take 1 Tablet by mouth two (2) times daily (with meals).
oral

## 2024-01-15 DIAGNOSIS — I10 PRIMARY HYPERTENSION: ICD-10-CM

## 2024-01-15 RX ORDER — POTASSIUM CHLORIDE 20 MEQ/1
20 TABLET, EXTENDED RELEASE ORAL DAILY
Qty: 90 TABLET | Refills: 1 | Status: SHIPPED | OUTPATIENT
Start: 2024-01-15

## 2024-01-15 RX ORDER — AMLODIPINE BESYLATE 5 MG/1
10 TABLET ORAL DAILY
Qty: 180 TABLET | Refills: 1 | Status: SHIPPED | OUTPATIENT
Start: 2024-01-15

## 2024-01-15 NOTE — TELEPHONE ENCOUNTER
Patient called in to request med refill    1.potassium chloride (KLOR-CON M) 20 MEQ extended release tablet     2.metFORMIN (GLUCOPHAGE) 500 MG tablet     3.amLODIPine (NORVASC) 5 MG tablet     4.Patient was unable to spell name of the 4th medication or tell me what the name of the medications was, she only knew the first 2 letters was Be and dosage was 100mg- I do not see anything in her med list that coincides with this.

## 2024-01-15 NOTE — TELEPHONE ENCOUNTER
Future Appointments:  Future Appointments   Date Time Provider Department Center   4/23/2024  8:45 AM Ashleigh Pulido MD [facility] BS AMB   11/22/2024 10:00 AM Pino Candelaria MD BS BS AMB        Last Appointment With Me:  12/19/2023     Requested Prescriptions     Pending Prescriptions Disp Refills    amLODIPine (NORVASC) 5 MG tablet 180 tablet 1     Sig: Take 2 tablets by mouth daily    metFORMIN (GLUCOPHAGE) 500 MG tablet 180 tablet 1     Sig: Take 1 tablet by mouth 2 times daily (with meals)    potassium chloride (KLOR-CON M) 20 MEQ extended release tablet 90 tablet 1     Sig: Take 1 tablet by mouth daily

## 2024-04-16 NOTE — PROGRESS NOTES
HISTORY OF PRESENT ILLNESS  Antonella Bojorquez is a 88 y.o. female.  HPI    Last here 12/19/23. Pt is here to f/u on chronic conditions.     She has a history of hypertension  BP today is 138/66  BP at home 122/61 120/60 123/64 128/62 113/57 121/51 124/61 101/61  Continues on chlorthalidone 25mg daily, metoprolol 50mg BID, and Norvasc 5 mg  No longer on lotrel 5-20mg daily     She has some wch     Patient is diabetic  No home BS readings for review, does not check at home  Taking metformin 500mg BID      Wt today is 150  lbs,  stable since lov   Wt is in the nl range     Reviewed labs   Ordered labs     Recall echo 9/13/21:  LV: Estimated LVEF is 60 - 65%. Normal systolic function (ejection fraction normal). Small left ventricle. Moderate septal wall hypertrophy. Mild posterior wall hypertrophy. Wall motion: normal. Mild (grade 1) left ventricular diastolic dysfunction.  MV: Mild mitral valve regurgitation is present.  TV: Mild tricuspid valve regurgitation is present.     Pt follows with Dr. Candelaria (cardio) for CAD  Last visit 11/23   Continues on ASA 81mg  No longer on plavix      Pt previously followed with Dr. Leyla Alcala (derm) for eczema  Pt will only f/u with this physician prn    Eczema has been good, has been using cream and avoiding triggers      Previously pt c/o BL shoulder pain on and off    We have discussed this is likely arthritis, tylenol is fine      Continues on lipitor 20mg daily for cholesterol, reports compliance  Recall could not tolerate crestor     Taking Klor-Con 20 M EQ      Continues allopurinol 100mg for gout prevention -- no recent flares  Uric acid levels were nl on last labs   Discussed continue the medication she had frequent flares without it     Pt lives w/ her      ACP not on file. SDMs are her  (Bharat Bojorquez), son and daughter-in-law.  Provided information in past      PREVENTIVE:    Colonoscopy: declines further  Pap: 9/2010, declines further  Mammogram: declines

## 2024-04-23 ENCOUNTER — OFFICE VISIT (OUTPATIENT)
Age: 89
End: 2024-04-23
Payer: MEDICARE

## 2024-04-23 VITALS
BODY MASS INDEX: 24.11 KG/M2 | OXYGEN SATURATION: 99 % | WEIGHT: 150 LBS | DIASTOLIC BLOOD PRESSURE: 66 MMHG | SYSTOLIC BLOOD PRESSURE: 138 MMHG | HEIGHT: 66 IN | RESPIRATION RATE: 18 BRPM | TEMPERATURE: 98 F | HEART RATE: 64 BPM

## 2024-04-23 DIAGNOSIS — M1A.09X0 IDIOPATHIC CHRONIC GOUT OF MULTIPLE SITES WITHOUT TOPHUS: ICD-10-CM

## 2024-04-23 DIAGNOSIS — E11.9 DIABETES MELLITUS WITHOUT COMPLICATION (HCC): ICD-10-CM

## 2024-04-23 DIAGNOSIS — E78.2 MIXED HYPERLIPIDEMIA: ICD-10-CM

## 2024-04-23 DIAGNOSIS — E87.6 HYPOKALEMIA: ICD-10-CM

## 2024-04-23 DIAGNOSIS — I25.10 CORONARY ARTERY DISEASE INVOLVING NATIVE CORONARY ARTERY OF NATIVE HEART WITHOUT ANGINA PECTORIS: ICD-10-CM

## 2024-04-23 DIAGNOSIS — I10 PRIMARY HYPERTENSION: Primary | ICD-10-CM

## 2024-04-23 PROCEDURE — 99214 OFFICE O/P EST MOD 30 MIN: CPT | Performed by: INTERNAL MEDICINE

## 2024-04-23 PROCEDURE — G8420 CALC BMI NORM PARAMETERS: HCPCS | Performed by: INTERNAL MEDICINE

## 2024-04-23 PROCEDURE — G8427 DOCREV CUR MEDS BY ELIG CLIN: HCPCS | Performed by: INTERNAL MEDICINE

## 2024-04-23 PROCEDURE — 1123F ACP DISCUSS/DSCN MKR DOCD: CPT | Performed by: INTERNAL MEDICINE

## 2024-04-23 PROCEDURE — 1090F PRES/ABSN URINE INCON ASSESS: CPT | Performed by: INTERNAL MEDICINE

## 2024-04-23 PROCEDURE — 1036F TOBACCO NON-USER: CPT | Performed by: INTERNAL MEDICINE

## 2024-04-23 ASSESSMENT — PATIENT HEALTH QUESTIONNAIRE - PHQ9
SUM OF ALL RESPONSES TO PHQ9 QUESTIONS 1 & 2: 0
SUM OF ALL RESPONSES TO PHQ QUESTIONS 1-9: 0
2. FEELING DOWN, DEPRESSED OR HOPELESS: NOT AT ALL
SUM OF ALL RESPONSES TO PHQ QUESTIONS 1-9: 0
1. LITTLE INTEREST OR PLEASURE IN DOING THINGS: NOT AT ALL

## 2024-04-23 NOTE — PROGRESS NOTES
\"Have you been to the ER, urgent care clinic since your last visit?  Hospitalized since your last visit?\"    NO    “Have you seen or consulted any other health care providers outside of [facility] since your last visit?”    NO            Click Here for Release of Records Request

## 2024-04-24 LAB
ALBUMIN SERPL-MCNC: 4 G/DL (ref 3.5–5)
ALBUMIN/GLOB SERPL: 1.3 (ref 1.1–2.2)
ALP SERPL-CCNC: 84 U/L (ref 45–117)
ALT SERPL-CCNC: 20 U/L (ref 12–78)
ANION GAP SERPL CALC-SCNC: 7 MMOL/L (ref 5–15)
AST SERPL-CCNC: 21 U/L (ref 15–37)
BILIRUB SERPL-MCNC: 0.5 MG/DL (ref 0.2–1)
BUN SERPL-MCNC: 20 MG/DL (ref 6–20)
BUN/CREAT SERPL: 20 (ref 12–20)
CALCIUM SERPL-MCNC: 10 MG/DL (ref 8.5–10.1)
CHLORIDE SERPL-SCNC: 100 MMOL/L (ref 97–108)
CHOLEST SERPL-MCNC: 136 MG/DL
CO2 SERPL-SCNC: 32 MMOL/L (ref 21–32)
CREAT SERPL-MCNC: 0.98 MG/DL (ref 0.55–1.02)
CREAT UR-MCNC: 60.7 MG/DL
ERYTHROCYTE [DISTWIDTH] IN BLOOD BY AUTOMATED COUNT: 13.8 % (ref 11.5–14.5)
EST. AVERAGE GLUCOSE BLD GHB EST-MCNC: 114 MG/DL
GLOBULIN SER CALC-MCNC: 3.1 G/DL (ref 2–4)
GLUCOSE SERPL-MCNC: 100 MG/DL (ref 65–100)
HBA1C MFR BLD: 5.6 % (ref 4–5.6)
HCT VFR BLD AUTO: 37.6 % (ref 35–47)
HDLC SERPL-MCNC: 66 MG/DL
HDLC SERPL: 2.1 (ref 0–5)
HGB BLD-MCNC: 12.6 G/DL (ref 11.5–16)
LDLC SERPL CALC-MCNC: 49.6 MG/DL (ref 0–100)
MCH RBC QN AUTO: 30.6 PG (ref 26–34)
MCHC RBC AUTO-ENTMCNC: 33.5 G/DL (ref 30–36.5)
MCV RBC AUTO: 91.3 FL (ref 80–99)
MICROALBUMIN UR-MCNC: 0.62 MG/DL
MICROALBUMIN/CREAT UR-RTO: 10 MG/G (ref 0–30)
NRBC # BLD: 0 K/UL (ref 0–0.01)
NRBC BLD-RTO: 0 PER 100 WBC
PLATELET # BLD AUTO: 231 K/UL (ref 150–400)
PMV BLD AUTO: 12 FL (ref 8.9–12.9)
POTASSIUM SERPL-SCNC: 3.6 MMOL/L (ref 3.5–5.1)
PROT SERPL-MCNC: 7.1 G/DL (ref 6.4–8.2)
RBC # BLD AUTO: 4.12 M/UL (ref 3.8–5.2)
SODIUM SERPL-SCNC: 139 MMOL/L (ref 136–145)
TRIGL SERPL-MCNC: 102 MG/DL
TSH SERPL DL<=0.05 MIU/L-ACNC: 2.68 UIU/ML (ref 0.36–3.74)
VLDLC SERPL CALC-MCNC: 20.4 MG/DL
WBC # BLD AUTO: 9.4 K/UL (ref 3.6–11)

## 2024-06-11 RX ORDER — AMLODIPINE BESYLATE 5 MG/1
10 TABLET ORAL DAILY
Qty: 180 TABLET | Refills: 0 | Status: SHIPPED | OUTPATIENT
Start: 2024-06-11

## 2024-06-11 RX ORDER — METOPROLOL TARTRATE 50 MG/1
50 TABLET, FILM COATED ORAL 2 TIMES DAILY
Qty: 180 TABLET | Refills: 1 | Status: SHIPPED | OUTPATIENT
Start: 2024-06-11

## 2024-07-01 RX ORDER — CHLORTHALIDONE 25 MG/1
25 TABLET ORAL DAILY
Qty: 90 TABLET | Refills: 0 | Status: SHIPPED | OUTPATIENT
Start: 2024-07-01

## 2024-07-16 RX ORDER — ALLOPURINOL 100 MG/1
100 TABLET ORAL DAILY
Qty: 90 TABLET | Refills: 1 | Status: SHIPPED | OUTPATIENT
Start: 2024-07-16

## 2024-07-23 RX ORDER — ATORVASTATIN CALCIUM 20 MG/1
20 TABLET, FILM COATED ORAL DAILY
Qty: 90 TABLET | Refills: 1 | Status: SHIPPED | OUTPATIENT
Start: 2024-07-23

## 2024-08-20 NOTE — PROGRESS NOTES
HISTORY OF PRESENT ILLNESS  Antonella Bojorquez is a 89 y.o. female.  HPI  Last here 4/23/24. Pt is here to f/u on chronic conditions.     She has a history of hypertension  BP today is 152/68 will repeat   BP at home: 128/59 120/60 121/53 118/57 117/56 122/57 121/53 127/56 124/54   Continues on chlorthalidone 25mg daily, metoprolol 50mg BID, and Norvasc 5 mg  No longer on lotrel 5-20mg daily  She has some wch     Patient is diabetic  No home BS readings for review, does not check at home  Taking metformin 500mg BID      Wt today is 148 lbs, down 2 lbs since lov   Wt is in the nl range     Reviewed labs   Ordered labs     Recall echo 9/13/21:  LV: Estimated LVEF is 60 - 65%. Normal systolic function (ejection fraction normal). Small left ventricle. Moderate septal wall hypertrophy. Mild posterior wall hypertrophy. Wall motion: normal. Mild (grade 1) left ventricular diastolic dysfunction.  MV: Mild mitral valve regurgitation is present.  TV: Mild tricuspid valve regurgitation is present.     Pt follows with Dr. Candelaria (cardio) for CAD  Last visit 11/23, f/u 11/22/24  Continues on ASA 81mg  No longer on plavix      Pt previously followed with Dr. Leyla Alcala (derm) for eczema  Pt will only f/u with this physician prn    Eczema has been good, has not been using cream and avoiding triggers, uses lotion now which helps      Previously pt c/o BL shoulder pain on and off    We have discussed this is likely arthritis, tylenol is fine      Continues on lipitor 20mg daily for cholesterol, reports compliance  Recall could not tolerate crestor     Taking Klor-Con 20 M EQ      Continues allopurinol 100mg for gout prevention -- no recent flares 8/24  Uric acid levels were nl on last labs   Discussed continue the medication she had frequent flares without it     Pt lives w/ her   She is going to [place] to visit her great-great-grandchildren in the winter     ACP not on file. SDMs are her  (Bharat Bojorquez), son and

## 2024-08-28 ENCOUNTER — OFFICE VISIT (OUTPATIENT)
Age: 89
End: 2024-08-28
Payer: MEDICARE

## 2024-08-28 VITALS
BODY MASS INDEX: 23.78 KG/M2 | RESPIRATION RATE: 18 BRPM | HEIGHT: 66 IN | WEIGHT: 148 LBS | TEMPERATURE: 97.5 F | OXYGEN SATURATION: 99 % | HEART RATE: 66 BPM | DIASTOLIC BLOOD PRESSURE: 63 MMHG | SYSTOLIC BLOOD PRESSURE: 127 MMHG

## 2024-08-28 DIAGNOSIS — M1A.09X0 IDIOPATHIC CHRONIC GOUT OF MULTIPLE SITES WITHOUT TOPHUS: ICD-10-CM

## 2024-08-28 DIAGNOSIS — E11.9 DIABETES MELLITUS WITHOUT COMPLICATION (HCC): ICD-10-CM

## 2024-08-28 DIAGNOSIS — Z23 NEED FOR PROPHYLACTIC VACCINATION AGAINST STREPTOCOCCUS PNEUMONIAE (PNEUMOCOCCUS): ICD-10-CM

## 2024-08-28 DIAGNOSIS — E53.8 B12 DEFICIENCY: ICD-10-CM

## 2024-08-28 DIAGNOSIS — I25.10 CORONARY ARTERY DISEASE INVOLVING NATIVE CORONARY ARTERY OF NATIVE HEART WITHOUT ANGINA PECTORIS: ICD-10-CM

## 2024-08-28 DIAGNOSIS — I10 PRIMARY HYPERTENSION: ICD-10-CM

## 2024-08-28 DIAGNOSIS — E87.6 HYPOKALEMIA: ICD-10-CM

## 2024-08-28 DIAGNOSIS — I10 PRIMARY HYPERTENSION: Primary | ICD-10-CM

## 2024-08-28 DIAGNOSIS — E78.2 MIXED HYPERLIPIDEMIA: ICD-10-CM

## 2024-08-28 LAB
ANION GAP SERPL CALC-SCNC: 4 MMOL/L (ref 5–15)
BUN SERPL-MCNC: 21 MG/DL (ref 6–20)
BUN/CREAT SERPL: 24 (ref 12–20)
CALCIUM SERPL-MCNC: 10.4 MG/DL (ref 8.5–10.1)
CHLORIDE SERPL-SCNC: 98 MMOL/L (ref 97–108)
CO2 SERPL-SCNC: 35 MMOL/L (ref 21–32)
CREAT SERPL-MCNC: 0.86 MG/DL (ref 0.55–1.02)
EST. AVERAGE GLUCOSE BLD GHB EST-MCNC: 128 MG/DL
GLUCOSE SERPL-MCNC: 113 MG/DL (ref 65–100)
HBA1C MFR BLD: 6.1 % (ref 4–5.6)
POTASSIUM SERPL-SCNC: 3.9 MMOL/L (ref 3.5–5.1)
SODIUM SERPL-SCNC: 137 MMOL/L (ref 136–145)
URATE SERPL-MCNC: 5.4 MG/DL (ref 2.6–6)
VIT B12 SERPL-MCNC: 305 PG/ML (ref 193–986)

## 2024-08-28 PROCEDURE — 99214 OFFICE O/P EST MOD 30 MIN: CPT | Performed by: INTERNAL MEDICINE

## 2024-08-28 PROCEDURE — 1123F ACP DISCUSS/DSCN MKR DOCD: CPT | Performed by: INTERNAL MEDICINE

## 2024-08-28 PROCEDURE — PBSHW PNEUMOCOCCAL, PCV20, PREVNAR 20, (AGE 6W+), IM, PF: Performed by: INTERNAL MEDICINE

## 2024-08-28 PROCEDURE — 1036F TOBACCO NON-USER: CPT | Performed by: INTERNAL MEDICINE

## 2024-08-28 PROCEDURE — 3044F HG A1C LEVEL LT 7.0%: CPT | Performed by: INTERNAL MEDICINE

## 2024-08-28 PROCEDURE — 90677 PCV20 VACCINE IM: CPT | Performed by: INTERNAL MEDICINE

## 2024-08-28 PROCEDURE — G8420 CALC BMI NORM PARAMETERS: HCPCS | Performed by: INTERNAL MEDICINE

## 2024-08-28 PROCEDURE — G8427 DOCREV CUR MEDS BY ELIG CLIN: HCPCS | Performed by: INTERNAL MEDICINE

## 2024-08-28 PROCEDURE — 1090F PRES/ABSN URINE INCON ASSESS: CPT | Performed by: INTERNAL MEDICINE

## 2024-08-28 SDOH — ECONOMIC STABILITY: FOOD INSECURITY: WITHIN THE PAST 12 MONTHS, YOU WORRIED THAT YOUR FOOD WOULD RUN OUT BEFORE YOU GOT MONEY TO BUY MORE.: NEVER TRUE

## 2024-08-28 SDOH — ECONOMIC STABILITY: INCOME INSECURITY: HOW HARD IS IT FOR YOU TO PAY FOR THE VERY BASICS LIKE FOOD, HOUSING, MEDICAL CARE, AND HEATING?: NOT HARD AT ALL

## 2024-08-28 SDOH — ECONOMIC STABILITY: FOOD INSECURITY: WITHIN THE PAST 12 MONTHS, THE FOOD YOU BOUGHT JUST DIDN'T LAST AND YOU DIDN'T HAVE MONEY TO GET MORE.: NEVER TRUE

## 2024-08-28 ASSESSMENT — PATIENT HEALTH QUESTIONNAIRE - PHQ9
SUM OF ALL RESPONSES TO PHQ QUESTIONS 1-9: 0
SUM OF ALL RESPONSES TO PHQ9 QUESTIONS 1 & 2: 0
SUM OF ALL RESPONSES TO PHQ QUESTIONS 1-9: 0
SUM OF ALL RESPONSES TO PHQ QUESTIONS 1-9: 0
2. FEELING DOWN, DEPRESSED OR HOPELESS: NOT AT ALL
1. LITTLE INTEREST OR PLEASURE IN DOING THINGS: NOT AT ALL
SUM OF ALL RESPONSES TO PHQ QUESTIONS 1-9: 0

## 2024-09-23 RX ORDER — CHLORTHALIDONE 25 MG/1
25 TABLET ORAL DAILY
Qty: 90 TABLET | Refills: 0 | Status: SHIPPED | OUTPATIENT
Start: 2024-09-23

## 2024-09-30 RX ORDER — AMLODIPINE BESYLATE 5 MG/1
10 TABLET ORAL DAILY
Qty: 180 TABLET | Refills: 0 | Status: SHIPPED | OUTPATIENT
Start: 2024-09-30

## 2024-11-29 DIAGNOSIS — I10 PRIMARY HYPERTENSION: ICD-10-CM

## 2024-11-29 RX ORDER — POTASSIUM CHLORIDE 1500 MG/1
20 TABLET, EXTENDED RELEASE ORAL DAILY
Qty: 90 TABLET | Refills: 1 | Status: SHIPPED | OUTPATIENT
Start: 2024-11-29

## 2024-11-29 RX ORDER — POTASSIUM CHLORIDE 1500 MG/1
20 TABLET, EXTENDED RELEASE ORAL DAILY
Qty: 90 TABLET | Refills: 1 | OUTPATIENT
Start: 2024-11-29

## 2024-12-10 RX ORDER — METOPROLOL TARTRATE 50 MG
50 TABLET ORAL 2 TIMES DAILY
Qty: 180 TABLET | Refills: 1 | Status: SHIPPED | OUTPATIENT
Start: 2024-12-10

## 2024-12-10 RX ORDER — CHLORTHALIDONE 25 MG/1
25 TABLET ORAL DAILY
Qty: 30 TABLET | Refills: 0 | Status: SHIPPED | OUTPATIENT
Start: 2024-12-10

## 2024-12-10 RX ORDER — CHLORTHALIDONE 25 MG/1
25 TABLET ORAL DAILY
Qty: 90 TABLET | OUTPATIENT
Start: 2024-12-10

## 2024-12-18 ENCOUNTER — OFFICE VISIT (OUTPATIENT)
Age: 89
End: 2024-12-18
Payer: MEDICARE

## 2024-12-18 VITALS
TEMPERATURE: 98.1 F | SYSTOLIC BLOOD PRESSURE: 119 MMHG | HEIGHT: 66 IN | BODY MASS INDEX: 24.3 KG/M2 | RESPIRATION RATE: 18 BRPM | OXYGEN SATURATION: 98 % | HEART RATE: 65 BPM | WEIGHT: 151.2 LBS | DIASTOLIC BLOOD PRESSURE: 56 MMHG

## 2024-12-18 DIAGNOSIS — E78.2 MIXED HYPERLIPIDEMIA: ICD-10-CM

## 2024-12-18 DIAGNOSIS — E87.6 HYPOKALEMIA: ICD-10-CM

## 2024-12-18 DIAGNOSIS — Z00.00 MEDICARE ANNUAL WELLNESS VISIT, SUBSEQUENT: ICD-10-CM

## 2024-12-18 DIAGNOSIS — E11.9 DIABETES MELLITUS WITHOUT COMPLICATION (HCC): ICD-10-CM

## 2024-12-18 DIAGNOSIS — I10 PRIMARY HYPERTENSION: Primary | ICD-10-CM

## 2024-12-18 DIAGNOSIS — I25.10 CORONARY ARTERY DISEASE INVOLVING NATIVE CORONARY ARTERY OF NATIVE HEART WITHOUT ANGINA PECTORIS: ICD-10-CM

## 2024-12-18 DIAGNOSIS — M1A.09X0 IDIOPATHIC CHRONIC GOUT OF MULTIPLE SITES WITHOUT TOPHUS: ICD-10-CM

## 2024-12-18 PROCEDURE — 99214 OFFICE O/P EST MOD 30 MIN: CPT | Performed by: INTERNAL MEDICINE

## 2024-12-18 ASSESSMENT — PATIENT HEALTH QUESTIONNAIRE - PHQ9
1. LITTLE INTEREST OR PLEASURE IN DOING THINGS: NOT AT ALL
SUM OF ALL RESPONSES TO PHQ QUESTIONS 1-9: 0
SUM OF ALL RESPONSES TO PHQ QUESTIONS 1-9: 0
SUM OF ALL RESPONSES TO PHQ9 QUESTIONS 1 & 2: 0
SUM OF ALL RESPONSES TO PHQ QUESTIONS 1-9: 0
2. FEELING DOWN, DEPRESSED OR HOPELESS: NOT AT ALL
SUM OF ALL RESPONSES TO PHQ QUESTIONS 1-9: 0

## 2024-12-18 ASSESSMENT — LIFESTYLE VARIABLES
HOW MANY STANDARD DRINKS CONTAINING ALCOHOL DO YOU HAVE ON A TYPICAL DAY: PATIENT DOES NOT DRINK
HOW OFTEN DO YOU HAVE A DRINK CONTAINING ALCOHOL: NEVER

## 2024-12-18 NOTE — PROGRESS NOTES
Medicare Annual Wellness Visit    Antonella Bojorquez is here for Hypertension, Diabetes, and Medicare AWV    Assessment & Plan   Primary hypertension  -     Comprehensive Metabolic Panel; Future  -     Hemoglobin A1C; Future  Coronary artery disease involving native coronary artery of native heart without angina pectoris  Diabetes mellitus without complication (HCC)  -     Comprehensive Metabolic Panel; Future  -     Hemoglobin A1C; Future  -      DIABETES FOOT EXAM  Mixed hyperlipidemia  Hypokalemia  Idiopathic chronic gout of multiple sites without tophus  Medicare annual wellness visit, subsequent     Recommendations for Preventive Services Due: see orders and patient instructions/AVS.  Recommended screening schedule for the next 5-10 years is provided to the patient in written form: see Patient Instructions/AVS.     Return in about 4 months (around 4/18/2025).     Subjective       Patient's complete Health Risk Assessment and screening values have been reviewed and are found in Flowsheets. The following problems were reviewed today and where indicated follow up appointments were made and/or referrals ordered.    No Positive Risk Factors identified today.                                    Objective   Vitals:    12/18/24 1126   BP: (!) 119/56   Site: Left Upper Arm   Position: Sitting   Pulse: 65   Resp: 18   Temp: 98.1 °F (36.7 °C)   SpO2: 98%   Weight: 68.6 kg (151 lb 3.2 oz)   Height: 1.676 m (5' 6\")      Body mass index is 24.4 kg/m².                    Allergies   Allergen Reactions    Rosuvastatin      Other reaction(s): Unknown (comments)     Prior to Visit Medications    Medication Sig Taking? Authorizing Provider   metoprolol tartrate (LOPRESSOR) 50 MG tablet TAKE 1 TABLET BY MOUTH TWICE DAILY Yes Ashleigh Pulido MD   chlorthalidone (HYGROTON) 25 MG tablet TAKE 1 TABLET BY MOUTH DAILY Yes Ashleigh Pulido MD   potassium chloride (KLOR-CON M) 20 MEQ extended release tablet TAKE 1 TABLET BY MOUTH DAILY 
\"Have you been to the ER, urgent care clinic since your last visit?  Hospitalized since your last visit?\"    NO    “Have you seen or consulted any other health care providers outside our system since your last visit?”    NO           
diaphoretic.   HENT:      Head: Normocephalic and atraumatic.   Eyes:      General:         Right eye: No discharge.         Left eye: No discharge.      Extraocular Movements: Extraocular movements intact.   Neck:      Vascular: No carotid bruit.   Cardiovascular:      Rate and Rhythm: Normal rate and regular rhythm.      Heart sounds: Normal heart sounds. No murmur heard.  Pulmonary:      Effort: Pulmonary effort is normal. No respiratory distress.      Breath sounds: Normal breath sounds. No wheezing.   Musculoskeletal:         General: No swelling, tenderness or deformity.      Cervical back: Normal range of motion and neck supple. No tenderness.      Right lower leg: No edema.      Left lower leg: No edema.      Right foot: Bunion present.      Left foot: Bunion present.   Feet:      Right foot:      Skin integrity: Callus present.      Left foot:      Skin integrity: Callus present.      Comments: Sensory exam of the foot is normal, tested with the monofilament. Good pulses, no lesions or ulcers, good peripheral pulses.    Lymphadenopathy:      Cervical: No cervical adenopathy.   Skin:     General: Skin is warm and dry.      Findings: No erythema.   Neurological:      General: No focal deficit present.      Mental Status: She is oriented to person, place, and time. Mental status is at baseline.      Gait: Gait normal.   Psychiatric:         Mood and Affect: Mood normal.           ASSESSMENT and PLAN   Diagnosis Orders   1. Primary hypertension  Comprehensive Metabolic Panel    Hemoglobin A1C     Continues on chlorthalidone 25mg daily, metoprolol 50mg BID, and Norvasc 5 mg    Controlled current regimen continue to change dose check metabolic panel for K creatinine sodium levels   2. Coronary artery disease involving native coronary artery of native heart without angina pectoris     Medically managed up-to-date with cardiology   3. Diabetes mellitus without complication (HCC)  Comprehensive Metabolic Panel

## 2024-12-19 LAB
ALBUMIN SERPL-MCNC: 3.7 G/DL (ref 3.5–5)
ALBUMIN/GLOB SERPL: 1.2 (ref 1.1–2.2)
ALP SERPL-CCNC: 101 U/L (ref 45–117)
ALT SERPL-CCNC: 18 U/L (ref 12–78)
ANION GAP SERPL CALC-SCNC: 5 MMOL/L (ref 2–12)
AST SERPL-CCNC: 28 U/L (ref 15–37)
BILIRUB SERPL-MCNC: 0.4 MG/DL (ref 0.2–1)
BUN SERPL-MCNC: 26 MG/DL (ref 6–20)
BUN/CREAT SERPL: 31 (ref 12–20)
CALCIUM SERPL-MCNC: 10.1 MG/DL (ref 8.5–10.1)
CHLORIDE SERPL-SCNC: 98 MMOL/L (ref 97–108)
CO2 SERPL-SCNC: 32 MMOL/L (ref 21–32)
CREAT SERPL-MCNC: 0.83 MG/DL (ref 0.55–1.02)
EST. AVERAGE GLUCOSE BLD GHB EST-MCNC: 117 MG/DL
GLOBULIN SER CALC-MCNC: 3.2 G/DL (ref 2–4)
GLUCOSE SERPL-MCNC: 108 MG/DL (ref 65–100)
HBA1C MFR BLD: 5.7 % (ref 4–5.6)
POTASSIUM SERPL-SCNC: 3.7 MMOL/L (ref 3.5–5.1)
PROT SERPL-MCNC: 6.9 G/DL (ref 6.4–8.2)
SODIUM SERPL-SCNC: 135 MMOL/L (ref 136–145)

## 2025-01-13 RX ORDER — AMLODIPINE BESYLATE 5 MG/1
10 TABLET ORAL DAILY
Qty: 180 TABLET | Refills: 0 | Status: SHIPPED | OUTPATIENT
Start: 2025-01-13

## 2025-01-13 RX ORDER — ALLOPURINOL 100 MG/1
100 TABLET ORAL DAILY
Qty: 90 TABLET | Refills: 1 | Status: SHIPPED | OUTPATIENT
Start: 2025-01-13

## 2025-01-13 RX ORDER — CHLORTHALIDONE 25 MG/1
25 TABLET ORAL DAILY
Qty: 30 TABLET | Refills: 0 | Status: SHIPPED | OUTPATIENT
Start: 2025-01-13 | End: 2025-01-13

## 2025-01-13 RX ORDER — CHLORTHALIDONE 25 MG/1
25 TABLET ORAL DAILY
Qty: 90 TABLET | Refills: 0 | Status: SHIPPED | OUTPATIENT
Start: 2025-01-13

## 2025-01-27 RX ORDER — ATORVASTATIN CALCIUM 20 MG/1
20 TABLET, FILM COATED ORAL DAILY
Qty: 90 TABLET | Refills: 1 | Status: SHIPPED | OUTPATIENT
Start: 2025-01-27

## 2025-04-14 RX ORDER — AMLODIPINE BESYLATE 5 MG/1
10 TABLET ORAL DAILY
Qty: 180 TABLET | Refills: 0 | Status: SHIPPED | OUTPATIENT
Start: 2025-04-14

## 2025-04-14 RX ORDER — CHLORTHALIDONE 25 MG/1
25 TABLET ORAL DAILY
Qty: 30 TABLET | Refills: 0 | Status: SHIPPED | OUTPATIENT
Start: 2025-04-14

## 2025-04-22 NOTE — PROGRESS NOTES
HISTORY OF PRESENT ILLNESS  Antonella Bojorquez is a 89 y.o. female.  HPI  Last here 12/18/24. Pt is here to f/u on chronic conditions.     She has a history of hypertension  BP today is 136/68  BP at home: 117/67 130/54 121/56 122/60 120/62 123/63 120/51 118/58 114/57  Continues on chlorthalidone 25mg daily, metoprolol 50mg BID, and Norvasc 5 mg  No longer on lotrel 5-20mg daily  She has some wch     Patient is diabetic  No home BS readings for review, does not check at home  Taking metformin 500mg BID      Wt today is 147 lbs, down 4 lbs since lov  Wt is in the nl range     Reviewed labs  Ordered labs     Recall echo 9/13/21:  LV: Estimated LVEF is 60 - 65%. Normal systolic function (ejection fraction normal). Small left ventricle. Moderate septal wall hypertrophy. Mild posterior wall hypertrophy. Wall motion: normal. Mild (grade 1) left ventricular diastolic dysfunction.  MV: Mild mitral valve regurgitation is present.  TV: Mild tricuspid valve regurgitation is present.     Pt follows with Dr. Candelaria (cardio) for CAD  Last visit 11/22/24     Continues on ASA 81mg  No longer on plavix     Pt previously followed with Dr. Leyla Alcala (derm) for eczema  Pt will only f/u with this physician prn    Eczema has been good, has not been using cream and avoiding triggers, uses lotion now which helps      Previously pt c/o BL shoulder pain on and off    We have discussed this is likely arthritis, tylenol is fine      Hyperlipidemia   Continues on lipitor 20mg daily for cholesterol, reports compliance  Recall could not tolerate crestor     Hypokalemia   Taking Klor-Con 20 MEQ      Discussed taking B12 OTC     Hx of gout   Continues allopurinol 100mg for gout prevention -- no recent flares 4/25  Uric acid levels were nl on last labs   She had frequent flares without allopurinol     Pt lives w/ her      ACP not on file. SDMs are her  (Bharat Bojorquez), son and daughter-in-law.  Provided information in past

## 2025-04-29 ENCOUNTER — OFFICE VISIT (OUTPATIENT)
Age: 89
End: 2025-04-29
Payer: MEDICARE

## 2025-04-29 VITALS
HEART RATE: 64 BPM | SYSTOLIC BLOOD PRESSURE: 136 MMHG | DIASTOLIC BLOOD PRESSURE: 68 MMHG | HEIGHT: 66 IN | WEIGHT: 147 LBS | BODY MASS INDEX: 23.63 KG/M2 | OXYGEN SATURATION: 100 % | TEMPERATURE: 96.9 F

## 2025-04-29 DIAGNOSIS — I25.10 CORONARY ARTERY DISEASE INVOLVING NATIVE CORONARY ARTERY OF NATIVE HEART WITHOUT ANGINA PECTORIS: ICD-10-CM

## 2025-04-29 DIAGNOSIS — M1A.09X0 IDIOPATHIC CHRONIC GOUT OF MULTIPLE SITES WITHOUT TOPHUS: ICD-10-CM

## 2025-04-29 DIAGNOSIS — E11.9 DIABETES MELLITUS WITHOUT COMPLICATION (HCC): ICD-10-CM

## 2025-04-29 DIAGNOSIS — E87.6 HYPOKALEMIA: ICD-10-CM

## 2025-04-29 DIAGNOSIS — I10 PRIMARY HYPERTENSION: Primary | ICD-10-CM

## 2025-04-29 DIAGNOSIS — E78.2 MIXED HYPERLIPIDEMIA: ICD-10-CM

## 2025-04-29 LAB
ANION GAP SERPL CALC-SCNC: 5 MMOL/L (ref 2–12)
BUN SERPL-MCNC: 28 MG/DL (ref 6–20)
BUN/CREAT SERPL: 30 (ref 12–20)
CALCIUM SERPL-MCNC: 10.3 MG/DL (ref 8.5–10.1)
CHLORIDE SERPL-SCNC: 100 MMOL/L (ref 97–108)
CHOLEST SERPL-MCNC: 146 MG/DL
CO2 SERPL-SCNC: 32 MMOL/L (ref 21–32)
CREAT SERPL-MCNC: 0.93 MG/DL (ref 0.55–1.02)
ERYTHROCYTE [DISTWIDTH] IN BLOOD BY AUTOMATED COUNT: 13.7 % (ref 11.5–14.5)
EST. AVERAGE GLUCOSE BLD GHB EST-MCNC: 114 MG/DL
GLUCOSE SERPL-MCNC: 121 MG/DL (ref 65–100)
HBA1C MFR BLD: 5.6 % (ref 4–5.6)
HCT VFR BLD AUTO: 37.7 % (ref 35–47)
HDLC SERPL-MCNC: 67 MG/DL
HDLC SERPL: 2.2 (ref 0–5)
HGB BLD-MCNC: 12.4 G/DL (ref 11.5–16)
LDLC SERPL CALC-MCNC: 57.2 MG/DL (ref 0–100)
MCH RBC QN AUTO: 30.9 PG (ref 26–34)
MCHC RBC AUTO-ENTMCNC: 32.9 G/DL (ref 30–36.5)
MCV RBC AUTO: 94 FL (ref 80–99)
NRBC # BLD: 0 K/UL (ref 0–0.01)
NRBC BLD-RTO: 0 PER 100 WBC
PLATELET # BLD AUTO: 256 K/UL (ref 150–400)
PMV BLD AUTO: 12 FL (ref 8.9–12.9)
POTASSIUM SERPL-SCNC: 3.6 MMOL/L (ref 3.5–5.1)
RBC # BLD AUTO: 4.01 M/UL (ref 3.8–5.2)
SODIUM SERPL-SCNC: 137 MMOL/L (ref 136–145)
TRIGL SERPL-MCNC: 109 MG/DL
TSH SERPL DL<=0.05 MIU/L-ACNC: 2.66 UIU/ML (ref 0.36–3.74)
VLDLC SERPL CALC-MCNC: 21.8 MG/DL
WBC # BLD AUTO: 9.3 K/UL (ref 3.6–11)

## 2025-04-29 PROCEDURE — 1036F TOBACCO NON-USER: CPT | Performed by: INTERNAL MEDICINE

## 2025-04-29 PROCEDURE — 1159F MED LIST DOCD IN RCRD: CPT | Performed by: INTERNAL MEDICINE

## 2025-04-29 PROCEDURE — 1160F RVW MEDS BY RX/DR IN RCRD: CPT | Performed by: INTERNAL MEDICINE

## 2025-04-29 PROCEDURE — 99214 OFFICE O/P EST MOD 30 MIN: CPT | Performed by: INTERNAL MEDICINE

## 2025-04-29 PROCEDURE — G8420 CALC BMI NORM PARAMETERS: HCPCS | Performed by: INTERNAL MEDICINE

## 2025-04-29 PROCEDURE — 1123F ACP DISCUSS/DSCN MKR DOCD: CPT | Performed by: INTERNAL MEDICINE

## 2025-04-29 PROCEDURE — 1090F PRES/ABSN URINE INCON ASSESS: CPT | Performed by: INTERNAL MEDICINE

## 2025-04-29 PROCEDURE — G8427 DOCREV CUR MEDS BY ELIG CLIN: HCPCS | Performed by: INTERNAL MEDICINE

## 2025-04-29 SDOH — ECONOMIC STABILITY: FOOD INSECURITY: WITHIN THE PAST 12 MONTHS, YOU WORRIED THAT YOUR FOOD WOULD RUN OUT BEFORE YOU GOT MONEY TO BUY MORE.: NEVER TRUE

## 2025-04-29 SDOH — ECONOMIC STABILITY: FOOD INSECURITY: WITHIN THE PAST 12 MONTHS, THE FOOD YOU BOUGHT JUST DIDN'T LAST AND YOU DIDN'T HAVE MONEY TO GET MORE.: NEVER TRUE

## 2025-04-29 ASSESSMENT — PATIENT HEALTH QUESTIONNAIRE - PHQ9
SUM OF ALL RESPONSES TO PHQ QUESTIONS 1-9: 0
2. FEELING DOWN, DEPRESSED OR HOPELESS: NOT AT ALL
SUM OF ALL RESPONSES TO PHQ QUESTIONS 1-9: 0
1. LITTLE INTEREST OR PLEASURE IN DOING THINGS: NOT AT ALL
SUM OF ALL RESPONSES TO PHQ QUESTIONS 1-9: 0
SUM OF ALL RESPONSES TO PHQ QUESTIONS 1-9: 0

## 2025-04-30 ENCOUNTER — RESULTS FOLLOW-UP (OUTPATIENT)
Age: 89
End: 2025-04-30

## 2025-05-29 DIAGNOSIS — I10 PRIMARY HYPERTENSION: ICD-10-CM

## 2025-05-29 RX ORDER — POTASSIUM CHLORIDE 1500 MG/1
20 TABLET, EXTENDED RELEASE ORAL DAILY
Qty: 90 TABLET | Refills: 1 | Status: SHIPPED | OUTPATIENT
Start: 2025-05-29

## 2025-05-29 RX ORDER — ALLOPURINOL 100 MG/1
100 TABLET ORAL DAILY
Qty: 90 TABLET | Refills: 1 | Status: SHIPPED | OUTPATIENT
Start: 2025-05-29

## 2025-06-01 RX ORDER — CHLORTHALIDONE 25 MG/1
25 TABLET ORAL DAILY
Qty: 90 TABLET | Refills: 0 | Status: SHIPPED | OUTPATIENT
Start: 2025-06-01

## 2025-06-18 RX ORDER — METOPROLOL TARTRATE 50 MG
50 TABLET ORAL 2 TIMES DAILY
Qty: 180 TABLET | Refills: 1 | Status: SHIPPED | OUTPATIENT
Start: 2025-06-18

## 2025-07-08 RX ORDER — AMLODIPINE BESYLATE 5 MG/1
10 TABLET ORAL DAILY
Qty: 180 TABLET | Refills: 0 | Status: SHIPPED | OUTPATIENT
Start: 2025-07-08

## 2025-07-25 RX ORDER — ATORVASTATIN CALCIUM 20 MG/1
20 TABLET, FILM COATED ORAL DAILY
Qty: 90 TABLET | Refills: 1 | Status: SHIPPED | OUTPATIENT
Start: 2025-07-25

## 2025-08-12 ENCOUNTER — OFFICE VISIT (OUTPATIENT)
Age: 89
End: 2025-08-12
Payer: MEDICARE

## 2025-08-12 VITALS
WEIGHT: 147.4 LBS | HEART RATE: 98 BPM | SYSTOLIC BLOOD PRESSURE: 108 MMHG | TEMPERATURE: 98.4 F | DIASTOLIC BLOOD PRESSURE: 72 MMHG | BODY MASS INDEX: 23.69 KG/M2 | RESPIRATION RATE: 15 BRPM | HEIGHT: 66 IN | OXYGEN SATURATION: 97 %

## 2025-08-12 DIAGNOSIS — I25.10 CORONARY ARTERY DISEASE INVOLVING NATIVE CORONARY ARTERY OF NATIVE HEART WITHOUT ANGINA PECTORIS: ICD-10-CM

## 2025-08-12 DIAGNOSIS — E87.6 HYPOKALEMIA: ICD-10-CM

## 2025-08-12 DIAGNOSIS — M1A.09X0 IDIOPATHIC CHRONIC GOUT OF MULTIPLE SITES WITHOUT TOPHUS: ICD-10-CM

## 2025-08-12 DIAGNOSIS — I10 PRIMARY HYPERTENSION: Primary | ICD-10-CM

## 2025-08-12 DIAGNOSIS — E11.9 DIABETES MELLITUS WITHOUT COMPLICATION (HCC): ICD-10-CM

## 2025-08-12 DIAGNOSIS — E78.2 MIXED HYPERLIPIDEMIA: ICD-10-CM

## 2025-08-12 PROCEDURE — 99214 OFFICE O/P EST MOD 30 MIN: CPT | Performed by: INTERNAL MEDICINE

## 2025-08-12 PROCEDURE — 1123F ACP DISCUSS/DSCN MKR DOCD: CPT | Performed by: INTERNAL MEDICINE

## 2025-08-12 PROCEDURE — 1090F PRES/ABSN URINE INCON ASSESS: CPT | Performed by: INTERNAL MEDICINE

## 2025-08-12 PROCEDURE — 1159F MED LIST DOCD IN RCRD: CPT | Performed by: INTERNAL MEDICINE

## 2025-08-12 PROCEDURE — G8420 CALC BMI NORM PARAMETERS: HCPCS | Performed by: INTERNAL MEDICINE

## 2025-08-12 PROCEDURE — 3044F HG A1C LEVEL LT 7.0%: CPT | Performed by: INTERNAL MEDICINE

## 2025-08-12 PROCEDURE — 1036F TOBACCO NON-USER: CPT | Performed by: INTERNAL MEDICINE

## 2025-08-12 PROCEDURE — G8427 DOCREV CUR MEDS BY ELIG CLIN: HCPCS | Performed by: INTERNAL MEDICINE

## 2025-08-12 ASSESSMENT — PATIENT HEALTH QUESTIONNAIRE - PHQ9
SUM OF ALL RESPONSES TO PHQ QUESTIONS 1-9: 0
SUM OF ALL RESPONSES TO PHQ QUESTIONS 1-9: 0
2. FEELING DOWN, DEPRESSED OR HOPELESS: NOT AT ALL
SUM OF ALL RESPONSES TO PHQ QUESTIONS 1-9: 0
1. LITTLE INTEREST OR PLEASURE IN DOING THINGS: NOT AT ALL
SUM OF ALL RESPONSES TO PHQ QUESTIONS 1-9: 0

## 2025-08-13 LAB
ALBUMIN SERPL-MCNC: 4.2 G/DL (ref 3.5–5.2)
ALBUMIN/GLOB SERPL: 1.3 (ref 1.1–2.2)
ALP SERPL-CCNC: 95 U/L (ref 35–104)
ALT SERPL-CCNC: 17 U/L (ref 10–35)
ANION GAP SERPL CALC-SCNC: 17 MMOL/L (ref 2–14)
AST SERPL-CCNC: 27 U/L (ref 10–35)
BILIRUB SERPL-MCNC: 0.4 MG/DL (ref 0–1.2)
BUN SERPL-MCNC: 24 MG/DL (ref 8–23)
CALCIUM SERPL-MCNC: 10.6 MG/DL (ref 8.8–10.2)
CALCIUM SERPL-MCNC: 10.6 MG/DL (ref 8.8–10.2)
CHLORIDE SERPL-SCNC: 97 MMOL/L (ref 98–107)
CO2 SERPL-SCNC: 25 MMOL/L (ref 20–29)
CREAT SERPL-MCNC: 0.92 MG/DL (ref 0.6–1)
CREAT UR-MCNC: 159 MG/DL (ref 28–217)
EST. AVERAGE GLUCOSE BLD GHB EST-MCNC: 122 MG/DL
GLOBULIN SER CALC-MCNC: 3.2 G/DL (ref 2–4)
GLUCOSE SERPL-MCNC: 98 MG/DL (ref 65–100)
HBA1C MFR BLD: 5.9 % (ref 4–5.6)
MICROALBUMIN UR-MCNC: <1.2 MG/DL
MICROALBUMIN/CREAT UR-RTO: NORMAL MG/G
POTASSIUM SERPL-SCNC: 3.9 MMOL/L (ref 3.5–5.1)
PROT SERPL-MCNC: 7.4 G/DL (ref 6.4–8.3)
PTH-INTACT SERPL-MCNC: 16.2 PG/ML (ref 15–65)
SODIUM SERPL-SCNC: 139 MMOL/L (ref 136–145)
URATE SERPL-MCNC: 6.5 MG/DL (ref 2.4–6.4)

## 2025-09-01 RX ORDER — CHLORTHALIDONE 25 MG/1
25 TABLET ORAL DAILY
Qty: 90 TABLET | Refills: 0 | Status: SHIPPED | OUTPATIENT
Start: 2025-09-01